# Patient Record
Sex: FEMALE | Race: WHITE | Employment: OTHER | URBAN - NONMETROPOLITAN AREA
[De-identification: names, ages, dates, MRNs, and addresses within clinical notes are randomized per-mention and may not be internally consistent; named-entity substitution may affect disease eponyms.]

---

## 2017-12-22 ENCOUNTER — HOSPITAL ENCOUNTER (OUTPATIENT)
Dept: MAMMOGRAPHY | Age: 67
Discharge: HOME | End: 2017-12-22
Payer: MEDICARE

## 2017-12-22 DIAGNOSIS — Z12.9 SCREENING FOR CANCER: ICD-10-CM

## 2017-12-22 PROCEDURE — G0202 SCR MAMMO BI INCL CAD: HCPCS

## 2018-12-21 ENCOUNTER — HOSPITAL ENCOUNTER (OUTPATIENT)
Dept: CT IMAGING | Age: 68
Discharge: HOME | End: 2018-12-21
Payer: MEDICARE

## 2018-12-21 DIAGNOSIS — Z13.6 ENCOUNTER FOR SCREENING FOR CARDIOVASCULAR DISORDERS: ICD-10-CM

## 2018-12-21 DIAGNOSIS — I10 ESSENTIAL HYPERTENSION: ICD-10-CM

## 2018-12-21 PROCEDURE — 75571 CT HRT W/O DYE W/CA TEST: CPT

## 2018-12-28 ENCOUNTER — HOSPITAL ENCOUNTER (OUTPATIENT)
Dept: MAMMOGRAPHY | Age: 68
Discharge: HOME | End: 2018-12-28
Payer: MEDICARE

## 2018-12-28 DIAGNOSIS — Z12.39 SCREENING BREAST EXAMINATION: ICD-10-CM

## 2018-12-28 PROCEDURE — 77067 SCR MAMMO BI INCL CAD: CPT

## 2020-01-03 ENCOUNTER — HOSPITAL ENCOUNTER (OUTPATIENT)
Dept: MAMMOGRAPHY | Age: 70
Discharge: HOME | End: 2020-01-03
Payer: MEDICARE

## 2020-01-03 PROCEDURE — 77063 BREAST TOMOSYNTHESIS BI: CPT

## 2020-08-20 ENCOUNTER — HOSPITAL ENCOUNTER (INPATIENT)
Age: 70
LOS: 4 days | Discharge: HOME | DRG: 854 | End: 2020-08-24
Attending: INTERNAL MEDICINE | Admitting: INTERNAL MEDICINE
Payer: MEDICARE

## 2020-08-20 PROBLEM — N17.9 ARF (ACUTE RENAL FAILURE) (HCC): Status: ACTIVE | Noted: 2020-08-20

## 2020-08-20 PROCEDURE — 1200000003 HC TELEMETRY R&B

## 2020-08-20 RX ORDER — SODIUM CHLORIDE 0.9 % (FLUSH) 0.9 %
10 SYRINGE (ML) INJECTION PRN
Status: DISCONTINUED | OUTPATIENT
Start: 2020-08-20 | End: 2020-08-24 | Stop reason: HOSPADM

## 2020-08-20 RX ORDER — CITALOPRAM 10 MG/1
10 TABLET ORAL NIGHTLY
COMMUNITY
End: 2021-10-28

## 2020-08-20 RX ORDER — LISINOPRIL 10 MG/1
10 TABLET ORAL DAILY
COMMUNITY
End: 2021-05-12

## 2020-08-20 RX ORDER — SODIUM CHLORIDE 9 MG/ML
INJECTION, SOLUTION INTRAVENOUS CONTINUOUS
Status: DISCONTINUED | OUTPATIENT
Start: 2020-08-21 | End: 2020-08-21

## 2020-08-20 RX ORDER — TAMSULOSIN HYDROCHLORIDE 0.4 MG/1
0.4 CAPSULE ORAL DAILY
Status: DISCONTINUED | OUTPATIENT
Start: 2020-08-21 | End: 2020-08-24 | Stop reason: HOSPADM

## 2020-08-20 RX ORDER — PROMETHAZINE HYDROCHLORIDE 25 MG/1
12.5 TABLET ORAL EVERY 6 HOURS PRN
Status: DISCONTINUED | OUTPATIENT
Start: 2020-08-20 | End: 2020-08-24 | Stop reason: HOSPADM

## 2020-08-20 RX ORDER — ACETAMINOPHEN 325 MG/1
650 TABLET ORAL EVERY 4 HOURS PRN
Status: DISCONTINUED | OUTPATIENT
Start: 2020-08-20 | End: 2020-08-24 | Stop reason: HOSPADM

## 2020-08-20 RX ORDER — SODIUM CHLORIDE 0.9 % (FLUSH) 0.9 %
10 SYRINGE (ML) INJECTION EVERY 12 HOURS SCHEDULED
Status: DISCONTINUED | OUTPATIENT
Start: 2020-08-21 | End: 2020-08-24 | Stop reason: HOSPADM

## 2020-08-20 RX ORDER — OXYCODONE HYDROCHLORIDE AND ACETAMINOPHEN 5; 325 MG/1; MG/1
1 TABLET ORAL EVERY 6 HOURS PRN
Status: DISCONTINUED | OUTPATIENT
Start: 2020-08-20 | End: 2020-08-24 | Stop reason: HOSPADM

## 2020-08-20 RX ORDER — POLYETHYLENE GLYCOL 3350 17 G/17G
17 POWDER, FOR SOLUTION ORAL DAILY PRN
Status: DISCONTINUED | OUTPATIENT
Start: 2020-08-20 | End: 2020-08-24 | Stop reason: HOSPADM

## 2020-08-20 RX ORDER — ONDANSETRON 2 MG/ML
4 INJECTION INTRAMUSCULAR; INTRAVENOUS EVERY 6 HOURS PRN
Status: DISCONTINUED | OUTPATIENT
Start: 2020-08-20 | End: 2020-08-24 | Stop reason: HOSPADM

## 2020-08-20 ASSESSMENT — PAIN - FUNCTIONAL ASSESSMENT: PAIN_FUNCTIONAL_ASSESSMENT: ACTIVITIES ARE NOT PREVENTED

## 2020-08-20 ASSESSMENT — PAIN DESCRIPTION - DESCRIPTORS: DESCRIPTORS: ACHING

## 2020-08-20 ASSESSMENT — PAIN DESCRIPTION - PAIN TYPE: TYPE: ACUTE PAIN

## 2020-08-20 ASSESSMENT — PAIN SCALES - GENERAL: PAINLEVEL_OUTOF10: 7

## 2020-08-20 ASSESSMENT — PAIN DESCRIPTION - LOCATION: LOCATION: FLANK

## 2020-08-20 ASSESSMENT — PAIN DESCRIPTION - FREQUENCY: FREQUENCY: CONTINUOUS

## 2020-08-20 ASSESSMENT — PAIN DESCRIPTION - ONSET: ONSET: ON-GOING

## 2020-08-20 ASSESSMENT — PAIN DESCRIPTION - ORIENTATION: ORIENTATION: RIGHT

## 2020-08-20 NOTE — PROGRESS NOTES
Transfer from [address] Marly Morgan renal failure kidney stone right flank pain Hx stones 1.2 cm ob stone CR 4.5  other labs fine urine small amt leukocytes 3=bacteria Rocephin 2 L fluids Hx /60 has been 150's 82 16 98% 96.7   IP tele  Dr Ldiia Monaco admitting

## 2020-08-21 ENCOUNTER — ANESTHESIA EVENT (OUTPATIENT)
Dept: OPERATING ROOM | Age: 70
DRG: 854 | End: 2020-08-21
Payer: MEDICARE

## 2020-08-21 ENCOUNTER — ANESTHESIA (OUTPATIENT)
Dept: OPERATING ROOM | Age: 70
DRG: 854 | End: 2020-08-21
Payer: MEDICARE

## 2020-08-21 VITALS
SYSTOLIC BLOOD PRESSURE: 115 MMHG | OXYGEN SATURATION: 100 % | RESPIRATION RATE: 13 BRPM | DIASTOLIC BLOOD PRESSURE: 76 MMHG

## 2020-08-21 LAB
ALBUMIN SERPL-MCNC: 2.1 G/DL (ref 3.5–5.1)
ALP BLD-CCNC: 98 U/L (ref 38–126)
ALT SERPL-CCNC: 60 U/L (ref 11–66)
ANION GAP SERPL CALCULATED.3IONS-SCNC: 12 MEQ/L (ref 8–16)
APTT: 22.8 SECONDS (ref 22–38)
AST SERPL-CCNC: 35 U/L (ref 5–40)
BACTERIA: ABNORMAL /HPF
BILIRUB SERPL-MCNC: 0.2 MG/DL (ref 0.3–1.2)
BILIRUBIN URINE: NEGATIVE
BLOOD, URINE: ABNORMAL
BUN BLDV-MCNC: 105 MG/DL (ref 7–22)
CALCIUM SERPL-MCNC: 9.3 MG/DL (ref 8.5–10.5)
CASTS 2: ABNORMAL /LPF
CASTS UA: ABNORMAL /LPF
CHARACTER, URINE: ABNORMAL
CHLORIDE BLD-SCNC: 104 MEQ/L (ref 98–111)
CO2: 17 MEQ/L (ref 23–33)
COLOR: YELLOW
CREAT SERPL-MCNC: 3.7 MG/DL (ref 0.4–1.2)
CRYSTALS, UA: ABNORMAL
EKG ATRIAL RATE: 96 BPM
EKG P AXIS: 49 DEGREES
EKG P-R INTERVAL: 158 MS
EKG Q-T INTERVAL: 394 MS
EKG QRS DURATION: 78 MS
EKG QTC CALCULATION (BAZETT): 497 MS
EKG R AXIS: 51 DEGREES
EKG T AXIS: 71 DEGREES
EKG VENTRICULAR RATE: 96 BPM
EPITHELIAL CELLS, UA: ABNORMAL /HPF
ERYTHROCYTE [DISTWIDTH] IN BLOOD BY AUTOMATED COUNT: 14.4 % (ref 11.5–14.5)
ERYTHROCYTE [DISTWIDTH] IN BLOOD BY AUTOMATED COUNT: 46.9 FL (ref 35–45)
GFR SERPL CREATININE-BSD FRML MDRD: 12 ML/MIN/1.73M2
GLUCOSE BLD-MCNC: 159 MG/DL (ref 70–108)
GLUCOSE URINE: NEGATIVE MG/DL
HCT VFR BLD CALC: 33.2 % (ref 37–47)
HEMOGLOBIN: 11 GM/DL (ref 12–16)
INR BLD: 1.1 (ref 0.85–1.13)
KETONES, URINE: NEGATIVE
LACTIC ACID: 0.8 MMOL/L (ref 0.5–2.2)
LEUKOCYTE ESTERASE, URINE: ABNORMAL
MCH RBC QN AUTO: 29.7 PG (ref 26–33)
MCHC RBC AUTO-ENTMCNC: 33.1 GM/DL (ref 32.2–35.5)
MCV RBC AUTO: 89.7 FL (ref 81–99)
MISCELLANEOUS 2: ABNORMAL
NITRITE, URINE: NEGATIVE
PH UA: 5 (ref 5–9)
PLATELET # BLD: 131 THOU/MM3 (ref 130–400)
PMV BLD AUTO: 11.1 FL (ref 9.4–12.4)
POTASSIUM SERPL-SCNC: 3.8 MEQ/L (ref 3.5–5.2)
PROCALCITONIN: 7.93 NG/ML (ref 0.01–0.09)
PROTEIN UA: 30
RBC # BLD: 3.7 MILL/MM3 (ref 4.2–5.4)
RBC URINE: ABNORMAL /HPF
RENAL EPITHELIAL, UA: ABNORMAL
SARS-COV-2, PCR: NOT DETECTED
SODIUM BLD-SCNC: 133 MEQ/L (ref 135–145)
SPECIFIC GRAVITY, URINE: 1.01 (ref 1–1.03)
TOTAL PROTEIN: 5.5 G/DL (ref 6.1–8)
UROBILINOGEN, URINE: 0.2 EU/DL (ref 0–1)
WBC # BLD: 11.7 THOU/MM3 (ref 4.8–10.8)
WBC UA: > 100 /HPF
YEAST: ABNORMAL

## 2020-08-21 PROCEDURE — 36415 COLL VENOUS BLD VENIPUNCTURE: CPT

## 2020-08-21 PROCEDURE — 2580000003 HC RX 258: Performed by: INTERNAL MEDICINE

## 2020-08-21 PROCEDURE — 6370000000 HC RX 637 (ALT 250 FOR IP): Performed by: STUDENT IN AN ORGANIZED HEALTH CARE EDUCATION/TRAINING PROGRAM

## 2020-08-21 PROCEDURE — 52332 CYSTOSCOPY AND TREATMENT: CPT | Performed by: UROLOGY

## 2020-08-21 PROCEDURE — 3600000003 HC SURGERY LEVEL 3 BASE: Performed by: UROLOGY

## 2020-08-21 PROCEDURE — 3700000000 HC ANESTHESIA ATTENDED CARE: Performed by: UROLOGY

## 2020-08-21 PROCEDURE — 7100000000 HC PACU RECOVERY - FIRST 15 MIN: Performed by: UROLOGY

## 2020-08-21 PROCEDURE — 93005 ELECTROCARDIOGRAM TRACING: CPT | Performed by: ANESTHESIOLOGY

## 2020-08-21 PROCEDURE — 7100000001 HC PACU RECOVERY - ADDTL 15 MIN: Performed by: UROLOGY

## 2020-08-21 PROCEDURE — 87077 CULTURE AEROBIC IDENTIFY: CPT

## 2020-08-21 PROCEDURE — 6360000002 HC RX W HCPCS: Performed by: STUDENT IN AN ORGANIZED HEALTH CARE EDUCATION/TRAINING PROGRAM

## 2020-08-21 PROCEDURE — 3700000001 HC ADD 15 MINUTES (ANESTHESIA): Performed by: UROLOGY

## 2020-08-21 PROCEDURE — 99221 1ST HOSP IP/OBS SF/LOW 40: CPT | Performed by: NURSE PRACTITIONER

## 2020-08-21 PROCEDURE — 6370000000 HC RX 637 (ALT 250 FOR IP): Performed by: NURSE PRACTITIONER

## 2020-08-21 PROCEDURE — 3600000013 HC SURGERY LEVEL 3 ADDTL 15MIN: Performed by: UROLOGY

## 2020-08-21 PROCEDURE — 2580000003 HC RX 258: Performed by: UROLOGY

## 2020-08-21 PROCEDURE — 6360000002 HC RX W HCPCS: Performed by: NURSE ANESTHETIST, CERTIFIED REGISTERED

## 2020-08-21 PROCEDURE — 87040 BLOOD CULTURE FOR BACTERIA: CPT

## 2020-08-21 PROCEDURE — 2580000003 HC RX 258: Performed by: ANESTHESIOLOGY

## 2020-08-21 PROCEDURE — 2709999900 HC NON-CHARGEABLE SUPPLY: Performed by: UROLOGY

## 2020-08-21 PROCEDURE — U0002 COVID-19 LAB TEST NON-CDC: HCPCS

## 2020-08-21 PROCEDURE — 0T768DZ DILATION OF RIGHT URETER WITH INTRALUMINAL DEVICE, VIA NATURAL OR ARTIFICIAL OPENING ENDOSCOPIC: ICD-10-PCS | Performed by: UROLOGY

## 2020-08-21 PROCEDURE — C1769 GUIDE WIRE: HCPCS | Performed by: UROLOGY

## 2020-08-21 PROCEDURE — 99223 1ST HOSP IP/OBS HIGH 75: CPT | Performed by: FAMILY MEDICINE

## 2020-08-21 PROCEDURE — 81001 URINALYSIS AUTO W/SCOPE: CPT

## 2020-08-21 PROCEDURE — 87186 SC STD MICRODIL/AGAR DIL: CPT

## 2020-08-21 PROCEDURE — 2580000003 HC RX 258: Performed by: NURSE PRACTITIONER

## 2020-08-21 PROCEDURE — 87086 URINE CULTURE/COLONY COUNT: CPT

## 2020-08-21 PROCEDURE — 85730 THROMBOPLASTIN TIME PARTIAL: CPT

## 2020-08-21 PROCEDURE — 85027 COMPLETE CBC AUTOMATED: CPT

## 2020-08-21 PROCEDURE — C2617 STENT, NON-COR, TEM W/O DEL: HCPCS | Performed by: UROLOGY

## 2020-08-21 PROCEDURE — 2140000000 HC CCU INTERMEDIATE R&B

## 2020-08-21 PROCEDURE — 2580000003 HC RX 258: Performed by: STUDENT IN AN ORGANIZED HEALTH CARE EDUCATION/TRAINING PROGRAM

## 2020-08-21 PROCEDURE — 6360000002 HC RX W HCPCS: Performed by: UROLOGY

## 2020-08-21 PROCEDURE — 80053 COMPREHEN METABOLIC PANEL: CPT

## 2020-08-21 PROCEDURE — 6370000000 HC RX 637 (ALT 250 FOR IP): Performed by: UROLOGY

## 2020-08-21 PROCEDURE — 83605 ASSAY OF LACTIC ACID: CPT

## 2020-08-21 PROCEDURE — 2500000003 HC RX 250 WO HCPCS: Performed by: NURSE ANESTHETIST, CERTIFIED REGISTERED

## 2020-08-21 PROCEDURE — 84145 PROCALCITONIN (PCT): CPT

## 2020-08-21 PROCEDURE — 85610 PROTHROMBIN TIME: CPT

## 2020-08-21 DEVICE — VARIABLE LENGTH URETERAL STENT
Type: IMPLANTABLE DEVICE | Site: URETER | Status: FUNCTIONAL
Brand: CONTOUR VL™

## 2020-08-21 RX ORDER — MIDAZOLAM HYDROCHLORIDE 1 MG/ML
INJECTION INTRAMUSCULAR; INTRAVENOUS PRN
Status: DISCONTINUED | OUTPATIENT
Start: 2020-08-21 | End: 2020-08-21 | Stop reason: SDUPTHER

## 2020-08-21 RX ORDER — FENTANYL CITRATE 50 UG/ML
50 INJECTION, SOLUTION INTRAMUSCULAR; INTRAVENOUS EVERY 5 MIN PRN
Status: DISCONTINUED | OUTPATIENT
Start: 2020-08-21 | End: 2020-08-21 | Stop reason: HOSPADM

## 2020-08-21 RX ORDER — M-VIT,TX,IRON,MINS/CALC/FOLIC 27MG-0.4MG
1 TABLET ORAL DAILY
Status: DISCONTINUED | OUTPATIENT
Start: 2020-08-21 | End: 2020-08-24 | Stop reason: HOSPADM

## 2020-08-21 RX ORDER — CITALOPRAM 20 MG/1
10 TABLET ORAL NIGHTLY
Status: DISCONTINUED | OUTPATIENT
Start: 2020-08-21 | End: 2020-08-24 | Stop reason: HOSPADM

## 2020-08-21 RX ORDER — SODIUM CHLORIDE, SODIUM LACTATE, POTASSIUM CHLORIDE, AND CALCIUM CHLORIDE .6; .31; .03; .02 G/100ML; G/100ML; G/100ML; G/100ML
1000 INJECTION, SOLUTION INTRAVENOUS ONCE
Status: COMPLETED | OUTPATIENT
Start: 2020-08-21 | End: 2020-08-21

## 2020-08-21 RX ORDER — LABETALOL 20 MG/4 ML (5 MG/ML) INTRAVENOUS SYRINGE
10 EVERY 10 MIN PRN
Status: DISCONTINUED | OUTPATIENT
Start: 2020-08-21 | End: 2020-08-21 | Stop reason: HOSPADM

## 2020-08-21 RX ORDER — CHROMIUM 200 MCG
1 TABLET ORAL DAILY
Status: DISCONTINUED | OUTPATIENT
Start: 2020-08-21 | End: 2020-08-21 | Stop reason: RX

## 2020-08-21 RX ORDER — 0.9 % SODIUM CHLORIDE 0.9 %
500 INTRAVENOUS SOLUTION INTRAVENOUS ONCE
Status: COMPLETED | OUTPATIENT
Start: 2020-08-21 | End: 2020-08-21

## 2020-08-21 RX ORDER — ONDANSETRON 2 MG/ML
INJECTION INTRAMUSCULAR; INTRAVENOUS PRN
Status: DISCONTINUED | OUTPATIENT
Start: 2020-08-21 | End: 2020-08-21 | Stop reason: SDUPTHER

## 2020-08-21 RX ORDER — LACTOBACILLUS RHAMNOSUS GG 10B CELL
1 CAPSULE ORAL DAILY
Status: DISCONTINUED | OUTPATIENT
Start: 2020-08-21 | End: 2020-08-24 | Stop reason: HOSPADM

## 2020-08-21 RX ORDER — EPHEDRINE SULFATE/0.9% NACL/PF 50 MG/5 ML
SYRINGE (ML) INTRAVENOUS PRN
Status: DISCONTINUED | OUTPATIENT
Start: 2020-08-21 | End: 2020-08-21 | Stop reason: SDUPTHER

## 2020-08-21 RX ORDER — DEXAMETHASONE SODIUM PHOSPHATE 4 MG/ML
INJECTION, SOLUTION INTRA-ARTICULAR; INTRALESIONAL; INTRAMUSCULAR; INTRAVENOUS; SOFT TISSUE PRN
Status: DISCONTINUED | OUTPATIENT
Start: 2020-08-21 | End: 2020-08-21 | Stop reason: SDUPTHER

## 2020-08-21 RX ORDER — DOCUSATE SODIUM 100 MG/1
100 CAPSULE, LIQUID FILLED ORAL DAILY PRN
Status: DISCONTINUED | OUTPATIENT
Start: 2020-08-21 | End: 2020-08-24 | Stop reason: HOSPADM

## 2020-08-21 RX ORDER — FENTANYL CITRATE 50 UG/ML
25 INJECTION, SOLUTION INTRAMUSCULAR; INTRAVENOUS EVERY 5 MIN PRN
Status: DISCONTINUED | OUTPATIENT
Start: 2020-08-21 | End: 2020-08-21 | Stop reason: HOSPADM

## 2020-08-21 RX ORDER — ACETAMINOPHEN 325 MG/1
325 TABLET ORAL ONCE
Status: COMPLETED | OUTPATIENT
Start: 2020-08-21 | End: 2020-08-21

## 2020-08-21 RX ORDER — PROMETHAZINE HYDROCHLORIDE 25 MG/ML
12.5 INJECTION, SOLUTION INTRAMUSCULAR; INTRAVENOUS
Status: DISCONTINUED | OUTPATIENT
Start: 2020-08-21 | End: 2020-08-21 | Stop reason: HOSPADM

## 2020-08-21 RX ORDER — SUCCINYLCHOLINE CHLORIDE 20 MG/ML
INJECTION INTRAMUSCULAR; INTRAVENOUS PRN
Status: DISCONTINUED | OUTPATIENT
Start: 2020-08-21 | End: 2020-08-21 | Stop reason: SDUPTHER

## 2020-08-21 RX ORDER — PROPOFOL 10 MG/ML
INJECTION, EMULSION INTRAVENOUS PRN
Status: DISCONTINUED | OUTPATIENT
Start: 2020-08-21 | End: 2020-08-21 | Stop reason: SDUPTHER

## 2020-08-21 RX ORDER — FENTANYL CITRATE 50 UG/ML
INJECTION, SOLUTION INTRAMUSCULAR; INTRAVENOUS PRN
Status: DISCONTINUED | OUTPATIENT
Start: 2020-08-21 | End: 2020-08-21 | Stop reason: SDUPTHER

## 2020-08-21 RX ORDER — LIDOCAINE HCL/PF 100 MG/5ML
SYRINGE (ML) INJECTION PRN
Status: DISCONTINUED | OUTPATIENT
Start: 2020-08-21 | End: 2020-08-21 | Stop reason: SDUPTHER

## 2020-08-21 RX ORDER — FENOFIBRATE 160 MG/1
160 TABLET ORAL DAILY
Status: DISCONTINUED | OUTPATIENT
Start: 2020-08-21 | End: 2020-08-24 | Stop reason: HOSPADM

## 2020-08-21 RX ORDER — SODIUM CHLORIDE 9 MG/ML
INJECTION, SOLUTION INTRAVENOUS CONTINUOUS
Status: DISCONTINUED | OUTPATIENT
Start: 2020-08-21 | End: 2020-08-24

## 2020-08-21 RX ORDER — LANOLIN ALCOHOL/MO/W.PET/CERES
4.5 CREAM (GRAM) TOPICAL NIGHTLY
Status: DISCONTINUED | OUTPATIENT
Start: 2020-08-21 | End: 2020-08-24 | Stop reason: HOSPADM

## 2020-08-21 RX ADMIN — Medication 4.5 MG: at 21:31

## 2020-08-21 RX ADMIN — ONDANSETRON 4 MG: 2 INJECTION INTRAMUSCULAR; INTRAVENOUS at 09:20

## 2020-08-21 RX ADMIN — Medication 10 MG: at 13:44

## 2020-08-21 RX ADMIN — SODIUM CHLORIDE 500 ML: 9 INJECTION, SOLUTION INTRAVENOUS at 15:00

## 2020-08-21 RX ADMIN — PIPERACILLIN AND TAZOBACTAM 3.38 G: 3; .375 INJECTION, POWDER, LYOPHILIZED, FOR SOLUTION INTRAVENOUS at 20:30

## 2020-08-21 RX ADMIN — PROPOFOL 100 MG: 10 INJECTION, EMULSION INTRAVENOUS at 13:32

## 2020-08-21 RX ADMIN — PHENYLEPHRINE HYDROCHLORIDE 100 MCG: 10 INJECTION INTRAVENOUS at 13:38

## 2020-08-21 RX ADMIN — SODIUM CHLORIDE 500 ML: 9 INJECTION, SOLUTION INTRAVENOUS at 09:51

## 2020-08-21 RX ADMIN — SODIUM CHLORIDE 500 ML: 9 INJECTION, SOLUTION INTRAVENOUS at 15:30

## 2020-08-21 RX ADMIN — ACETAMINOPHEN 650 MG: 325 TABLET ORAL at 00:30

## 2020-08-21 RX ADMIN — DEXAMETHASONE SODIUM PHOSPHATE 8 MG: 4 INJECTION, SOLUTION INTRAMUSCULAR; INTRAVENOUS at 13:40

## 2020-08-21 RX ADMIN — PHENYLEPHRINE HYDROCHLORIDE 200 MCG: 10 INJECTION INTRAVENOUS at 13:44

## 2020-08-21 RX ADMIN — Medication 100 MG: at 13:32

## 2020-08-21 RX ADMIN — SODIUM CHLORIDE: 9 INJECTION, SOLUTION INTRAVENOUS at 00:26

## 2020-08-21 RX ADMIN — Medication 10 ML: at 20:30

## 2020-08-21 RX ADMIN — FENTANYL CITRATE 50 MCG: 50 INJECTION, SOLUTION INTRAMUSCULAR; INTRAVENOUS at 13:26

## 2020-08-21 RX ADMIN — PHENYLEPHRINE HYDROCHLORIDE 200 MCG: 10 INJECTION INTRAVENOUS at 13:42

## 2020-08-21 RX ADMIN — PIPERACILLIN AND TAZOBACTAM 3.38 G: 3; .375 INJECTION, POWDER, LYOPHILIZED, FOR SOLUTION INTRAVENOUS at 09:50

## 2020-08-21 RX ADMIN — ACETAMINOPHEN 325 MG: 325 TABLET ORAL at 09:55

## 2020-08-21 RX ADMIN — ACETAMINOPHEN 650 MG: 325 TABLET ORAL at 07:58

## 2020-08-21 RX ADMIN — PHENYLEPHRINE HYDROCHLORIDE 100 MCG: 10 INJECTION INTRAVENOUS at 13:32

## 2020-08-21 RX ADMIN — CITALOPRAM 10 MG: 20 TABLET, FILM COATED ORAL at 21:31

## 2020-08-21 RX ADMIN — SODIUM CHLORIDE, POTASSIUM CHLORIDE, SODIUM LACTATE AND CALCIUM CHLORIDE 1000 ML: 600; 310; 30; 20 INJECTION, SOLUTION INTRAVENOUS at 16:00

## 2020-08-21 RX ADMIN — SUCCINYLCHOLINE CHLORIDE 100 MG: 20 INJECTION, SOLUTION INTRAMUSCULAR; INTRAVENOUS at 13:32

## 2020-08-21 RX ADMIN — ONDANSETRON HYDROCHLORIDE 4 MG: 4 INJECTION, SOLUTION INTRAMUSCULAR; INTRAVENOUS at 13:40

## 2020-08-21 RX ADMIN — MIDAZOLAM HYDROCHLORIDE 1 MG: 1 INJECTION, SOLUTION INTRAMUSCULAR; INTRAVENOUS at 13:24

## 2020-08-21 ASSESSMENT — PULMONARY FUNCTION TESTS
PIF_VALUE: 1
PIF_VALUE: 2
PIF_VALUE: 16
PIF_VALUE: 0
PIF_VALUE: 1
PIF_VALUE: 0
PIF_VALUE: 0
PIF_VALUE: 18
PIF_VALUE: 15
PIF_VALUE: 0
PIF_VALUE: 15
PIF_VALUE: 18
PIF_VALUE: 16
PIF_VALUE: 18
PIF_VALUE: 16
PIF_VALUE: 3
PIF_VALUE: 3
PIF_VALUE: 17
PIF_VALUE: 18
PIF_VALUE: 15
PIF_VALUE: 15
PIF_VALUE: 0
PIF_VALUE: 18
PIF_VALUE: 17
PIF_VALUE: 16
PIF_VALUE: 6

## 2020-08-21 ASSESSMENT — PAIN SCALES - GENERAL
PAINLEVEL_OUTOF10: 0
PAINLEVEL_OUTOF10: 0
PAINLEVEL_OUTOF10: 7
PAINLEVEL_OUTOF10: 0
PAINLEVEL_OUTOF10: 0
PAINLEVEL_OUTOF10: 4
PAINLEVEL_OUTOF10: 4
PAINLEVEL_OUTOF10: 0
PAINLEVEL_OUTOF10: 6
PAINLEVEL_OUTOF10: 4

## 2020-08-21 ASSESSMENT — PAIN DESCRIPTION - PAIN TYPE
TYPE: ACUTE PAIN
TYPE: ACUTE PAIN

## 2020-08-21 ASSESSMENT — PAIN DESCRIPTION - LOCATION
LOCATION: FLANK
LOCATION: FLANK

## 2020-08-21 ASSESSMENT — PAIN DESCRIPTION - ORIENTATION: ORIENTATION: RIGHT

## 2020-08-21 NOTE — ANESTHESIA PRE PROCEDURE
Department of Anesthesiology  Preprocedure Note       Name:  Alissa Weinberg   Age:  68 y.o.  :  1950                                          MRN:  [id number]         Date:  2020      Surgeon: Sameera Ku):  Lisa Cheng MD    Procedure: Procedure(s):  CYSTOSCOPY, RIGHT  URETERAL STENT INSERTION    Medications prior to admission:   Prior to Admission medications    Medication Sig Start Date End Date Taking? Authorizing Provider   citalopram (CELEXA) 10 MG tablet Take 10 mg by mouth nightly   Yes Historical Provider, MD   lisinopril (PRINIVIL;ZESTRIL) 10 MG tablet Take 10 mg by mouth daily   Yes Historical Provider, MD   melatonin 5 MG TABS tablet Take 5 mg by mouth nightly   Yes Historical Provider, MD   docusate sodium (COLACE) 100 MG capsule Take 1 capsule by mouth daily as needed for Constipation 16   Reinaldo Peralta MD   Phosphatidylserine 100 MG CAPS Take 1.5 capsules by mouth daily    Historical Provider, MD   Chromium 200 MCG TABS Take 1 tablet by mouth daily    Historical Provider, MD   UNABLE TO FIND Take 2 tablets by mouth nightly L-Tryptophan    Historical Provider, MD   UNABLE TO FIND daily ligaplex    Historical Provider, MD   UNABLE TO FIND 1 tablet 2 times daily Alpha lippin    Historical Provider, MD   UNABLE TO FIND 1 tablet daily paratid    Historical Provider, MD   Nutritional Supplements (BOOST PO) Take by mouth 2 times daily    Historical ProviderMD [facility] [address] daily Amino sculpt    Historical Provider, MD   UNABLE TO FIND daily cholacol  II    Historical Provider, MD   UNABLE TO FIND 2 tablets daily ANTRONEX    Historical Provider, MD   UNABLE TO FIND 1 tablet daily [address]    Historical Provider, MD   Alosetron HCl (LOTRONEX PO) Take by mouth as needed    Historical Provider, MD   fenofibrate (TRICOR) 145 MG tablet Take 145 mg by mouth daily. Historical Provider, MD   Potassium 99 MG TABS Take  by mouth daily.     Historical Provider, MD   Probiotic Product injection 40 mg  40 mg Subcutaneous Daily Chastity Guerrero DO        oxyCODONE-acetaminophen (PERCOCET) 5-325 MG per tablet 1 tablet  1 tablet Oral Q6H PRN Chastity Guerrero DO        tamsulosin (FLOMAX) capsule 0.4 mg  0.4 mg Oral Daily Chastity Guerrero DO           Allergies: Allergies   Allergen Reactions    Asa [Aspirin] Other (See Comments)     shaky       Problem List:    Patient Active Problem List   Diagnosis Code    Kidney stone N20.0    ARF (acute renal failure) ([facility] N17.9       Past Medical History:        Diagnosis Date    Anxiety     Breast cancer ([facility]     Fibromyalgia     Hyperlipidemia     Hypertension     IBS (irritable bowel syndrome)     Kidney stones     Nausea & vomiting     Prolonged emergence from general anesthesia        Past Surgical History:        Procedure Laterality Date    [facility]  2007    CYSTOSCOPY  16    Right Ureteroscopy Laser Lithotripsy Basket Retrieval of Stone Fragments Right Ureteral Stent Placement, Left Ureteroscopy Basket Retrieval of Stone Left Ureteral Stent Placement - Dr. Parra [facility] CYSTOURETHROSCOPY Left 2013    HOLMIUM LASER LITHOTRIPSY, BASKET EXTRACTION  STENT INSERTION     DILATION AND CURETTAGE OF UTERUS      x3    HYSTERECTOMY      LITHOTRIPSY      OTHER SURGICAL HISTORY      samuel?  URETEROSCOPY         Social History:    Social History     Tobacco Use    Smoking status: Former Smoker     Packs/day: 0.50     Years: 6.00     Pack years: 3.00     Last attempt to quit: 1973     Years since quittin.1    Smokeless tobacco: Never Used   Substance Use Topics    Alcohol use: Yes     Comment: OCC.                                 Counseling given: Not Answered      Vital Signs (Current):   Vitals:    20 0310 20 0810 20 0913 20 1126   BP: (!) 145/70 (!) 189/83 (!) 151/63 (!) 97/57   Pulse: 67 64 121 106   Resp: 17 16 22 15 Temp: 97.9 °F (36.6 °C) 99.3 °F (37.4 °C) 102.1 °F (38.9 °C) 99.8 °F (37.7 °C)   TempSrc: Oral Oral Oral Oral   SpO2: 96% 96% 96% 93%   Weight:       Height:                                                  BP Readings from Last 3 Encounters:   08/21/20 (!) 97/57   04/27/16 160/70   04/07/15 110/68       NPO Status:                                                                                 BMI:   Wt Readings from Last 3 Encounters:   08/20/20 125 lb 6.4 oz (56.9 kg)   04/27/16 123 lb 7.3 oz (56 kg)   04/21/16 121 lb (54.9 kg)     Body mass index is 22.94 kg/m². CBC:   Lab Results   Component Value Date    WBC 11.7 08/21/2020    RBC 3.70 08/21/2020    RBC 3.90 04/15/2016    HGB 11.0 08/21/2020    HCT 33.2 08/21/2020    MCV 89.7 08/21/2020    RDW 13.9 04/16/2016     08/21/2020       CMP:   Lab Results   Component Value Date     08/21/2020    K 3.8 08/21/2020     08/21/2020    CO2 17 08/21/2020     08/21/2020    CREATININE 3.7 08/21/2020    CREATININE 1.0 11/20/2013    LABGLOM 12 08/21/2020    GLUCOSE 159 08/21/2020    GLUCOSE 89 04/15/2016    PROT 5.5 08/21/2020    CALCIUM 9.3 08/21/2020    BILITOT 0.2 08/21/2020    ALKPHOS 98 08/21/2020    AST 35 08/21/2020    ALT 60 08/21/2020       POC Tests: No results for input(s): POCGLU, POCNA, POCK, POCCL, POCBUN, POCHEMO, POCHCT in the last 72 hours.     Coags:   Lab Results   Component Value Date    INR 1.10 08/21/2020    APTT 22.8 08/21/2020       HCG (If Applicable): No results found for: PREGTESTUR, PREGSERUM, HCG, HCGQUANT     ABGs: No results found for: PHART, PO2ART, ZKH6WID, BQZ6GAU, BEART, N1KPAZVH     Type & Screen (If Applicable):  No results found for: LABABO, LABRH    Drug/Infectious Status (If Applicable):  No results found for: HIV, HEPCAB    COVID-19 Screening (If Applicable):   Lab Results   Component Value Date    COVID19 NOT DETECTED 08/21/2020         Anesthesia Evaluation  Patient summary reviewed  Airway: Mallampati: II  TM distance: >3 FB   Neck ROM: full  Mouth opening: > = 3 FB Dental:          Pulmonary:                              Cardiovascular:    (+) hypertension:,       ECG reviewed                        Neuro/Psych:   (+) neuromuscular disease:,             GI/Hepatic/Renal:             Endo/Other:                     Abdominal:           Vascular:                                        Anesthesia Plan      general     ASA 2       Induction: intravenous and rapid sequence. MIPS: Postoperative opioids intended and Prophylactic antiemetics administered. Anesthetic plan and risks discussed with patient and spouse. Plan discussed with CRNA. Lauryn Liz.  [address] [place] [address]   8/21/2020

## 2020-08-21 NOTE — PROGRESS NOTES
Patient has temp, heart rate up to 130's. Patient shivering. Agnieszka notified.  Fluid bolus given and 1  Tylenol given in addition to the Tylenol given this am.

## 2020-08-21 NOTE — BRIEF OP NOTE
Brief Postoperative Note      Patient: Swapnil Peters  YOB: 1950  MRN: [id number]    Date of Procedure: 8/21/2020    Pre-Op Diagnosis: RIGHT URETERAL CALCULUS    Post-Op Diagnosis: Same; pyohydronephrosis       Procedure(s):  CYSTOSCOPY, RIGHT  URETERAL STENT INSERTION    Surgeon(s):  Dianne Torrez MD    Assistant:  * No surgical staff found *    Anesthesia: General    Estimated Blood Loss (mL): Minimal    Complications: None    Specimens:   * No specimens in log *    Implants:  Implant Name Type Inv.  Item Serial No.  Lot No. LRB No. Used Action   STENT URET DBL PIGTL MULTI 6FR [id number] Stent:Urological Laveta Beat PIGTL MULTI 6FR [id number]  [employer]: [address] [place] [address] [id number] Right 1 Implanted         Drains: * No LDAs found *    Findings: See op report    Electronically signed by Cathy Orozco MD on 8/21/2020 at 1:53 PM

## 2020-08-21 NOTE — PROGRESS NOTES
Pharmacy Renal Adjustment    Jina Day is a 68 y.o. female. Pharmacy renally adjust the following medications per P&T approved policy: Zosyn    Recent Labs     08/21/20  0045   *       Recent Labs     08/21/20  0045   CREATININE 3.7*       Estimated Creatinine Clearance: 11 mL/min (A) (based on SCr of 3.7 mg/dL [facility])).   Calculated CrCl: 11 ml/min    Height:   Ht Readings from Last 1 Encounters:   08/20/20 5' 2\" (1.575 m)     Weight:  Wt Readings from Last 1 Encounters:   08/20/20 125 lb 6.4 oz (56.9 kg)         Plan: Adjustments based on renal function:           Change Zosyn 3.375 g q8h  to q12h

## 2020-08-21 NOTE — PROGRESS NOTES
Pt admitted to  6K12 from ED. Complaints: abdominal pain. IV normal saline infusing into the antecubital right, condition patent and no redness at a rate of 50 mls/ hour with about 200 mls in the bag still. IV site free of s/s of infection or infiltration. Vital signs obtained. Assessment and data collection initiated. Two nurse skin assessment performed by [facility] RN and Amina Hernandez. Oriented to room. Policies and procedures for 6K explained. Danika KIMBLE discussed hourly rounding with patient addressing 5 P's. Fall prevention and safety brochure discussed with patient. Bed alarm on. Call light in reach. The best day to schedule a follow up Dr appointment is:  Tuesday a.m. Explained patients right to have family, representative or physician notified of their admission. Patient has Declined for physician to be notified. Patient has Declined for family/representative to be notified. All questions answered with no further questions at this time.

## 2020-08-21 NOTE — PROGRESS NOTES
49 Josselyn Toro care of pt , denies any pain or nausea , feels the need to void pt placed on bedpan BP in the 36'K systolic and HR tachy , IV fluidscontinue wide open for a 500 ml fluid challenge  1430 able to void  1440 500 fluid challenge complete   5116 BP 80 systolic , Dr Ruben Page updated orders given pt remains pain free  1500 500 ml fluid challenge # 2 started  1505 update sent to family By Floor nurse Jordon Paeg updated orders given , 500 ml fluid challenge # 3 started , pt remains pain free  1545 Dr Nikia Beasley hospitalist  Called , given update on pt , orders given   1600 0.9 500 ml fluid challenge complete, 1000 ml LR fluid challenge started, update given to [facility] 6K floor nurse to give to pt  , pt resting resp easy   1630 BP coming up slightly  1640 pt c/o dull ache pain under Lt breast Dr Lisa aware orders given   [id number] EKG completed , viewed by Dr Lisa  1700 1000 ml LR fluid challenge complete ,IV back to floor rate [facility] NP given update on pt    1710 report called to 3b , update and room number sent to family via Ata Ganxstranatana[address] 197 floor nurse  [phone] resting on and off denies any needs  1735 pt denies any pain , able to rest on and off resp easy   1740 meets criteria for discharge , transported to  ,  to meet pt in room

## 2020-08-21 NOTE — PROGRESS NOTES
Arrived to pacu at 1355. 3L oxygen applied. Follows simple commands and denies any pain.     1407- no complaints of pain  1414- no complaints of pain, resting comfortably, HR coming down slighty

## 2020-08-21 NOTE — CARE COORDINATION
8/21/20, 7:45 AM EDT  DISCHARGE PLANNING EVALUATION:    Moshe Gonzalez       Admitted from: transfer from [facility] 8/20/2020/ 2143 Hospital day: 1   Location: [facility] Reason for admit: ARF (acute renal failure) ([facility] [address] [N17.9] Status: IP   Admit order signed?: no  PMH:  has a past medical history of Anxiety, [facility] Fibromyalgia, Hyperlipidemia, Hypertension, IBS (irritable bowel syndrome), Kidney stones, Nausea & vomiting, and Prolonged emergence from general anesthesia. Medications:  Scheduled Meds:   citalopram  10 mg Oral Nightly    fenofibrate  160 mg Oral Daily    melatonin  4.5 mg Oral Nightly    therapeutic multivitamin-minerals  1 tablet Oral Daily    lactobacillus  1 capsule Oral Daily    piperacillin-tazobactam  3.375 g Intravenous Q12H    sodium chloride flush  10 mL Intravenous 2 times per day    [Held by provider] enoxaparin  40 mg Subcutaneous Daily    tamsulosin  0.4 mg Oral Daily     Continuous Infusions:   sodium chloride 100 mL/hr at 08/21/20 0026      Pertinent Info/Orders/Treatment Plan:  CO2 17, creat 3.7, PCT 7.93, UA abn. IVF, IV zosyn, pain control. Urology consult for report of right ureteral stone, planning OR later today for stent placement if Covid neg. Diet: Diet NPO, After Midnight   Smoking status:  reports that she quit smoking about 47 years ago. She has a 3.00 pack-year smoking history.  She has never used smokeless tobacco.   PCP: Qamar Mcmahon MD.  Readmission 30 days or less: no  Readmission Risk Score: 11%    Discharge Planning Evaluation  Current Residence:  Private Residence  Living Arrangements:  Spouse/Significant Other   Support Systems:  Spouse/Significant Other  Current Services PTA:     Potential Assistance Needed:  N/A  Potential Assistance Purchasing Medications:  No  Does patient want to participate in local refill/ meds to beds program?  No  Type of Home Care Services:  None  Patient expects to be discharged to:  Home with   Expected Discharge date:  08/25/20  Follow Up Appointment: Best Day/ Time: Monday PM    Patient Goals/Plan/Treatment Preferences: Spoke with Brit, she is from home with her . She has been using a walker to ambulate. At this time she does not anticipate a need for [facility] services or additional DME. Transportation/Food Security/Housekeeping Addressed:  No issues identified.     Evaluation: no

## 2020-08-21 NOTE — PROGRESS NOTES
Pt admitted to  3B27 via cart/stretcher. Complaints: None. IV normal saline infusing into the forearm right, condition patent and no redness at a rate of 100 mls/ hour with about 400 mls in the bag still. IV site free of s/s of infection or infiltration. Vital signs obtained. Assessment and data collection initiated. Two nurse skin assessment performed by [facility] and Jose Carlos Hodges RN. Oriented to room. Policies and procedures for 3B explained. Homer Marmolejo RN discussed hourly rounding with patient addressing 5 P's. Fall prevention and safety brochure discussed with patient. Bed alarm on. Call light in reach. The best day to schedule a follow up Dr appointment is:  Monday p.m. Explained patients right to have family, representative or physician notified of their admission. Patient has Declined for physician to be notified. Patient has Declined for family/representative to be notified. All questions answered with no further questions at this time.

## 2020-08-21 NOTE — H&P
History & Physical        Patient:  Nancy Barone  YOB: 1950    MRN: [id number]     Acct: [de-identified]    PCP: No primary care provider on file. Date of Admission: 8/20/2020    Date of Service: Pt seen/examined on 08/21/20  and Admitted to Inpatient with expected LOS greater than two midnights due to medical therapy. Chief Complaint:  Abdominal and Flank Pain    Assessment and Plan:    1.) Right Sided Obstructive Ureteral Stone, 1.2cm:  History of multiple obstructive stones in the past requiring Lithotripsy. Was seeing Dr. Nicolas Rondon at one point, however he moved to [facility]. Symptoms of flank pain traveling to abdominal pain with nausea and vomiting. 2 non-obstructing stones seen in Left kidney. - Consult Urology   - CT Abdomen/Pelvis ordered   - Keep Patient NPO   - Zosyn ordered ordered for cystoscopy prophylaxis and UTI/Sepsis   - Zofran PRN for nausea   - Tylenol PRN for pain/fever    - If needed, Oxycodone ordered for severe pain   - Flomax 0.4mg added   - IVF hydration   - Stone Collection and Analysis ordered    2.) RAGINI, multifactorial causes:  Cr. 4.5, , GFR 10, BUN:Cr >20, suggesting prerenal causes. Patient has been vomiting and having diarrhea which could contribute to dehydration. There may also be added injury from the obstructive stone and the UTI. Given 2L of water in [place] and started on Rocephin.   - Continue IVF as above   - Continue Rocephin as above   - Check BMP daily for resolution   - Hold Lisinopril   - Hold Nephrotoxic medications    3.) Sepsis Secondary to UTI:  UA showed Bacteria and WBC in the urine, however no nitrites are seen. Patient denied dysuria or blood in her urine. She does state dizziness, poor balance, and feeling mentally foggy, which she has been attributing to the UTI. ProCal 7.93, WBC 11.7.   Receive 2L of fluid at [facility] Zosyn   - Check Lactic Acid   - UA and culture sent   - Blood Cultures draw    4.) Dizziness upon Standing: Karyna mentioned that this seemed like symptoms of vertigo. However patient was not having symptoms until the Kidney stone and UTI started. May be Orthostatic in nature considering the dehydration, or may be due to the UTI itself. - Monitor for resolution of symptoms with treatment   - Check Orthostatics   - If dizziness persists, may continue work-up for vertigo. 4.) HTN:  Has been mildly Hypertensive since admission. May be due to pain for stress from the UTI. Continue home medications. 5.) History of Breast Cancer:  Right Breast localized, in remission, treated with chemo. Requests blood draw off of right side only. State left sided veins are small from the chemo infusion. History Of Present Illness:      Mrs. Henry Meade is a 68year old female with a past medical history of recurrent Kidney stones requiring Lithotripsy to treat, Right Breast Cancer in remission and treated with chemo, HTN, FM, and Anxiety who presented to [facility] ER for Abdominal and Flank Pain lasting 2 days. She states she originally saw her chiropractor about a week ago who tested her urine and saw it had bacteria in it. The patient denied symptoms at the time. She also stated he palpated where her gallbladder was, and was concerned that she wasn't having pain and recommended she go to the ER. She started having flank pain on Saturday and Sunday, and it slowly descended to abdominal pain as the week progressed. She stated the pain is mostly on the right side, but states some mild pain on the left as well. At its worst, she states it is a 10/10. But after being given Tylenol, she states its a 6/10. She states nausea and vomiting present with the pain, but denies fever, chills, or chest pain. She states she has a mild headache, and has been dizzy when standing for the past few days as well. She mentioned diarrhea as a symptom at [facility], but does not admit to it here.   A CT scan was done of her abdomen, and they found an obstructive 1.2cm right ureteral stone. They also noticed 2 non-obstructing stones in the left kidney and a 2cm gallstone in the gallbladder. A UA showed bacteria and WBC in the urine, and a BMP showed a Cr of 4.5 and a BUN of 115. They transferred her to [facility] to be seen by Urology. Here, she states that she feels mentally foggy and believes its due to the UTI. She wonders if the UTI can also cause her recent dizziness, too. She does recall that she was having fevers earlier in the week, but stated they were controlled with Tylenol. She states many stones in the past, but can't remember what they were made out of. She states to only draw blood out of the right arm due to the left arm being used for chemo treatments in the past.    Past Medical History:          Diagnosis Date    Anxiety     Breast cancer ([facility] [address]     Fibromyalgia     Hyperlipidemia     Hypertension     IBS (irritable bowel syndrome)     Kidney stones     Nausea & vomiting     Prolonged emergence from general anesthesia        Past Surgical History:          Procedure Laterality Date    [facility]  2007    CYSTOSCOPY  4/27/16    Right Ureteroscopy Laser Lithotripsy Basket Retrieval of Stone Fragments Right Ureteral Stent Placement, Left Ureteroscopy Basket Retrieval of Stone Left Ureteral Stent Placement - Dr. Madie Luis CYSTOURETHROSCOPY Left 12/02/2013    HOLMIUM LASER LITHOTRIPSY, BASKET EXTRACTION  STENT INSERTION     DILATION AND CURETTAGE OF UTERUS      x3    HYSTERECTOMY  1997    LITHOTRIPSY      OTHER SURGICAL HISTORY      samuel?  URETEROSCOPY         Medications Prior to Admission:      Prior to Admission medications    Medication Sig Start Date End Date Taking?  Authorizing Provider   citalopram (CELEXA) 10 MG tablet Take 10 mg by mouth nightly   Yes Historical Provider, MD   lisinopril (PRINIVIL;ZESTRIL) 10 MG tablet Take 10 mg by mouth daily   Yes Historical Provider, MD   melatonin 5 MG TABS tablet Take 5 mg by mouth nightly   Yes Historical Provider, MD   docusate sodium (COLACE) 100 MG capsule Take 1 capsule by mouth daily as needed for Constipation 4/27/16   Aneudy Kay MD   Phosphatidylserine 100 MG CAPS Take 1.5 capsules by mouth daily    Historical Provider, MD   Chromium 200 MCG TABS Take 1 tablet by mouth daily    Historical Provider, MD   UNABLE TO FIND Take 2 tablets by mouth nightly L-Tryptophan    Historical Provider, MD   UNABLE TO FIND daily ligaplex    Historical Provider, MD   UNABLE TO FIND 1 tablet 2 times daily Alpha lippin    Historical Provider, MD   UNABLE TO FIND 1 tablet daily paratid    Historical Provider, MD   Nutritional Supplements (BOOST PO) Take by mouth 2 times daily    Historical Provider, MD [address] daily Amino sculpt    Historical Provider, MD   UNABLE TO FIND daily cholacol  II    Historical Provider, MD   UNABLE TO FIND 2 tablets daily ANTRONEX    Historical Provider, MD   UNABLE TO FIND 1 tablet daily [address]    Historical Provider, MD   Alosetron HCl (LOTRONEX PO) Take by mouth as needed    Historical Provider, MD   fenofibrate (TRICOR) 145 MG tablet Take 145 mg by mouth daily. Historical Provider, MD   Potassium 99 MG TABS Take  by mouth daily. Historical Provider, MD   Probiotic Product (PROBIOTIC DAILY PO) Take by mouth daily PROBIO 5    Historical Provider, MD   Multiple Vitamins-Minerals (THERAPEUTIC MULTIVITAMIN-MINERALS) tablet Take 1 tablet by mouth daily. Historical Provider, MD   UNABLE TO FIND Take 4 tablets by mouth daily. mannotech ambrotose  For fibromyalgia    Historical Provider, MD   UNABLE TO FIND Take 1 tablet by mouth 2 times daily. lumin b6   [place] 3    Historical Provider, MD       Allergies:  Asa [aspirin]    Social History:      The patient currently lives at home. TOBACCO:   reports that she quit smoking about 47 years ago. She has a 3.00 pack-year smoking history. She has never used smokeless tobacco.  ETOH:   reports current alcohol use. Family History:      Reviewed in detail and negative for DM, CAD, Cancer, CVA. Positive as follows:        Problem Relation Age of Onset    Arthritis Mother     Cancer Mother     Stroke Mother     Cancer Maternal Aunt     Stroke Maternal Grandmother     Cancer Maternal Grandfather     Heart Disease Paternal Grandmother     Heart Disease Father        Diet:  Diet NPO, After Midnight    REVIEW OF SYSTEMS:   Pertinent positives as noted in the HPI. All other systems reviewed and negative. PHYSICAL EXAM:    BP (!) 157/75   Pulse 81   Temp 98.5 °F (36.9 °C) (Oral)   Resp 18   Ht 5' 2\" (1.575 m)   Wt 125 lb 6.4 oz (56.9 kg)   SpO2 98%   BMI 22.94 kg/m²     General appearance:  No apparent distress, appears stated age and cooperative. HEENT:  Normal cephalic, atraumatic without obvious deformity. Pupils equal, round, and reactive to light. Extra ocular muscles intact. Conjunctivae/corneas clear. Neck: Supple, with full range of motion. No jugular venous distention. Trachea midline. Respiratory:  Normal respiratory effort. Clear to auscultation, bilaterally without Rales/Wheezes/Rhonchi. Cardiovascular:  Regular rate and rhythm with normal S1/S2 without murmurs, rubs or gallops. Abdomen: Soft, mild tenderness to palpation over RUQ and right flank, mild CVA tenderness on right, non-distended with normal bowel sounds. Musculoskeletal:  No clubbing, cyanosis or edema bilaterally. Full range of motion without deformity. Skin: Skin color, texture, turgor normal.  No rashes or lesions. Neurologic:  Neurovascularly intact without any focal sensory/motor deficits.  Cranial nerves: II-XII intact, grossly non-focal.  Psychiatric:  Alert and oriented, thought content appropriate, normal insight  Capillary Refill: Brisk,< 3 seconds   Peripheral Pulses: +2 palpable, equal bilaterally Labs:     No results for input(s): WBC, HGB, HCT, PLT in the last 72 hours. No results for input(s): NA, K, CL, CO2, BUN, CREATININE, CALCIUM, PHOS in the last 72 hours. Invalid input(s): MAGNES  No results for input(s): AST, ALT, BILIDIR, BILITOT, ALKPHOS in the last 72 hours. No results for input(s): INR in the last 72 hours. No results for input(s): Nickie Spice in the last 72 hours. Urinalysis:      Lab Results   Component Value Date    BLOODU trace 05/26/2016       Intake & Output:  No intake/output data recorded. No intake/output data recorded. Radiology:     No Radiology to Review. CT print out from [facility] state obstructive 1.2cm stone in right proximal ureter. CT ABDOMEN PELVIS WO CONTRAST Additional Contrast? None (Patient in RAGINI)    (Results Pending)        DVT prophylaxis: Lovenox    Code Status: Full Code    Active Hospital Problems    Diagnosis Date Noted    ARF (acute renal failure) ([facility] [address] [N17.9] 08/20/2020     Thank you No primary care provider on file. for the opportunity to be involved in this patient's care.     Electronically signed by Radha Cordova DO on 8/21/2020 at 12:02 AM

## 2020-08-21 NOTE — CONSULTS
Urology Consult Note      Reason for Consult:  \"obstructing stone, 1.2 cm, seen in [facility]\"  Requesting Physician:  Dr. Ashia Leung    History Obtained From:  patient, electronic medical record    Chief Complaint: R flank pain, n/v/d    HISTORY OF PRESENT ILLNESS:                The patient is a 68 y.o. female with significant past medical history of kidney stones requiring ureteroscopic treatment who presented to [facility] secondary to flank pain and n/v/d. Pt's creatinine was noted to be elevated to 4.5 and CT imaging revealed a 1.2 cm proximal right ureteral stone with hydronephrosis. Pt has been transferred to [facility] and admitted to the Hospitalist service with consultation to urology for further management. Pt reports she had a sudden onset of right flank pain 8/15/2020 that has continued intermittently since that time.  + diarrhea and increased stooling since that time and nausea with at least 2 episodes of emesis. She has been able to keep down cranberry juice and water but admits to decreased oral intake since 8/15. Reports intermittent fever/chills at home for which she has been using ibuprofen. Reports temp high at home 100.5. Pt denies gross hematuria, significant dysuria. Currently with pain 8/10 in right flank. T max since admission 99.3.   WBC 11.7 and creatinine 3.7 this am.      Past Medical History:        Diagnosis Date    Anxiety     Breast cancer ([facility] [address]     Fibromyalgia     Hyperlipidemia     Hypertension     IBS (irritable bowel syndrome)     Kidney stones     Nausea & vomiting     Prolonged emergence from general anesthesia      Past Surgical History:        Procedure Laterality Date    [place] COLONOSCOPY  2007    CYSTOSCOPY  4/27/16    Right Ureteroscopy Laser Lithotripsy Basket Retrieval of Stone Fragments Right Ureteral Stent Placement, Left Ureteroscopy Basket Retrieval of Stone Left Ureteral Stent Placement - Dr. Jose Barajas CYSTOURETHROSCOPY Left 2013    HOLMIUM LASER LITHOTRIPSY, BASKET EXTRACTION  STENT INSERTION     DILATION AND CURETTAGE OF UTERUS      x3    HYSTERECTOMY      LITHOTRIPSY      OTHER SURGICAL HISTORY      samuel?  URETEROSCOPY         Social History     Socioeconomic History    Marital status:      Spouse name: Not on file    Number of children: Not on file    Years of education: Not on file    Highest education level: Not on file   Occupational History    Not on file   Social Needs    Financial resource strain: Not on file    Food insecurity     Worry: Not on file     Inability: Not on file    Transportation needs     Medical: Not on file     Non-medical: Not on file   Tobacco Use    Smoking status: Former Smoker     Packs/day: 0.50     Years: 6.00     Pack years: 3.00     Last attempt to quit: 1973     Years since quittin.1    Smokeless tobacco: Never Used   Substance and Sexual Activity    Alcohol use: Yes     Comment: OCC.     Drug use: No    Sexual activity: Not on file   Lifestyle    Physical activity     Days per week: Not on file     Minutes per session: Not on file    Stress: Not on file   Relationships    Social connections     Talks on phone: Not on file     Gets together: Not on file     Attends Confucianist service: Not on file     Active member of club or organization: Not on file     Attends meetings of clubs or organizations: Not on file     Relationship status: Not on file    Intimate partner violence     Fear of current or ex partner: Not on file     Emotionally abused: Not on file     Physically abused: Not on file     Forced sexual activity: Not on file   Other Topics Concern    Not on file   Social History Narrative    Not on file       AL  Family History   Problem Relation Age of Onset    Arthritis Mother     Cancer Mother     Stroke Mother     Cancer Maternal Aunt     Stroke Maternal Grandmother     Cancer Maternal Grandfather     Heart Disease Paternal Grandmother     Heart Disease Father        Allergies: Allergies   Allergen Reactions    Asa [Aspirin] Other (See Comments)     shaky       ROS:  Constitutional: Negative for chills, fatigue, fever, or weight loss. Eyes: Denies reported visual changes. ENT: Denies headache, difficulty swallowing, nose bleeds, ringing in ears, or earaches. Cardiovascular: Negative for chest pain, palpitations, tachycardia or edema. Respiratory: Denies cough or SOB. GI:  See HPI  : See HPI  Musculoskeletal: Patient denies low back pain or painful or reduced ROM of the back or extremities. Neurological: The patient denies any symptoms of neurological impairment or               TIA's; no history of stroke. Lymphatic: Denies swollen glands in neck, axillary or inguinal areas. Psychiatric: Denies anxiety or depression. Skin: Denies rash or lesions. The remainder of the complete ROS is negative    PHYSICAL EXAM:  VITALS:  BP (!) 189/83   Pulse 64   Temp 99.3 °F (37.4 °C) (Oral)   Resp 16   Ht 5' 2\" (1.575 m)   Wt 125 lb 6.4 oz (56.9 kg)   SpO2 96%   BMI 22.94 kg/m² . Nursing note and vitals reviewed. Constitutional:    Alert and oriented times 3, no acute distress and cooperative to examination with appropriate mood and affect. HEENT:   Head:         Normocephalic and atraumatic. Mouth/Throat:          Mucous membranes are normal.   Eyes:         EOM are normal. No scleral icterus. Nose:    The external appearance of the nose is normal  Ears: The ears appear normal to external inspection   Neck:         Supple, symmetrical, trachea midline, no adenopathy, thyroid symmetric, not enlarged and no tenderness. Cardiovascular:        Normal rate, regular rhythm, S1 S2 heart sounds. Pulmonary/Chest:       Chest symmetric with normal A/P diameter, no wheezes, rales, or rhonchi noted. Normal respiratory rate and rhthym. No use of accessory muscles. Abdominal:          Soft. + tenderness R UQ and R CVA tenderness. Active bowel sounds. Musculoskeletal:    Normal range of motion. She exhibits no edema or tenderness of lower extremities. Extremities:    No cyanosis, clubbing, or edema present. Neurological:    Alert and oriented. No cranial nerve deficit. There are no focalizing motor or sensory deficits.     DATA:  CBC:   Lab Results   Component Value Date    WBC 11.7 08/21/2020    RBC 3.70 08/21/2020    RBC 3.90 04/15/2016    HGB 11.0 08/21/2020    HCT 33.2 08/21/2020    MCV 89.7 08/21/2020    MCH 29.7 08/21/2020    MCHC 33.1 08/21/2020    RDW 13.9 04/16/2016     08/21/2020    MPV 11.1 08/21/2020     BMP:    Lab Results   Component Value Date     08/21/2020    K 3.8 08/21/2020     08/21/2020    CO2 17 08/21/2020     08/21/2020    CREATININE 3.7 08/21/2020    CREATININE 1.0 11/20/2013    CALCIUM 9.3 08/21/2020    LABGLOM 12 08/21/2020    GLUCOSE 159 08/21/2020    GLUCOSE 89 04/15/2016     BUN/Creatinine:    Lab Results   Component Value Date     08/21/2020    CREATININE 3.7 08/21/2020    CREATININE 1.0 11/20/2013     Magnesium:  No results found for: MG  Phosphorus:  No results found for: PHOS  PT/INR:    Lab Results   Component Value Date    INR 1.10 08/21/2020     U/A:    Lab Results   Component Value Date    NITRITE neg 05/26/2016    COLORU YELLOW 08/21/2020    PHUR 5.0 08/21/2020    WBCUA > 100 08/21/2020    RBCUA 0-2 08/21/2020    YEAST NONE SEEN 08/21/2020    BACTERIA NONE SEEN 08/21/2020    LEUKOCYTESUR LARGE 08/21/2020    UROBILINOGEN 0.2 08/21/2020    BILIRUBINUR NEGATIVE 08/21/2020    BLOODU TRACE 08/21/2020    GLUCOSEU NEGATIVE 08/21/2020     Labs from 8/20/2020 at [facility]:    WBC-9  hgb-11.6  hct-33.8  plt-142  Na-130  K-3.9  Cl-96  CO2-21  Glu-99  BUN-115  Creatinine-4.5  UA-0-2 RBC, 20-30 WBC, 3+bacteria, small LE, 3-5 epithelial cells--culture performed      Last creatinine at [facility] 11/11/19--0.4    Imaging: The patient has had a CT abd/pelvis without contrast at [facility]. Images not available at this time to review. Report notes \"moderate right hydronephrosis and mild perinephric fat stranding. A 1.2 cm stone is present within the proximal ureter. No additional stone within the right kidney. No left hydronephrosis. 2 nonobstructing stones within the lower pole measuring 7 and 4 mm in size. IMPRESSION:   R flank pain  R 1.2 cm proximal ureteral calculus with hydronephrosis  RAGINI  N/v/d  L nonobstructive nephrolithiasis    Plan:      Pt with reportedly large proximal ureteral calculus with acute elevation in creatinine. Was 4.5 at outside facility and slightly improved to 3.7 currently. Obtain PACs images from [facility], [facility]. Keep NPO. Noted temps prior to admission. We will plan for right ureteral stent today with Dr. Sondra Koo with plans for definitive stone therapy in follow up. I described the procedure in detail and also described the associated risks and benefits at length. We discussed possible alternative therapies. We discussed the risks and benefits of not undergoing therapy. Patient understands these risks and benefits and desires to proceed. Keep pt NPO. Obtain Consent. Pt will be scheduled for cystoscopy with right ureteral stent placement today with Dr. Sondra Koo. Continue fluids, antiemetics, antibiotics, analgesia. ? If NSAID use and diarrhea contributed to acute elevation in creatinine. Hospitalist managing ragini and diarrhea.       Thank you for including us in the care of Gerri Mcneil 15, APRN-CNP  08/21/20 8:45 AM  Urology

## 2020-08-22 LAB
ANION GAP SERPL CALCULATED.3IONS-SCNC: 11 MEQ/L (ref 8–16)
ANISOCYTOSIS: PRESENT
BASOPHILS # BLD: 0.3 %
BASOPHILS ABSOLUTE: 0.1 THOU/MM3 (ref 0–0.1)
BUN BLDV-MCNC: 79 MG/DL (ref 7–22)
CALCIUM SERPL-MCNC: 8.6 MG/DL (ref 8.5–10.5)
CHLORIDE BLD-SCNC: 114 MEQ/L (ref 98–111)
CO2: 15 MEQ/L (ref 23–33)
CREAT SERPL-MCNC: 2.2 MG/DL (ref 0.4–1.2)
CRENATED RBC'S: ABNORMAL
DIFFERENTIAL TYPE: ABNORMAL
DOHLE BODIES: PRESENT
EOSINOPHIL # BLD: 0 %
EOSINOPHILS ABSOLUTE: 0 THOU/MM3 (ref 0–0.4)
ERYTHROCYTE [DISTWIDTH] IN BLOOD BY AUTOMATED COUNT: 15.4 % (ref 11.5–14.5)
ERYTHROCYTE [DISTWIDTH] IN BLOOD BY AUTOMATED COUNT: 51.9 FL (ref 35–45)
GFR SERPL CREATININE-BSD FRML MDRD: 22 ML/MIN/1.73M2
GLUCOSE BLD-MCNC: 209 MG/DL (ref 70–108)
HCT VFR BLD CALC: 28.2 % (ref 37–47)
HEMOGLOBIN: 9.2 GM/DL (ref 12–16)
IMMATURE GRANS (ABS): 0.35 THOU/MM3 (ref 0–0.07)
IMMATURE GRANULOCYTES: 1.9 %
LYMPHOCYTES # BLD: 4.7 %
LYMPHOCYTES ABSOLUTE: 0.9 THOU/MM3 (ref 1–4.8)
MCH RBC QN AUTO: 30.2 PG (ref 26–33)
MCHC RBC AUTO-ENTMCNC: 32.6 GM/DL (ref 32.2–35.5)
MCV RBC AUTO: 92.5 FL (ref 81–99)
MONOCYTES # BLD: 2.6 %
MONOCYTES ABSOLUTE: 0.5 THOU/MM3 (ref 0.4–1.3)
NUCLEATED RED BLOOD CELLS: 0 /100 WBC
PATHOLOGIST REVIEW: ABNORMAL
PLATELET # BLD: 97 THOU/MM3 (ref 130–400)
PLATELET ESTIMATE: ABNORMAL
PMV BLD AUTO: 11.1 FL (ref 9.4–12.4)
POIKILOCYTES: ABNORMAL
POTASSIUM SERPL-SCNC: 3.9 MEQ/L (ref 3.5–5.2)
RBC # BLD: 3.05 MILL/MM3 (ref 4.2–5.4)
SCAN OF BLOOD SMEAR: NORMAL
SEG NEUTROPHILS: 90.5 %
SEGMENTED NEUTROPHILS ABSOLUTE COUNT: 16.5 THOU/MM3 (ref 1.8–7.7)
SODIUM BLD-SCNC: 140 MEQ/L (ref 135–145)
TOXIC GRANULATION: PRESENT
WBC # BLD: 18.2 THOU/MM3 (ref 4.8–10.8)

## 2020-08-22 PROCEDURE — 6370000000 HC RX 637 (ALT 250 FOR IP): Performed by: UROLOGY

## 2020-08-22 PROCEDURE — 87077 CULTURE AEROBIC IDENTIFY: CPT

## 2020-08-22 PROCEDURE — 6360000002 HC RX W HCPCS: Performed by: INTERNAL MEDICINE

## 2020-08-22 PROCEDURE — 2580000003 HC RX 258: Performed by: INTERNAL MEDICINE

## 2020-08-22 PROCEDURE — 87186 SC STD MICRODIL/AGAR DIL: CPT

## 2020-08-22 PROCEDURE — 85025 COMPLETE CBC W/AUTO DIFF WBC: CPT

## 2020-08-22 PROCEDURE — 99232 SBSQ HOSP IP/OBS MODERATE 35: CPT | Performed by: NURSE PRACTITIONER

## 2020-08-22 PROCEDURE — 87086 URINE CULTURE/COLONY COUNT: CPT

## 2020-08-22 PROCEDURE — 99232 SBSQ HOSP IP/OBS MODERATE 35: CPT | Performed by: INTERNAL MEDICINE

## 2020-08-22 PROCEDURE — 36415 COLL VENOUS BLD VENIPUNCTURE: CPT

## 2020-08-22 PROCEDURE — 80048 BASIC METABOLIC PNL TOTAL CA: CPT

## 2020-08-22 PROCEDURE — 2140000000 HC CCU INTERMEDIATE R&B

## 2020-08-22 RX ADMIN — CITALOPRAM 10 MG: 20 TABLET, FILM COATED ORAL at 20:57

## 2020-08-22 RX ADMIN — PIPERACILLIN AND TAZOBACTAM 3.38 G: 3; .375 INJECTION, POWDER, LYOPHILIZED, FOR SOLUTION INTRAVENOUS at 13:00

## 2020-08-22 RX ADMIN — TAMSULOSIN HYDROCHLORIDE 0.4 MG: 0.4 CAPSULE ORAL at 10:26

## 2020-08-22 RX ADMIN — ACETAMINOPHEN 650 MG: 325 TABLET ORAL at 01:19

## 2020-08-22 RX ADMIN — PIPERACILLIN AND TAZOBACTAM 3.38 G: 3; .375 INJECTION, POWDER, LYOPHILIZED, FOR SOLUTION INTRAVENOUS at 23:39

## 2020-08-22 RX ADMIN — Medication 1 CAPSULE: at 10:26

## 2020-08-22 RX ADMIN — ACETAMINOPHEN 650 MG: 325 TABLET ORAL at 19:57

## 2020-08-22 RX ADMIN — Medication 4.5 MG: at 20:57

## 2020-08-22 RX ADMIN — FENOFIBRATE 160 MG: 160 TABLET ORAL at 10:26

## 2020-08-22 RX ADMIN — MULTIPLE VITAMINS W/ MINERALS TAB 1 TABLET: TAB at 10:26

## 2020-08-22 ASSESSMENT — PAIN DESCRIPTION - PROGRESSION: CLINICAL_PROGRESSION: NOT CHANGED

## 2020-08-22 ASSESSMENT — PAIN SCALES - GENERAL
PAINLEVEL_OUTOF10: 0
PAINLEVEL_OUTOF10: 3
PAINLEVEL_OUTOF10: 0
PAINLEVEL_OUTOF10: 4

## 2020-08-22 ASSESSMENT — PAIN - FUNCTIONAL ASSESSMENT: PAIN_FUNCTIONAL_ASSESSMENT: PREVENTS OR INTERFERES SOME ACTIVE ACTIVITIES AND ADLS

## 2020-08-22 ASSESSMENT — PAIN DESCRIPTION - ONSET: ONSET: GRADUAL

## 2020-08-22 ASSESSMENT — PAIN DESCRIPTION - LOCATION: LOCATION: HAND

## 2020-08-22 ASSESSMENT — PAIN DESCRIPTION - ORIENTATION: ORIENTATION: MID;POSTERIOR

## 2020-08-22 ASSESSMENT — PAIN DESCRIPTION - PAIN TYPE: TYPE: CHRONIC PAIN

## 2020-08-22 ASSESSMENT — PAIN DESCRIPTION - FREQUENCY: FREQUENCY: INTERMITTENT

## 2020-08-22 ASSESSMENT — PAIN DESCRIPTION - DESCRIPTORS: DESCRIPTORS: ACHING

## 2020-08-22 NOTE — PROGRESS NOTES
Hospitalist Progress Note    Patient:  Roxie Rivera  YOB: 1950  MRN: [id number]   PCP: Abbey Burgess MD         Acct: [de-identified]  Unit/Bed: [address] [place] [address]    Date of Admission: 8/20/2020      ASSESSMENT/ PLAN     1. Sepsis Acute Pyohydronephrosis/ acute right pyelonephritis secondary to obstructing right ureteral stone complicated by obstructive uropathy s/p ureteral stent on 8/21: CT scan of the abdomen/pelvis showed moderate right hydronephrosis with an obstructing 1.2 cm stone in the right proximal ureter. On Zosyn. Urine culture showing gram negative bacilli. Blood cultures negative to date. Sepsis based on leukocytosis and tachycardia  2. Hypotension/ Dehydration: IV fluids  3. Acute kidney injury/ obstructive uropathy:  Cr up to 3.7. Secondary to pre-renal azotemia in setting of dehydration and obstructive uropathy. Continue to hydrate. Likely to continue to improve following stent placement. 4. History of breast cancer in remission s/p chemotherapy  5. 3.7 cm right adnexal cyst: check CA-125. Will need follow up with Ob/GYN as an outpatient  6. HTN  7. Fibromyalgia  8. Chronic anxiety    Anticipated Discharge in : 1-2 days    Code Status: Full Code    Electronically signed by Kely Huff MD on 8/22/2020 at 1:48 PM      Chief Complaint     Back pain, abdominal pain, nausea    SUBJECTIVE     The patient is a 68 y.o. female w/ PMH of HTN, breast cancer s/p chemotherapy and nephrolithiasis requiring lithotripsy who presented to the hospital with approximately a 1 week history of lower right sided back/ flank pain and lower abdominal pain associated with nausea and altered mental status. She had also seen her chiropractor about 1 week ago and a urinalysis showed bacteria, but she did not have symptoms at that time.  Her symptoms progressed and she was eventually evaluated at  at [facility] where CT scan of abdomen/pelvis showed a right sided 1.2 cm obstructing right proximal ureteral stone with moderate right sided hydronephrosis. Her WBC was not elevated at that time but by the time she arrived to [facility], her WBC was as high as 18,200. Her creatinine was also elevated to 4.5 and her sodium was 130. She was seen in consultation by Urology and a right ureteral stent was placed on 8/21/2020. Of note, pus was noted to be draining from the right ureteral orifice consistent with pyohydronephrosis. The patient was since started on Zosyn. 8/22/2020: Flank and abdominal pain improved. No fevers. Creatinine still elevated at 2.2    OBJECTIVE     Medications:  Reviewed    Infusion Medications    sodium chloride 100 mL/hr at 08/22/20 1024     Scheduled Medications    piperacillin-tazobactam  3.375 g Intravenous Q12H    citalopram  10 mg Oral Nightly    fenofibrate  160 mg Oral Daily    melatonin  4.5 mg Oral Nightly    therapeutic multivitamin-minerals  1 tablet Oral Daily    lactobacillus  1 capsule Oral Daily    sodium chloride flush  10 mL Intravenous 2 times per day    [Held by provider] enoxaparin  40 mg Subcutaneous Daily    tamsulosin  0.4 mg Oral Daily     PRN Meds: docusate sodium, sodium chloride flush, acetaminophen, polyethylene glycol, promethazine **OR** ondansetron, oxyCODONE-acetaminophen    Ins and outs:      Intake/Output Summary (Last 24 hours) at 8/22/2020 1348  Last data filed at 8/22/2020 1335  Gross per 24 hour   Intake 9711.66 ml   Output 1150 ml   Net 8561.66 ml       Physical Examination     /64   Pulse 51   Temp 98.1 °F (36.7 °C) (Oral)   Resp 20   Ht 5' 2\" (1.575 m)   Wt 124 lb 9.6 oz (56.5 kg)   SpO2 97%   BMI 22.79 kg/m²     General appearance: No apparent distress. Thin appearing  HEENT: Extraocular motion intact. Trachea midline. Neck: Supple. Respiratory:  CTA bilaterally without rales/wheezes/rhonchi. Cardiovascular: RRR with normal S1/S2 without murmurs, rubs or gallops.   Abdomen: Soft, mild tenderness to palpation at lower abdomen, non-distended with normal bowel sounds. No bilateral CVA tenderness  Musculoskeletal: Patient is moving extremities x 4 spontaneously  Neurologic: Grossly non focal. CN: II-XII intact  Psychiatric: Alert and oriented  Vascular: Dorsalis pedis palpable bilaterally. Radial pulses palpable bilaterally. Skin:  No visible rashes or lesions. Labs     Recent Labs     08/21/20 0045 08/22/20 0416   WBC 11.7* 18.2*   HGB 11.0* 9.2*   HCT 33.2* 28.2*    97*     Recent Labs     08/21/20 0045 08/22/20 0416   * 140   K 3.8 3.9    114*   CO2 17* 15*   * 79*   CREATININE 3.7* 2.2*   CALCIUM 9.3 8.6     Recent Labs     08/21/20 0045   AST 35   ALT 60   BILITOT 0.2*   ALKPHOS 98     Recent Labs     08/21/20 0045   INR 1.10     No results for input(s): Camilo Hernandez in the last 72 hours. Urinalysis:      Lab Results   Component Value Date    NITRU NEGATIVE 08/21/2020    WBCUA > 100 08/21/2020    BACTERIA NONE SEEN 08/21/2020    RBCUA 0-2 08/21/2020    BLOODU TRACE 08/21/2020    GLUCOSEU NEGATIVE 08/21/2020       Diagnostic imaging/procedures       CT COMPARISON OF OUTSIDE FILMS   Final Result          Ct Comparison Of Outside Films    Result Date: 8/21/2020  Radiology exam is complete. No Radiologist dictation. Please follow up with ordering provider.        DVT prophylaxis: [x] Lovenox to be restarted tomorrow                                 [x] SCDs                                 [] SQ Heparin                                 [] Encourage ambulation           [] Already on Anticoagulation     Disposition:    [x] Home       [] TCU       [] Rehab       [] Psych       [] SNF       [] [facility]       [] Other-

## 2020-08-22 NOTE — OP NOTE
[address] [place] [address]                                OPERATIVE REPORT    PATIENT NAME: Nuha Fernandez                     :        1950  MED REC NO:   [id number]                           ROOM:       9221  ACCOUNT NO:   [de-identified]                           ADMIT DATE: 2020  PROVIDER:     DAVID Sexton OF PROCEDURE:  2020    PREOPERATIVE DIAGNOSIS:  Right proximal ureteral calculus. POSTOPERATIVE DIAGNOSES:  1. Right proximal ureteral calculus. 2.  Pyohydronephrosis. PROCEDURES PERFORMED:  1. Cystoscopy. 2.  Insertion of 6-Citizen of [place] multi-length double-J stent. ANESTHESIA:  General.    SURGEON:  Kathryn Sen MD    INDICATIONS:  This is a 75-year-old white female with a history of  recurrent nephrolithiasis. She came into the hospital early this  morning, complaining of right flank pain and abdominal pain. This has  been going on for approximately two days. As part of her evaluation, a  stone protocol CT scan was obtained, which I reviewed. She has two  nonobstructing stones in her left kidney. She has a 1.2 cm stone in the  proximal right ureter with hydronephrosis/obstruction proximal to this. She has a large incidental gallstone seen on CT scan as well. She  continues to have pain, nausea, vomiting, and dysuria. She has been  stabilized, but is acutely ill. As such I recommended cystoscopy with  stent insertion, allow her acute illness to subside/resolve, and then at  the appropriate time, come back for definitive treatment of the stone. She understands the procedures I recommended, common side effects,  success rate, and potential complications. OPERATIVE NOTE:  After informed consent was signed and the patient  properly identified, the patient was taken to the operating room and  placed on the operating room table in supine position. She was given  general anesthesia. Endotracheal tube was used for an airway. She was  placed in the lithotomy position. Genitalia were prepped and draped in  the usual sterile manner. A 22-[place] Islander cystoscope sheath and 30-degree  lens were used to inspect the urethra, which appeared normal.  The  bladder was entered and drained. There was sightly cloudy urine noted  within the bladder. The bladder mucosa appeared normal.  There was no  tumor, stone, or foreign body seen. The trigone was normal.  Ureteral  orifices appeared normal.  A spot film on fluoroscopy shows an oval  calcification where the proximal ureteral stone would be located. Next,  a 0.035-inch diameter Dual-Flex wire was passed through the cystoscopic  sheath, cannulated the right ureteral orifice and under fluoroscopic  guidance, was manipulated around the stone into the right renal pelvis. Over the wire a 6-[place] Islander multi-length double-J stent was passed  proximally into the renal pelvis and distally into the bladder. Once  wire was removed, the position in the stent was excellent with curling  in the renal pelvis and the bladder. It should be noted a large amount  of purulent fluid was expelled from the right ureteral orifice,  consistent with pyohydronephrosis. The patient tolerated the procedure  well with no apparent complications. Minimal blood loss. The patient  returned to PACU.         Herma Fleischer, M.D.    D: 08/21/2020 14:01:15       T: 08/21/2020 19:12:45     [email or web address]  Job#: [id number]     Doc#: [id number]    CC:

## 2020-08-22 NOTE — PROGRESS NOTES
Pharmacy Renal Adjustment    Carlin Ray is a 68 y.o. female. Pharmacy renally adjust the following medications per P&T approved policy: piperacillin-tazobactam    Recent Labs     08/21/20  0045 08/22/20  0416   * 79*       Recent Labs     08/21/20  0045 08/22/20  0416   CREATININE 3.7* 2.2*       Estimated Creatinine Clearance: 19 mL/min (A) (based on SCr of 2.2 mg/dL (H)).     Height:   Ht Readings from Last 1 Encounters:   08/20/20 5' 2\" (1.575 m)     Weight:  Wt Readings from Last 1 Encounters:   08/22/20 124 lb 9.6 oz (56.5 kg)       Plan: Adjustments based on renal function:          Decrease piperacillin-tazobactam to 3.375 grams IV every 12 hours instead of every 8 hours    Gerardo Soto, PharmD, BCPS  8/22/2020  10:00 AM

## 2020-08-22 NOTE — PROGRESS NOTES
08/22/2020    LABGLOM 22 08/22/2020       Impression:  R proximal ureteral calculus 1.2 cm in size  Pyohydronephrosis  Acute pyelonephritis with sepsis  RAGINI, improving  N/V resolved  Diarrhea  Anemia  L nonobstructive nephrolithiasis      Plan:    POD #1 cystoscopy and insertion of 6-[place] multi-length Double J stent by Dr. Tor Mejia 8/21/2020. Findings noted \"a large amount of purulent fluid was expelled from the right ureteral orifice, consistent with pyohydronephrosis. Pt will need definitive stone treatment outpatient in 2-3 weeks after resolution of infection. Discussed with pt and  and they would like to see Dr. Tor Mejia in the office in follow up prior to scheduling surgery. Schedule office visit in 2 weeks to check UA and discuss definitive stone treatment. Pt denies pain. Creatinine improving. WBC up however pt is clinically improved. Noted anemia--monitor for hematuria but at this time pt denies significant hematuria.      MANUEL Silverman-CNP  08/22/20 8:34 AM  Urology

## 2020-08-22 NOTE — PLAN OF CARE
Problem: Pain:  Description: Pain management should include both nonpharmacologic and pharmacologic interventions. Goal: Pain level will decrease  Outcome: Ongoing  Note: The patient has had no c/o pain this shift. Goal: Control of acute pain  Outcome: Ongoing  Goal: Control of chronic pain  Outcome: Ongoing     Problem: Falls - Risk of:  Goal: Will remain free from falls  Outcome: Ongoing  Note: The patient has been free of falls this shift. Bed is in low position, 2/4 rails are up, and alarm is active. Call light is in reach, 2/4 rails are up, and hourly rounding performed. Goal: Absence of physical injury  Outcome: Ongoing     Problem: Discharge Planning:  Goal: Participates in care planning  Outcome: Ongoing  Note: Discharge is pending at this time. Goal: Discharged to appropriate level of care  Outcome: Ongoing     Problem: Fluid Volume - Deficit:  Goal: Absence of fluid volume deficit signs and symptoms  Outcome: Ongoing  Note: The patient has NS running at 75ml/hr. Goal: Electrolytes within specified parameters  Outcome: Ongoing     Problem: Skin Integrity - Impaired:  Goal: Will show no infection signs and symptoms  Outcome: Ongoing  Note: No new skin breakdown this shift. Goal: Absence of new skin breakdown  Outcome: Ongoing     Problem: Urinary Elimination:  Goal: Ability to achieve a balanced intake and output will improve  Outcome: Ongoing  Note: The patient has been having adequate amounts of urinary output. It is slightly bloody and cloudy d/t to the infection in her kidney and procedure that was completed on 8/21. Goal: Ability to recognize the need to void and respond appropriately will improve  Outcome: Ongoing  Goal: Will remain free from infection  Outcome: Ongoing   Care plan reviewed with patient. The patient verbalizes understanding and contributed to plan of care.

## 2020-08-23 ENCOUNTER — APPOINTMENT (OUTPATIENT)
Dept: GENERAL RADIOLOGY | Age: 70
DRG: 854 | End: 2020-08-23
Attending: INTERNAL MEDICINE
Payer: MEDICARE

## 2020-08-23 LAB
ANION GAP SERPL CALCULATED.3IONS-SCNC: 10 MEQ/L (ref 8–16)
ANION GAP SERPL CALCULATED.3IONS-SCNC: 11 MEQ/L (ref 8–16)
BUN BLDV-MCNC: 76 MG/DL (ref 7–22)
BUN BLDV-MCNC: 89 MG/DL (ref 7–22)
CA 125: 13 U/ML
CALCIUM SERPL-MCNC: 8.9 MG/DL (ref 8.5–10.5)
CALCIUM SERPL-MCNC: 9 MG/DL (ref 8.5–10.5)
CHLORIDE BLD-SCNC: 113 MEQ/L (ref 98–111)
CHLORIDE BLD-SCNC: 115 MEQ/L (ref 98–111)
CO2: 14 MEQ/L (ref 23–33)
CO2: 15 MEQ/L (ref 23–33)
CREAT SERPL-MCNC: 1.6 MG/DL (ref 0.4–1.2)
CREAT SERPL-MCNC: 2 MG/DL (ref 0.4–1.2)
ERYTHROCYTE [DISTWIDTH] IN BLOOD BY AUTOMATED COUNT: 15.9 % (ref 11.5–14.5)
ERYTHROCYTE [DISTWIDTH] IN BLOOD BY AUTOMATED COUNT: 54.9 FL (ref 35–45)
GFR SERPL CREATININE-BSD FRML MDRD: 25 ML/MIN/1.73M2
GFR SERPL CREATININE-BSD FRML MDRD: 32 ML/MIN/1.73M2
GLUCOSE BLD-MCNC: 145 MG/DL (ref 70–108)
GLUCOSE BLD-MCNC: 156 MG/DL (ref 70–108)
HCT VFR BLD CALC: 30.2 % (ref 37–47)
HEMOGLOBIN: 9.7 GM/DL (ref 12–16)
MAGNESIUM: 2.1 MG/DL (ref 1.6–2.4)
MCH RBC QN AUTO: 30.2 PG (ref 26–33)
MCHC RBC AUTO-ENTMCNC: 32.1 GM/DL (ref 32.2–35.5)
MCV RBC AUTO: 94.1 FL (ref 81–99)
ORGANISM: ABNORMAL
PLATELET # BLD: 103 THOU/MM3 (ref 130–400)
PMV BLD AUTO: 11.8 FL (ref 9.4–12.4)
POTASSIUM SERPL-SCNC: 3.8 MEQ/L (ref 3.5–5.2)
POTASSIUM SERPL-SCNC: 4 MEQ/L (ref 3.5–5.2)
RBC # BLD: 3.21 MILL/MM3 (ref 4.2–5.4)
SODIUM BLD-SCNC: 138 MEQ/L (ref 135–145)
SODIUM BLD-SCNC: 140 MEQ/L (ref 135–145)
URINE CULTURE REFLEX: ABNORMAL
WBC # BLD: 19.7 THOU/MM3 (ref 4.8–10.8)

## 2020-08-23 PROCEDURE — 6370000000 HC RX 637 (ALT 250 FOR IP): Performed by: UROLOGY

## 2020-08-23 PROCEDURE — 85027 COMPLETE CBC AUTOMATED: CPT

## 2020-08-23 PROCEDURE — 2580000003 HC RX 258: Performed by: INTERNAL MEDICINE

## 2020-08-23 PROCEDURE — 83735 ASSAY OF MAGNESIUM: CPT

## 2020-08-23 PROCEDURE — 51798 US URINE CAPACITY MEASURE: CPT

## 2020-08-23 PROCEDURE — 80048 BASIC METABOLIC PNL TOTAL CA: CPT

## 2020-08-23 PROCEDURE — 36415 COLL VENOUS BLD VENIPUNCTURE: CPT

## 2020-08-23 PROCEDURE — 86304 IMMUNOASSAY TUMOR CA 125: CPT

## 2020-08-23 PROCEDURE — 6360000002 HC RX W HCPCS: Performed by: INTERNAL MEDICINE

## 2020-08-23 PROCEDURE — 2140000000 HC CCU INTERMEDIATE R&B

## 2020-08-23 PROCEDURE — 94760 N-INVAS EAR/PLS OXIMETRY 1: CPT

## 2020-08-23 PROCEDURE — 99232 SBSQ HOSP IP/OBS MODERATE 35: CPT | Performed by: NURSE PRACTITIONER

## 2020-08-23 PROCEDURE — 99232 SBSQ HOSP IP/OBS MODERATE 35: CPT | Performed by: INTERNAL MEDICINE

## 2020-08-23 PROCEDURE — 74018 RADEX ABDOMEN 1 VIEW: CPT

## 2020-08-23 RX ADMIN — CITALOPRAM 10 MG: 20 TABLET, FILM COATED ORAL at 21:05

## 2020-08-23 RX ADMIN — FENOFIBRATE 160 MG: 160 TABLET ORAL at 09:58

## 2020-08-23 RX ADMIN — MULTIPLE VITAMINS W/ MINERALS TAB 1 TABLET: TAB at 09:58

## 2020-08-23 RX ADMIN — SODIUM CHLORIDE: 9 INJECTION, SOLUTION INTRAVENOUS at 13:48

## 2020-08-23 RX ADMIN — SODIUM CHLORIDE: 9 INJECTION, SOLUTION INTRAVENOUS at 00:49

## 2020-08-23 RX ADMIN — CEFTRIAXONE SODIUM 2 G: 2 INJECTION, POWDER, FOR SOLUTION INTRAMUSCULAR; INTRAVENOUS at 12:30

## 2020-08-23 RX ADMIN — SODIUM CHLORIDE: 9 INJECTION, SOLUTION INTRAVENOUS at 23:52

## 2020-08-23 RX ADMIN — TAMSULOSIN HYDROCHLORIDE 0.4 MG: 0.4 CAPSULE ORAL at 09:58

## 2020-08-23 RX ADMIN — Medication 1 CAPSULE: at 08:03

## 2020-08-23 RX ADMIN — Medication 4.5 MG: at 21:05

## 2020-08-23 ASSESSMENT — PAIN SCALES - GENERAL
PAINLEVEL_OUTOF10: 0

## 2020-08-23 NOTE — PROCEDURES
A Bladder scan was performed at 1115 . The patient's last void was at 1045 . The residual amount was measured to be 0 ML. Report of results was given to [facility].

## 2020-08-23 NOTE — PLAN OF CARE
Problem: Pain:  Goal: Pain level will decrease  Description: Pain level will decrease  Note: Ongoing assessment & interventions provided throughout shift. Reminded patient to report any pain, pressure, or shortness of breath to the nurse. Pain medications provided per physician's orders. Problem: Falls - Risk of:  Goal: Will remain free from falls  Description: Will remain free from falls  Note: Assessment & interventions provided throughout shift. Bed locked & in low position, call light in reach, side-rails up x2, bed/chair alarm utilized, non-slip socks on when ambulating, reminded patient to use call light to call for assistance. Problem: Discharge Planning:  Goal: Participates in care planning  Description: Participates in care planning  Note: Ongoing assessment for discharge needs     Problem: Fluid Volume - Deficit:  Goal: Absence of fluid volume deficit signs and symptoms  Description: Absence of fluid volume deficit signs and symptoms  Note: IV fluids infusing. Monitoring daily labs       Problem: Skin Integrity - Impaired:  Goal: Will show no infection signs and symptoms  Description: Will show no infection signs and symptoms  Note: Ongoing assessment & interventions provided throughout shift. Skin assessments provided. Encouraging/assisting patient to turn as needed. Care plan reviewed with patient. Patient verbalizes understanding of the care plan and contributed to goal setting.

## 2020-08-23 NOTE — PROGRESS NOTES
Relationship status: Not on file    Intimate partner violence     Fear of current or ex partner: Not on file     Emotionally abused: Not on file     Physically abused: Not on file     Forced sexual activity: Not on file   Other Topics Concern    Not on file   Social History Narrative    Not on file     Family History   Problem Relation Age of Onset    Arthritis Mother     Cancer Mother     Stroke Mother     Cancer Maternal Aunt     Stroke Maternal Grandmother     Cancer Maternal Grandfather     Heart Disease Paternal Grandmother     Heart Disease Father      Allergies   Allergen Reactions    Asa [Aspirin] Other (See Comments)     shaky         Constitutional: Alert and oriented times x3, no acute distress, and cooperative to examination with appropriate mood and affect. HEENT:   Head:         Normocephalic and atraumatic. Mucous membranes are normal.   Eyes:         EOM are normal. No scleral icterus. Nose:    The external appearance of the nose is normal  Ears: The ears appear normal to external inspection. Cardiovascular:       Normal rate, regular rhythm. Pulmonary/Chest:  Normal respiratory rate and rhthym. No use of accessory muscles. Lungs clear bilaterally. Abdominal:          Soft. No tenderness. Active bowel sounds  Musculoskeletal:    Normal range of motion. He exhibits no edema or tenderness of lower extremities. Extremities:    No cyanosis, clubbing, or edema present. Neurological:    Alert and oriented.      Labs:  WBC:    Lab Results   Component Value Date    WBC 19.7 08/23/2020     Hemoglobin/Hematocrit:    Lab Results   Component Value Date    HGB 9.7 08/23/2020    HCT 30.2 08/23/2020     BMP:    Lab Results   Component Value Date     08/23/2020    K 4.0 08/23/2020     08/23/2020    CO2 14 08/23/2020    BUN 89 08/23/2020    LABALBU 2.1 08/21/2020    CREATININE 2.0 08/23/2020    CREATININE 1.0 11/20/2013    CALCIUM 9.0 08/23/2020    LABGLOM 25 08/23/2020 Impression:  R proximal ureteral calculus 1.2 cm in size  Pyohydronephrosis  Acute pyelonephritis with sepsis  RAGINI, improving  N/V resolved  Diarrhea  Anemia  L nonobstructive nephrolithiasis      Plan:    POD #2 cystoscopy and insertion of 6-[place] multi-length Double J stent by Dr. Jesusita Saez 8/21/2020. Findings noted \"a large amount of purulent fluid was expelled from the right ureteral orifice, consistent with pyohydronephrosis. Pt will need definitive stone treatment outpatient in 2-3 weeks after resolution of infection. Recommend 2 weeks of antibiotic therapy at [place]. Discussed with pt and  and they would like to see Dr. Jesusita Saez in the office in follow up prior to scheduling surgery. Schedule office visit in 2 weeks to check UA and discuss definitive stone treatment. Pt denies pain. Creatinine improving. WBC up however pt is clinically improved and now afebrile. Urine culture with E coli sensitive to Zosyn.  Antibiotics per Hospitalist.      Continues with loose stools--per hospitalist    MANUEL Hammer-CNP  08/23/20 8:29 AM  Urology

## 2020-08-23 NOTE — PROGRESS NOTES
female w/ PMH of HTN, breast cancer s/p chemotherapy and nephrolithiasis requiring lithotripsy who presented to the hospital with approximately a 1 week history of lower right sided back/ flank pain and lower abdominal pain associated with nausea and altered mental status. She had also seen her chiropractor about 1 week ago and a urinalysis showed bacteria, but she did not have symptoms at that time. Her symptoms progressed and she was eventually evaluated at  at [facility] where CT scan of abdomen/pelvis showed a right sided 1.2 cm obstructing right proximal ureteral stone with moderate right sided hydronephrosis. Her WBC was not elevated at that time but by the time she arrived to [facility], her WBC was as high as 18,200. Her creatinine was also elevated to 4.5 and her sodium was 130. She was seen in consultation by Urology and a right ureteral stent was placed on 8/21/2020. Of note, pus was noted to be draining from the right ureteral orifice consistent with pyohydronephrosis. The patient was since started on Zosyn. 8/22/2020: Flank and abdominal pain improved. No fevers. Creatinine still elevated at 2.2    8/23/2020: Loose stools. Abdominal fullness. Trend upwards in WBC to 19,700. Weight reported as increased from 125 to 134 lbs, however patient denies lightheadedness or shortness of breath. Urine culture shows E. Coli sensitive to Ceftriaxone.      OBJECTIVE     Medications:  Reviewed    Infusion Medications    sodium chloride 100 mL/hr at 08/23/20 0049     Scheduled Medications    cefTRIAXone (ROCEPHIN) IV  2 g Intravenous Q24H    citalopram  10 mg Oral Nightly    fenofibrate  160 mg Oral Daily    melatonin  4.5 mg Oral Nightly    therapeutic multivitamin-minerals  1 tablet Oral Daily    lactobacillus  1 capsule Oral Daily    sodium chloride flush  10 mL Intravenous 2 times per day    [Held by provider] enoxaparin  40 mg Subcutaneous Daily    tamsulosin  0.4 mg Oral Daily     PRN Meds: docusate sodium, sodium chloride flush, acetaminophen, polyethylene glycol, promethazine **OR** ondansetron, oxyCODONE-acetaminophen    Ins and outs:      Intake/Output Summary (Last 24 hours) at 8/23/2020 1223  Last data filed at 8/23/2020 1148  Gross per 24 hour   Intake 2851.02 ml   Output 1200 ml   Net 1651.02 ml       Physical Examination     /74   Pulse (!) 48   Temp 98.1 °F (36.7 °C) (Oral)   Resp 18   Ht 5' 2\" (1.575 m)   Wt 134 lb 1.6 oz (60.8 kg)   SpO2 96%   BMI 24.53 kg/m²     General appearance: No apparent distress. Thin appearing  HEENT: Extraocular motion intact. Trachea midline. Neck: Supple. Respiratory:  CTA bilaterally without rales/wheezes/rhonchi. Cardiovascular: RRR with normal S1/S2 without murmurs, rubs or gallops. Abdomen: Soft, mild tenderness to palpation at lower abdomen, non-distended with normal bowel sounds. No bilateral CVA tenderness  Musculoskeletal: Patient is moving extremities x 4 spontaneously  Neurologic: Grossly non focal. CN: II-XII intact  Psychiatric: Alert and oriented  Vascular: Dorsalis pedis palpable bilaterally. Radial pulses palpable bilaterally. No peripheral edema  Skin:  No visible rashes or lesions. Labs     Recent Labs     08/21/20 0045 08/22/20 0416 08/23/20 0416   WBC 11.7* 18.2* 19.7*   HGB 11.0* 9.2* 9.7*   HCT 33.2* 28.2* 30.2*    97* 103*     Recent Labs     08/21/20 0045 08/22/20 0416 08/23/20 0416   * 140 140   K 3.8 3.9 4.0    114* 115*   CO2 17* 15* 14*   * 79* 89*   CREATININE 3.7* 2.2* 2.0*   CALCIUM 9.3 8.6 9.0     Recent Labs     08/21/20 0045   AST 35   ALT 60   BILITOT 0.2*   ALKPHOS 98     Recent Labs     08/21/20 0045   INR 1.10     No results for input(s): CKTOTAL, TROPONINI in the last 72 hours.     Urinalysis:      Lab Results   Component Value Date    NITRU NEGATIVE 08/21/2020    WBCUA > 100 08/21/2020    BACTERIA NONE SEEN 08/21/2020    RBCUA 0-2 08/21/2020    BLOODU TRACE 08/21/2020 GLUCOSEU NEGATIVE 08/21/2020       Diagnostic imaging/procedures       CT COMPARISON OF OUTSIDE FILMS   Final Result      XR ABDOMEN (KUB) (SINGLE AP VIEW)    (Results Pending)       Ct Comparison Of Outside Films    Result Date: 8/21/2020  Radiology exam is complete. No Radiologist dictation. Please follow up with ordering provider.        DVT prophylaxis: [x] Lovenox to be restarted tomorrow                                 [x] SCDs                                 [] SQ Heparin                                 [] Encourage ambulation           [] Already on Anticoagulation     Disposition:    [x] Home       [] TCU       [] Rehab       [] Psych       [] SNF       [] [facility]       [] Other-

## 2020-08-24 VITALS
HEART RATE: 67 BPM | DIASTOLIC BLOOD PRESSURE: 80 MMHG | BODY MASS INDEX: 25.23 KG/M2 | TEMPERATURE: 97.9 F | HEIGHT: 62 IN | OXYGEN SATURATION: 97 % | RESPIRATION RATE: 20 BRPM | WEIGHT: 137.1 LBS | SYSTOLIC BLOOD PRESSURE: 120 MMHG

## 2020-08-24 LAB
ANION GAP SERPL CALCULATED.3IONS-SCNC: 5 MEQ/L (ref 8–16)
BUN BLDV-MCNC: 64 MG/DL (ref 7–22)
CALCIUM SERPL-MCNC: 8.4 MG/DL (ref 8.5–10.5)
CHLORIDE BLD-SCNC: 117 MEQ/L (ref 98–111)
CO2: 17 MEQ/L (ref 23–33)
CREAT SERPL-MCNC: 1.2 MG/DL (ref 0.4–1.2)
ERYTHROCYTE [DISTWIDTH] IN BLOOD BY AUTOMATED COUNT: 15.9 % (ref 11.5–14.5)
ERYTHROCYTE [DISTWIDTH] IN BLOOD BY AUTOMATED COUNT: 55 FL (ref 35–45)
GFR SERPL CREATININE-BSD FRML MDRD: 44 ML/MIN/1.73M2
GLUCOSE BLD-MCNC: 118 MG/DL (ref 70–108)
HCT VFR BLD CALC: 32.1 % (ref 37–47)
HEMOGLOBIN: 10.2 GM/DL (ref 12–16)
MCH RBC QN AUTO: 29.8 PG (ref 26–33)
MCHC RBC AUTO-ENTMCNC: 31.8 GM/DL (ref 32.2–35.5)
MCV RBC AUTO: 93.9 FL (ref 81–99)
PLATELET # BLD: 119 THOU/MM3 (ref 130–400)
PMV BLD AUTO: 11.5 FL (ref 9.4–12.4)
POTASSIUM SERPL-SCNC: 3.7 MEQ/L (ref 3.5–5.2)
RBC # BLD: 3.42 MILL/MM3 (ref 4.2–5.4)
REASON FOR REJECTION: NORMAL
REJECTED TEST: NORMAL
SODIUM BLD-SCNC: 139 MEQ/L (ref 135–145)
WBC # BLD: 22.3 THOU/MM3 (ref 4.8–10.8)

## 2020-08-24 PROCEDURE — 2580000003 HC RX 258: Performed by: INTERNAL MEDICINE

## 2020-08-24 PROCEDURE — 6360000002 HC RX W HCPCS: Performed by: INTERNAL MEDICINE

## 2020-08-24 PROCEDURE — 99239 HOSP IP/OBS DSCHRG MGMT >30: CPT | Performed by: INTERNAL MEDICINE

## 2020-08-24 PROCEDURE — 6370000000 HC RX 637 (ALT 250 FOR IP): Performed by: UROLOGY

## 2020-08-24 PROCEDURE — 99232 SBSQ HOSP IP/OBS MODERATE 35: CPT | Performed by: NURSE PRACTITIONER

## 2020-08-24 PROCEDURE — 80048 BASIC METABOLIC PNL TOTAL CA: CPT

## 2020-08-24 PROCEDURE — 85027 COMPLETE CBC AUTOMATED: CPT

## 2020-08-24 PROCEDURE — 36415 COLL VENOUS BLD VENIPUNCTURE: CPT

## 2020-08-24 RX ORDER — AMOXICILLIN AND CLAVULANATE POTASSIUM 875; 125 MG/1; MG/1
1 TABLET, FILM COATED ORAL 2 TIMES DAILY
Qty: 24 TABLET | Refills: 0 | Status: SHIPPED | OUTPATIENT
Start: 2020-08-25 | End: 2020-08-24 | Stop reason: SDUPTHER

## 2020-08-24 RX ORDER — AMOXICILLIN AND CLAVULANATE POTASSIUM 875; 125 MG/1; MG/1
1 TABLET, FILM COATED ORAL 2 TIMES DAILY
Qty: 24 TABLET | Refills: 0 | Status: ON HOLD | OUTPATIENT
Start: 2020-08-25 | End: 2020-09-01 | Stop reason: HOSPADM

## 2020-08-24 RX ORDER — POLYETHYLENE GLYCOL 3350 17 G/17G
17 POWDER, FOR SOLUTION ORAL DAILY PRN
Qty: 527 G | Refills: 1 | Status: SHIPPED | OUTPATIENT
Start: 2020-08-24 | End: 2020-09-23

## 2020-08-24 RX ORDER — METRONIDAZOLE 500 MG/1
500 TABLET ORAL 3 TIMES DAILY
Qty: 30 TABLET | Refills: 0 | Status: SHIPPED
Start: 2020-08-24 | End: 2020-08-24 | Stop reason: HOSPADM

## 2020-08-24 RX ORDER — TAMSULOSIN HYDROCHLORIDE 0.4 MG/1
0.4 CAPSULE ORAL DAILY
Qty: 30 CAPSULE | Refills: 3 | Status: SHIPPED | OUTPATIENT
Start: 2020-08-25 | End: 2020-08-24

## 2020-08-24 RX ORDER — TAMSULOSIN HYDROCHLORIDE 0.4 MG/1
0.4 CAPSULE ORAL DAILY
Qty: 30 CAPSULE | Refills: 3 | Status: SHIPPED | OUTPATIENT
Start: 2020-08-25 | End: 2020-11-09

## 2020-08-24 RX ORDER — POLYETHYLENE GLYCOL 3350 17 G/17G
17 POWDER, FOR SOLUTION ORAL DAILY PRN
Qty: 527 G | Refills: 1 | Status: SHIPPED | OUTPATIENT
Start: 2020-08-24 | End: 2020-08-24

## 2020-08-24 RX ADMIN — TAMSULOSIN HYDROCHLORIDE 0.4 MG: 0.4 CAPSULE ORAL at 09:05

## 2020-08-24 RX ADMIN — Medication 1 CAPSULE: at 09:05

## 2020-08-24 RX ADMIN — MULTIPLE VITAMINS W/ MINERALS TAB 1 TABLET: TAB at 09:05

## 2020-08-24 RX ADMIN — CEFTRIAXONE SODIUM 2 G: 2 INJECTION, POWDER, FOR SOLUTION INTRAMUSCULAR; INTRAVENOUS at 10:35

## 2020-08-24 ASSESSMENT — PAIN SCALES - WONG BAKER
WONGBAKER_NUMERICALRESPONSE: 0

## 2020-08-24 ASSESSMENT — PAIN SCALES - GENERAL
PAINLEVEL_OUTOF10: 0

## 2020-08-24 NOTE — PROGRESS NOTES
CLINICAL PHARMACY: [address] [facility] Select Patient?: No  Total # of Interventions Recommended: 0   -   Total # Interventions Accepted: 0  Intervention Severity:   - Level 1 Intervention Present?: No   - Level 2 #: 0   - Level 3 #: 0   Time Spent (min): 30    Additional Documentation:     Amelia Aleman, PharmD   8/24/2020, 3:34 PM

## 2020-08-24 NOTE — DISCHARGE INSTR - COC
Continuity of Care Form    Patient Name: Naldo Petersen   :  1950  MRN:  [id number]    Admit date:  2020  Discharge date:  ***    Code Status Order: Full Code   Advance Directives:   Advance Care Flowsheet Documentation     Date/Time Healthcare Directive Type of Healthcare Directive Copy in [facility] [address] Agent's Name Healthcare Agent's Phone Number    20  No, patient does not have an advance directive for healthcare treatment -- -- -- -- --          Admitting Physician:  Enriqueta Souza MD  PCP: Anupam Thompson MD    Discharging Nurse: [facility]/Room#: 3B-27/027-A  Discharging Unit Phone Number: ***    Emergency Contact:   Extended Emergency Contact Information  Primary Emergency Contact: [address] [place] [address] Phone: [phone]  Relation: Child  Secondary Emergency Contact: Angelo Ledezma  Address: [facility]  [id number]           /Velma [address] Phone: [phone]  Relation: Child    Past Surgical History:  Past Surgical History:   Procedure Laterality Date    APPENDECTOMY      BREAST LUMPECTOMY  [address]      CYSTOSCOPY  16    Right Ureteroscopy Laser Lithotripsy Basket Retrieval of Stone Fragments Right Ureteral Stent Placement, Left Ureteroscopy Basket Retrieval of Stone Left Ureteral Stent Placement - Dr. Silvestre Sotelo CYSTOURETHROSCOPY Left 2013    HOLMIUM LASER LITHOTRIPSY, BASKET EXTRACTION  STENT INSERTION     DILATION AND CURETTAGE OF UTERUS      x3    HYSTERECTOMY  1997    LITHOTRIPSY      OTHER SURGICAL HISTORY      samuel?     URETEROSCOPY         Immunization History:   Immunization History   Administered Date(s) Administered    Influenza Virus Vaccine 2013       Active Problems:  Patient Active Problem List   Diagnosis Code    Kidney stone N20.0    ARF (acute renal failure) ([facility] N17.9    Sepsis ([facility] A41.9    Pyohydronephrosis N13.6    Pyelonephritis N12    Hypotension I95.9    Dehydration E86.0    Acute kidney injury ([address] N17.9    Obstructive uropathy N13.9    History of breast cancer Z85.3    Simple adnexal cyst greater than 1 cm in diameter in postmenopausal patient N94.89, Z78.0    Essential hypertension I10    Fibromyalgia M79.7    Chronic anxiety F41.9    Loose stools R19.5    Examination for normal comparison for clinical research Z00.6       Isolation/Infection:   Isolation          No Isolation        Patient Infection Status     Infection Onset Added Last Indicated Last Indicated By Review Planned Expiration Resolved Resolved By    C-diff Rule Out 08/24/20 08/24/20 08/24/20 GI Bacterial Pathogens By PCR (Ordered)        Resolved    COVID-19 Rule Out 08/21/20 08/21/20 08/21/20 COVID-19 (Ordered)   08/21/20 Rule-Out Test Resulted          Nurse Assessment:  Last Vital Signs: /80   Pulse 67   Temp 97.9 °F (36.6 °C) (Oral)   Resp 20   Ht 5' 2\" (1.575 m)   Wt 137 lb 1.6 oz (62.2 kg)   SpO2 97%   BMI 25.08 kg/m²     Last documented pain score (0-10 scale): Pain Level: 0  Last Weight:   Wt Readings from Last 1 Encounters:   08/24/20 137 lb 1.6 oz (62.2 kg)     Mental Status:  {IP PT MENTAL STATUS:01438}    IV Access:  { VITALIY IV ACCESS:280109852}    Nursing Mobility/ADLs:  Walking   {[facility] DME WDJR:368257882}  Transfer  {[facility] DME EJLW:497905739}  Bathing  {[facility] DME GYXR:576976621}  Dressing  {[facility] DME ULNL:622900364}  Toileting  {[facility] DME MIYZ:965152631}  Feeding  {[facility] DME ETKT:199938555}  Med Admin  {[facility] DME NWIN:560816699}  Med Delivery   { VITALIY MED Delivery:804773875}    Wound Care Documentation and Therapy:  Incision 07/05/13 Back Left (Active)   Number of days: 2606        Elimination:  Continence:   · Bowel: {YES / TV:07736}  · Bladder: {YES / AC:18353}  Urinary Catheter: {Urinary Catheter:480892025}   Colostomy/Ileostomy/Ileal Conduit: {YES / LN:63400}       Date of Last BM: Treatments After Discharge: ***    Physician Certification: I certify the above information and transfer of Swapnil Peters  is necessary for the continuing treatment of the diagnosis listed and that she requires {Admit to Appropriate Level of Care:31718} for {GREATER/LESS:215785887} 30 days.      Update Admission H&P: {CHP DME Changes in OBTRT:091213792}    PHYSICIAN SIGNATURE:  {Esignature:704423588}

## 2020-08-24 NOTE — FLOWSHEET NOTE
[place] [address] PROGRESS NOTE      Patient: Andrew Chavarria  Room #: 0K-66/116-V            YOB: 1950  Age: 68 y.o. Gender: female            Admit Date & Time: 8/20/2020  9:43 PM    Assessment:  Pt was laying in bed, with her  at her bedside. Pt's  did most of the speaking. Pt was weak and tired, but  encouraged her to participate in the conversation. Pt has 3 children and several grandchildren who live on the [facility] [place]. Pt and her  are members of the [address] [place] [address]. Concern was expressed that the Gnosticism may not make it through the Covid epidemic, because it was in a period of transition at the beginning of the lockdown with no  in place. Pt's  shared that their children and grandchildren do not attend a [employer], which is sad to him and the pt. Interventions:   provided a listening presence, and attempted to draw pt into the conversation.  prayed with the pt and her . Outcomes:  Pt and her  expressed gratitude for the prayer and visit. Plan:  1. Provide spiritual care and support. 2.  Return when pt's  is not present to seek to draw pt out more. Electronically signed by Sung Segura on 8/24/2020 at 12:32 PM.  [facility]  [phone]       08/24/20 1015   Encounter Summary   Services provided to: Patient and family together   Referral/Consult From: [address]; Orthodox/magen community; Children;Family members   Place of Christian   ([facility])   Contact Orthodox Completed   Continue Visiting Yes  (8/24)   Complexity of Encounter Moderate   Length of Encounter 30 minutes   Spiritual Assessment Completed Yes   Spiritual/Lutheran   Type Spiritual support   Assessment Approachable;Coping   Intervention Active listening;Explored

## 2020-08-24 NOTE — DISCHARGE SUMMARY
Hospital Medicine Discharge Summary      Patient:  Phil Villagran  YOB: 1950  MRN: [id number]   PCP: Maya Kasper MD        Acct: [de-identified]     [facility] Date: 8/20/2020     Discharge Date:  8/24/2020        Admitting Physician: Laly Costa MD  Discharge Physician: Laly Costa MD     Discharge Diagnoses     DISCHARGE DIAGNOSES:  1. Sepsis (POA) E. Coli acute Pyohydronephrosis/ acute right pyelonephritis secondary to obstructing right ureteral stone complicated by obstructive uropathy s/p ureteral stent on 8/21, with plans for further lithotripsy as an outpatient  2. Hypotension/ Dehydration:, resolved  3. Acute kidney injury/ obstructive uropathy, resolved  4. Loose stools  5. History of breast cancer in remission s/p chemotherapy  6. 3.7 cm right adnexal cyst: check CA-125. Will need follow up with Ob/GYN as an outpatient  7. HTN  8. Fibromyalgia  9. Chronic anxiety    Disposition:    [x] Home           Condition at Discharge: Stable  and improved    The patient was seen and examined on day of discharge and this discharge summary is in conjunction with any daily progress note from day of discharge. Hospital course     Phil Villagran is a 68 y.o. female w/ PMH of HTN, breast cancer s/p chemotherapy and nephrolithiasis requiring lithotripsy who presented to the hospital with approximately a 1 week history of lower right sided back/ flank pain and lower abdominal pain associated with nausea and altered mental status. She had also seen her chiropractor about 1 week prior to evaluation and a urinalysis showed bacteria, but she did not have symptoms at that time. Her symptoms progressed and she was eventually evaluated at [facility] where CT scan of abdomen/pelvis showed a right sided 1.2 cm obstructing right proximal ureteral stone with moderate right sided hydronephrosis. Her WBC was not elevated at that time but by the time she arrived to [facility], her WBC was as high as 18,200. Her creatinine was also elevated to 4.5 and her sodium was 130. She was seen in consultation by Urology and a right ureteral stent was placed on 8/21/2020. Of note, pus was noted to be draining from the right ureteral orifice consistent with pyohydronephrosis. The patient was since started on Zosyn.         1. Sepsis E. Coli acute Pyohydronephrosis/ acute right pyelonephritis secondary to obstructing right ureteral stone complicated by obstructive uropathy s/p ureteral stent on 8/21: CT scan of the abdomen/pelvis showed moderate right hydronephrosis with an obstructing 1.2 cm stone in the right proximal ureter. Transitioned from Zosyn to Ceftriaxone on 8/23/2020. Urine culture showing E. Coli. Blood cultures negative to date. Sepsis based on leukocytosis and tachycardia. Continue to monitor leukocytosis. Slight trend upwards noted today. Bladder scan obtained secondary to reports of abdominal fullness and unremarkable. 2. Hypotension/ Dehydration: IV fluids. Continue. Consider giving dose of Lasix  3. Acute kidney injury/ obstructive uropathy:  Cr up to 3.7. Secondary to pre-renal azotemia in setting of dehydration and obstructive uropathy. Improved to 1.2 at the time of discharge. There was no evidence for urinary retention on bladder scan. 4. Loose stools: Patient reported loose stools and abdominal distention today. Zosyn discontinued and replaced with Ceftriaxone. Add lactobacillus. KUB did not show any acute findings. Patient does have a history of IBS  5. Unspecified leukocytosis: WBC up to 22.3 with loose stools, possibly the beginnings of C diff? Patient advised to continue to monitor. CBC recommended in 1 week. OK with discharge as patient is afebrile and this may be from the procedure itself  6. History of breast cancer in remission s/p chemotherapy  7. 3.7 cm right adnexal cyst: check CA-125. Will need follow up with Ob/GYN as an outpatient  8. HTN  9. Fibromyalgia  10.  Chronic anxiety      Discharge Medications      Sundar Tami [address] Medication Instructions ADB:[id number]    Printed on:08/24/20 1225   Medication Information                      Alosetron HCl (LOTRONEX PO)  Take by mouth as needed             amoxicillin-clavulanate (AUGMENTIN) 875-125 MG per tablet  Take 1 tablet by mouth 2 times daily for 12 days Take at 9 am and 9 pm daily take with a meal             Chromium 200 MCG TABS  Take 1 tablet by mouth daily             citalopram (CELEXA) 10 MG tablet  Take 10 mg by mouth nightly             docusate sodium (COLACE) 100 MG capsule  Take 1 capsule by mouth daily as needed for Constipation             fenofibrate (TRICOR) 145 MG tablet  Take 145 mg by mouth daily. lisinopril (PRINIVIL;ZESTRIL) 10 MG tablet  Take 10 mg by mouth daily             melatonin 5 MG TABS tablet  Take 5 mg by mouth nightly             Multiple Vitamins-Minerals (THERAPEUTIC MULTIVITAMIN-MINERALS) tablet  Take 1 tablet by mouth daily. Nutritional Supplements (BOOST PO)  Take by mouth 2 times daily             Phosphatidylserine 100 MG CAPS  Take 1.5 capsules by mouth daily             polyethylene glycol (GLYCOLAX) 17 g packet  Take 17 g by mouth daily as needed for Constipation             Potassium 99 MG TABS  Take  by mouth daily. Probiotic Product (PROBIOTIC DAILY PO)  Take by mouth daily PROBIO 5             tamsulosin (FLOMAX) 0.4 MG capsule  Take 1 capsule by mouth daily             UNABLE TO FIND  Take 4 tablets by mouth daily.  mannotech ambrotose  For fibromyalgia                     Physical Examination     Vitals:  Vitals:    08/23/20 2100 08/24/20 0345 08/24/20 0900 08/24/20 1145   BP: 131/73 139/70 (!) 154/84 (!) 147/72   Pulse: 70 65 70 66   Resp: 18 16 16 16   Temp: 98.1 °F (36.7 °C) 97.8 °F (36.6 °C) 97.5 °F (36.4 °C) 97.6 °F (36.4 °C)   TempSrc: Oral Oral Oral Oral   SpO2: 97% 95% 99% 98%   Weight:  137 lb 1.6 oz (62.2 kg)     Height: 08/24/2020    K 3.7 08/24/2020     08/24/2020    CO2 17 08/24/2020    BUN 64 08/24/2020    CREATININE 1.2 08/24/2020    CREATININE 1.0 11/20/2013    CALCIUM 8.4 08/24/2020           Radiology      Xr Abdomen (kub) (single Ap View)    Result Date: 8/23/2020  PROCEDURE: XR ABDOMEN (KUB) (SINGLE AP VIEW) CLINICAL INFORMATION: abdominal fullness COMPARISON: 4/5/2016 TECHNIQUE: A single supine image of the abdomen was obtained. FINDINGS: No abnormally dilated bowel loops are seen. Appear to be a few renal calculi left kidney and a questionable small calculi right kidney. A double J stent is present on the right side in good position. There is a 2.3 cm calculus right upper quadrant, most  suggestive of gallstone. There are a few phleboliths in the pelvis. A few vascular clips are present in the pelvis from prior surgery. Renal calculi. Double-J stent right side. Apparent gallstone. No acute findings. **This report has been created using voice recognition software. It may contain minor errors which are inherent in voice recognition technology. ** Final report electronically signed by Dr. Lomax Leader on 8/23/2020 12:50 PM    Ct Comparison Of Outside Films    Result Date: 8/21/2020  Radiology exam is complete. No Radiologist dictation. Please follow up with ordering provider.          Consults     IP CONSULT TO UROLOGY          Discharge Instructions     Patient Instructions:    Discharge lab work: CBC, BMP in 1 week  Activity: As tolerated  Diet: DIET RENAL;      Follow-up visits     Biju Ortiz MD  [address] [place] [address]  Marc Russ MD  [address]  [phone]          Fred Garcia, [address] [place] [address] 46      F/u with Dr. Valentine Reich in the office in 2 weeks for UA and discuss scheduling definitive stone treatment    Dee Dee Herman MD  [address] [facility] [address]  [phone]    In 1 week  post-hospitalization  ureteral stent placement with persistent leukocytosis          Code status at time of discharge     Full Code       Time Spent on discharge is more than 48 minutes in the examination, evaluation, counseling and review of medications and discharge plan. Signed: Thank you Marisabel Gustafson MD for the opportunity to be involved in this patient's care.     Electronically signed by Donaldo Gaviria MD on 8/24/2020 at 12:25 PM

## 2020-08-24 NOTE — PROGRESS NOTES
Urology Progress Note    Chief Complaint: r flank pain  Reason for consultation:  Obstructing R proximal ureteral calculus and sepsis with RAGINI    Subjective: \"meagan never had this much fluid on me. \"    Pt resting in bed. Reports pain controlled. Voiding without difficulty. Bladder scan yesterday was 0. KUB showed stent in good position yesterday. Tolerating diet without n/v, decreased appetite. No chest pain or leg swelling. Pt notes some shortness of breath. She ambulated in halls yesterday. Reports diarrhea. Afebrile over night. Vitals:  /70   Pulse 65   Temp 97.8 °F (36.6 °C) (Oral)   Resp 16   Ht 5' 2\" (1.575 m)   Wt 137 lb 1.6 oz (62.2 kg)   SpO2 95%   BMI 25.08 kg/m²   Temp  Av.1 °F (36.7 °C)  Min: 97.8 °F (36.6 °C)  Max: 98.2 °F (36.8 °C)    Intake/Output Summary (Last 24 hours) at 2020 0845  Last data filed at 2020 6144  Gross per 24 hour   Intake 2552.63 ml   Output 1650 ml   Net 902.63 ml       Social History     Socioeconomic History    Marital status:      Spouse name: Not on file    Number of children: Not on file    Years of education: Not on file    Highest education level: Not on file   Occupational History    Not on file   Social Needs    Financial resource strain: Not on file    Food insecurity     Worry: Not on file     Inability: Not on file   Germin8 needs     Medical: Not on file     Non-medical: Not on file   Tobacco Use    Smoking status: Former Smoker     Packs/day: 0.50     Years: 6.00     Pack years: 3.00     Last attempt to quit: 1973     Years since quittin.1    Smokeless tobacco: Never Used   Substance and Sexual Activity    Alcohol use: Yes     Comment: OCC.     Drug use: No    Sexual activity: Not on file   Lifestyle    Physical activity     Days per week: Not on file     Minutes per session: Not on file    Stress: Not on file   Relationships    Social connections     Talks on phone: Not on file     Gets 08/24/2020    CO2 17 08/24/2020    BUN 64 08/24/2020    LABALBU 2.1 08/21/2020    CREATININE 1.2 08/24/2020    CREATININE 1.0 11/20/2013    CALCIUM 8.4 08/24/2020    LABGLOM 44 08/24/2020       Impression:  R proximal ureteral calculus 1.2 cm in size  Pyohydronephrosis  Acute pyelonephritis with sepsis  RAGINI, resolved  N/V resolved  Diarrhea  Anemia  Leukocytosis  L nonobstructive nephrolithiasis      Plan:    POD #3 cystoscopy and insertion of 6-Armenian multi-length Double J stent by Dr. Jerry Brenner 8/21/2020. Findings noted \"a large amount of purulent fluid was expelled from the right ureteral orifice, consistent with pyohydronephrosis. Pt will need definitive stone treatment outpatient in 2-3 weeks after resolution of infection. Recommend 2 weeks of antibiotic therapy at [place]. Schedule office visit in 2 weeks to check UA and discuss definitive stone treatment. Pt denies pain. Creatinine improving, 1.2 today. WBC up however pt is clinically improved and now afebrile. Urine culture with E coli sensitive to Rocephin, she is now currently on. Ambulate pt. And encouraged incentive spirometer. Antibiotics per Hospitalist.  KUB shows stent in good position. Discussed with Dr. Jerry Brenner.             RICHARD Wray  08/24/20 8:45 AM  Urology

## 2020-08-24 NOTE — PLAN OF CARE
Problem: Pain:  Goal: Pain level will decrease  Description: Pain level will decrease  8/24/2020 0055 by Lesli Li RN  Outcome: Met This Shift  Note: Ongoing assessment & interventions provided throughout shift. Reminded patient to report any pain to the nurse. Pain medications provided per physician's orders. Patient denies feelings of pain through the duration of the shift. Goal: Control of acute pain  Description: Control of acute pain  Outcome: Met This Shift  Goal: Control of chronic pain  Description: Control of chronic pain  Outcome: Met This Shift     Problem: Falls - Risk of:  Goal: Will remain free from falls  Description: Will remain free from falls  8/24/2020 0055 by Lesli Li RN  Outcome: Met This Shift  Note: Assessment & interventions provided throughout shift. Bed locked & in low position, call light in reach, side-rails up x2, bed/chair alarm utilized, non-slip socks on when ambulating, reminded patient to use call light to call for assistance. Continued hourly rounding. Goal: Absence of physical injury  Description: Absence of physical injury  Outcome: Met This Shift     Problem: Discharge Planning:  Goal: Participates in care planning  Description: Participates in care planning  8/24/2020 0055 by Lesli Li RN  Outcome: Met This Shift  Note: Plan of care explained to patient during bedside shift report. Patient verbalized understanding. Pt. Plans on being discharge 8/25 to home with . Goal: Discharged to appropriate level of care  Description: Discharged to appropriate level of care  Outcome: Met This Shift  Note: Patient is to be discharged home with  8/25. Patient comfortable with this plan.      Problem: Fluid Volume - Deficit:  Goal: Absence of fluid volume deficit signs and symptoms  Description: Absence of fluid volume deficit signs and symptoms  8/24/2020 0055 by Lesli Li RN  Outcome: Met This Shift  Note: Patients vitals and physical assessment suggest adequate fluid status. IV fluids running. Will continue to monitor. Problem: Skin Integrity - Impaired:  Goal: Will show no infection signs and symptoms  Description: Will show no infection signs and symptoms  8/24/2020 0055 by Sobeida Vaughn RN  Outcome: Met This Shift  Note: Ongoing assessment & interventions provided throughout shift. Skin assessments provided. Encouraging/assisting patient to turn as needed. Goal: Absence of new skin breakdown  Description: Absence of new skin breakdown  Outcome: Met This Shift  Note: Ongoing assessment & interventions provided throughout shift. Skin assessments provided with no evidence of new breakdown or redness. Encouraging/assisting patient to turn as needed. Will continue to monitor. Problem: Urinary Elimination:  Goal: Ability to recognize the need to void and respond appropriately will improve  Description: Ability to recognize the need to void and respond appropriately will improve  Outcome: Met This Shift  Note: No urinary incontinence this shift. Will continue to monitor. Problem: Fluid Volume - Deficit:  Goal: Electrolytes within specified parameters  Description: Electrolytes within specified parameters  Outcome: Ongoing  Note: Chloride mildly elevated. Remaining electrolytes WNL. Will continue to monitor. Problem: Urinary Elimination:  Goal: Ability to achieve a balanced intake and output will improve  Description: Ability to achieve a balanced intake and output will improve  Outcome: Ongoing  Note: Patient voiding regularly with adequate amounts each void. Will continue to monitor intake and output to assess for balance. Goal: Will remain free from infection  Description: Will remain free from infection  Outcome: Ongoing  Note: Patient positive for E. Coli in the urine. Will continue antibiotic regimen to treat infection.

## 2020-08-25 LAB
ORGANISM: ABNORMAL
URINE CULTURE, ROUTINE: ABNORMAL

## 2020-08-26 LAB — BLOOD CULTURE, ROUTINE: NORMAL

## 2020-08-28 ENCOUNTER — HOSPITAL ENCOUNTER (INPATIENT)
Age: 70
LOS: 3 days | Discharge: HOME | DRG: 698 | End: 2020-09-01
Attending: EMERGENCY MEDICINE | Admitting: INTERNAL MEDICINE
Payer: MEDICARE

## 2020-08-28 LAB
EKG ATRIAL RATE: 82 BPM
EKG P AXIS: 60 DEGREES
EKG P-R INTERVAL: 142 MS
EKG Q-T INTERVAL: 420 MS
EKG QRS DURATION: 78 MS
EKG QTC CALCULATION (BAZETT): 490 MS
EKG R AXIS: 44 DEGREES
EKG T AXIS: 96 DEGREES
EKG VENTRICULAR RATE: 82 BPM

## 2020-08-28 PROCEDURE — 93005 ELECTROCARDIOGRAM TRACING: CPT | Performed by: EMERGENCY MEDICINE

## 2020-08-28 PROCEDURE — 84145 PROCALCITONIN (PCT): CPT

## 2020-08-28 PROCEDURE — 99285 EMERGENCY DEPT VISIT HI MDM: CPT

## 2020-08-28 PROCEDURE — 80053 COMPREHEN METABOLIC PANEL: CPT

## 2020-08-28 PROCEDURE — 87040 BLOOD CULTURE FOR BACTERIA: CPT

## 2020-08-28 PROCEDURE — 96374 THER/PROPH/DIAG INJ IV PUSH: CPT

## 2020-08-28 PROCEDURE — 96376 TX/PRO/DX INJ SAME DRUG ADON: CPT

## 2020-08-28 PROCEDURE — 96375 TX/PRO/DX INJ NEW DRUG ADDON: CPT

## 2020-08-28 PROCEDURE — 83735 ASSAY OF MAGNESIUM: CPT

## 2020-08-28 PROCEDURE — 83690 ASSAY OF LIPASE: CPT

## 2020-08-28 PROCEDURE — 85025 COMPLETE CBC W/AUTO DIFF WBC: CPT

## 2020-08-28 PROCEDURE — 84484 ASSAY OF TROPONIN QUANT: CPT

## 2020-08-28 PROCEDURE — 82248 BILIRUBIN DIRECT: CPT

## 2020-08-28 PROCEDURE — 83605 ASSAY OF LACTIC ACID: CPT

## 2020-08-28 PROCEDURE — 36415 COLL VENOUS BLD VENIPUNCTURE: CPT

## 2020-08-28 ASSESSMENT — PAIN DESCRIPTION - DIRECTION: RADIATING_TOWARDS: BACK

## 2020-08-28 ASSESSMENT — PAIN DESCRIPTION - DESCRIPTORS: DESCRIPTORS: BURNING

## 2020-08-28 ASSESSMENT — PAIN DESCRIPTION - LOCATION: LOCATION: ABDOMEN

## 2020-08-28 ASSESSMENT — PAIN DESCRIPTION - ORIENTATION: ORIENTATION: RIGHT

## 2020-08-28 ASSESSMENT — PAIN SCALES - GENERAL: PAINLEVEL_OUTOF10: 6

## 2020-08-29 ENCOUNTER — APPOINTMENT (OUTPATIENT)
Dept: CT IMAGING | Age: 70
DRG: 698 | End: 2020-08-29
Payer: MEDICARE

## 2020-08-29 PROBLEM — N15.1 RENAL ABSCESS, RIGHT: Status: ACTIVE | Noted: 2020-08-29

## 2020-08-29 LAB
ABO: NORMAL
ALBUMIN SERPL-MCNC: 2.2 G/DL (ref 3.5–5.1)
ALBUMIN SERPL-MCNC: 2.7 G/DL (ref 3.5–5.1)
ALP BLD-CCNC: 55 U/L (ref 38–126)
ALP BLD-CCNC: 69 U/L (ref 38–126)
ALT SERPL-CCNC: 13 U/L (ref 11–66)
ALT SERPL-CCNC: 17 U/L (ref 11–66)
ANION GAP SERPL CALCULATED.3IONS-SCNC: 11 MEQ/L (ref 8–16)
ANION GAP SERPL CALCULATED.3IONS-SCNC: 7 MEQ/L (ref 8–16)
ANTIBODY SCREEN: NORMAL
AST SERPL-CCNC: 13 U/L (ref 5–40)
AST SERPL-CCNC: 16 U/L (ref 5–40)
BACTERIA: ABNORMAL /HPF
BASOPHILS # BLD: 0.3 %
BASOPHILS ABSOLUTE: 0 THOU/MM3 (ref 0–0.1)
BILIRUB SERPL-MCNC: 0.3 MG/DL (ref 0.3–1.2)
BILIRUB SERPL-MCNC: 0.3 MG/DL (ref 0.3–1.2)
BILIRUBIN DIRECT: < 0.2 MG/DL (ref 0–0.3)
BILIRUBIN URINE: NEGATIVE
BLOOD, URINE: ABNORMAL
BUN BLDV-MCNC: 11 MG/DL (ref 7–22)
BUN BLDV-MCNC: 13 MG/DL (ref 7–22)
CALCIUM IONIZED: 1.17 MMOL/L (ref 1.12–1.32)
CALCIUM SERPL-MCNC: 7.7 MG/DL (ref 8.5–10.5)
CALCIUM SERPL-MCNC: 8.3 MG/DL (ref 8.5–10.5)
CASTS 2: ABNORMAL /LPF
CASTS UA: ABNORMAL /LPF
CHARACTER, URINE: ABNORMAL
CHLORIDE BLD-SCNC: 107 MEQ/L (ref 98–111)
CHLORIDE BLD-SCNC: 111 MEQ/L (ref 98–111)
CO2: 20 MEQ/L (ref 23–33)
CO2: 21 MEQ/L (ref 23–33)
COLOR: YELLOW
CREAT SERPL-MCNC: 0.5 MG/DL (ref 0.4–1.2)
CREAT SERPL-MCNC: 0.6 MG/DL (ref 0.4–1.2)
CRYSTALS, UA: ABNORMAL
EOSINOPHIL # BLD: 0.8 %
EOSINOPHILS ABSOLUTE: 0.1 THOU/MM3 (ref 0–0.4)
EPITHELIAL CELLS, UA: ABNORMAL /HPF
ERYTHROCYTE [DISTWIDTH] IN BLOOD BY AUTOMATED COUNT: 15.1 % (ref 11.5–14.5)
ERYTHROCYTE [DISTWIDTH] IN BLOOD BY AUTOMATED COUNT: 15.1 % (ref 11.5–14.5)
ERYTHROCYTE [DISTWIDTH] IN BLOOD BY AUTOMATED COUNT: 50.4 FL (ref 35–45)
ERYTHROCYTE [DISTWIDTH] IN BLOOD BY AUTOMATED COUNT: 51.4 FL (ref 35–45)
FERRITIN: 399 NG/ML (ref 10–291)
GFR SERPL CREATININE-BSD FRML MDRD: > 90 ML/MIN/1.73M2
GFR SERPL CREATININE-BSD FRML MDRD: > 90 ML/MIN/1.73M2
GLUCOSE BLD-MCNC: 101 MG/DL (ref 70–108)
GLUCOSE BLD-MCNC: 125 MG/DL (ref 70–108)
GLUCOSE URINE: NEGATIVE MG/DL
HCT VFR BLD CALC: 25.8 % (ref 37–47)
HCT VFR BLD CALC: 27.5 % (ref 37–47)
HCT VFR BLD CALC: 32.5 % (ref 37–47)
HEMOGLOBIN: 10.4 GM/DL (ref 12–16)
HEMOGLOBIN: 8.2 GM/DL (ref 12–16)
HEMOGLOBIN: 9 GM/DL (ref 12–16)
IMMATURE GRANS (ABS): 0.13 THOU/MM3 (ref 0–0.07)
IMMATURE GRANULOCYTES: 0.9 %
IRON SATURATION: 8 % (ref 20–50)
IRON: 13 UG/DL (ref 50–170)
KETONES, URINE: 15
LACTIC ACID: 0.7 MMOL/L (ref 0.5–2.2)
LACTIC ACID: 0.7 MMOL/L (ref 0.5–2.2)
LEUKOCYTE ESTERASE, URINE: ABNORMAL
LIPASE: 27.6 U/L (ref 5.6–51.3)
LYMPHOCYTES # BLD: 9 %
LYMPHOCYTES ABSOLUTE: 1.3 THOU/MM3 (ref 1–4.8)
MAGNESIUM: 1.6 MG/DL (ref 1.6–2.4)
MAGNESIUM: 1.6 MG/DL (ref 1.6–2.4)
MCH RBC QN AUTO: 29.7 PG (ref 26–33)
MCH RBC QN AUTO: 30.3 PG (ref 26–33)
MCHC RBC AUTO-ENTMCNC: 31.8 GM/DL (ref 32.2–35.5)
MCHC RBC AUTO-ENTMCNC: 32.7 GM/DL (ref 32.2–35.5)
MCV RBC AUTO: 92.6 FL (ref 81–99)
MCV RBC AUTO: 93.5 FL (ref 81–99)
MISCELLANEOUS 2: ABNORMAL
MONOCYTES # BLD: 4.8 %
MONOCYTES ABSOLUTE: 0.7 THOU/MM3 (ref 0.4–1.3)
NITRITE, URINE: NEGATIVE
NUCLEATED RED BLOOD CELLS: 0 /100 WBC
OSMOLALITY CALCULATION: 275.9 MOSMOL/KG (ref 275–300)
PH UA: 5 (ref 5–9)
PHOSPHORUS: 2.1 MG/DL (ref 2.4–4.7)
PLATELET # BLD: 257 THOU/MM3 (ref 130–400)
PLATELET # BLD: 279 THOU/MM3 (ref 130–400)
PMV BLD AUTO: 11.6 FL (ref 9.4–12.4)
PMV BLD AUTO: 12.1 FL (ref 9.4–12.4)
POTASSIUM REFLEX MAGNESIUM: 3.7 MEQ/L (ref 3.5–5.2)
POTASSIUM SERPL-SCNC: 3.2 MEQ/L (ref 3.5–5.2)
PROCALCITONIN: 0.4 NG/ML (ref 0.01–0.09)
PROTEIN UA: ABNORMAL
RBC # BLD: 2.76 MILL/MM3 (ref 4.2–5.4)
RBC # BLD: 2.97 MILL/MM3 (ref 4.2–5.4)
RBC URINE: ABNORMAL /HPF
RENAL EPITHELIAL, UA: ABNORMAL
RH FACTOR: NORMAL
SARS-COV-2, NAAT: NOT DETECTED
SEG NEUTROPHILS: 84.2 %
SEGMENTED NEUTROPHILS ABSOLUTE COUNT: 12 THOU/MM3 (ref 1.8–7.7)
SODIUM BLD-SCNC: 138 MEQ/L (ref 135–145)
SODIUM BLD-SCNC: 139 MEQ/L (ref 135–145)
SPECIFIC GRAVITY, URINE: 1.01 (ref 1–1.03)
TOTAL IRON BINDING CAPACITY: 163 UG/DL (ref 171–450)
TOTAL PROTEIN: 4.6 G/DL (ref 6.1–8)
TOTAL PROTEIN: 5.5 G/DL (ref 6.1–8)
TROPONIN T: 0.02 NG/ML
TROPONIN T: 0.02 NG/ML
UROBILINOGEN, URINE: 0.2 EU/DL (ref 0–1)
WBC # BLD: 12.6 THOU/MM3 (ref 4.8–10.8)
WBC # BLD: 14.3 THOU/MM3 (ref 4.8–10.8)
WBC UA: ABNORMAL /HPF
YEAST: ABNORMAL

## 2020-08-29 PROCEDURE — 6360000002 HC RX W HCPCS: Performed by: EMERGENCY MEDICINE

## 2020-08-29 PROCEDURE — 6360000004 HC RX CONTRAST MEDICATION: Performed by: EMERGENCY MEDICINE

## 2020-08-29 PROCEDURE — 83550 IRON BINDING TEST: CPT

## 2020-08-29 PROCEDURE — 7100000000 HC PACU RECOVERY - FIRST 15 MIN: Performed by: INTERNAL MEDICINE

## 2020-08-29 PROCEDURE — 6370000000 HC RX 637 (ALT 250 FOR IP): Performed by: EMERGENCY MEDICINE

## 2020-08-29 PROCEDURE — 36430 TRANSFUSION BLD/BLD COMPNT: CPT

## 2020-08-29 PROCEDURE — P9016 RBC LEUKOCYTES REDUCED: HCPCS

## 2020-08-29 PROCEDURE — U0002 COVID-19 LAB TEST NON-CDC: HCPCS

## 2020-08-29 PROCEDURE — 83605 ASSAY OF LACTIC ACID: CPT

## 2020-08-29 PROCEDURE — 82728 ASSAY OF FERRITIN: CPT

## 2020-08-29 PROCEDURE — 85014 HEMATOCRIT: CPT

## 2020-08-29 PROCEDURE — 6360000002 HC RX W HCPCS: Performed by: STUDENT IN AN ORGANIZED HEALTH CARE EDUCATION/TRAINING PROGRAM

## 2020-08-29 PROCEDURE — 87086 URINE CULTURE/COLONY COUNT: CPT

## 2020-08-29 PROCEDURE — 83540 ASSAY OF IRON: CPT

## 2020-08-29 PROCEDURE — 82330 ASSAY OF CALCIUM: CPT

## 2020-08-29 PROCEDURE — 81001 URINALYSIS AUTO W/SCOPE: CPT

## 2020-08-29 PROCEDURE — 99221 1ST HOSP IP/OBS SF/LOW 40: CPT | Performed by: UROLOGY

## 2020-08-29 PROCEDURE — 7100000001 HC PACU RECOVERY - ADDTL 15 MIN: Performed by: INTERNAL MEDICINE

## 2020-08-29 PROCEDURE — 2709999900 HC NON-CHARGEABLE SUPPLY: Performed by: INTERNAL MEDICINE

## 2020-08-29 PROCEDURE — 84100 ASSAY OF PHOSPHORUS: CPT

## 2020-08-29 PROCEDURE — 86901 BLOOD TYPING SEROLOGIC RH(D): CPT

## 2020-08-29 PROCEDURE — 2580000003 HC RX 258: Performed by: EMERGENCY MEDICINE

## 2020-08-29 PROCEDURE — 36415 COLL VENOUS BLD VENIPUNCTURE: CPT

## 2020-08-29 PROCEDURE — 84484 ASSAY OF TROPONIN QUANT: CPT

## 2020-08-29 PROCEDURE — 6370000000 HC RX 637 (ALT 250 FOR IP): Performed by: STUDENT IN AN ORGANIZED HEALTH CARE EDUCATION/TRAINING PROGRAM

## 2020-08-29 PROCEDURE — 88305 TISSUE EXAM BY PATHOLOGIST: CPT

## 2020-08-29 PROCEDURE — 86923 COMPATIBILITY TEST ELECTRIC: CPT

## 2020-08-29 PROCEDURE — 99223 1ST HOSP IP/OBS HIGH 75: CPT | Performed by: INTERNAL MEDICINE

## 2020-08-29 PROCEDURE — 1200000003 HC TELEMETRY R&B

## 2020-08-29 PROCEDURE — 85018 HEMOGLOBIN: CPT

## 2020-08-29 PROCEDURE — 86900 BLOOD TYPING SEROLOGIC ABO: CPT

## 2020-08-29 PROCEDURE — 2580000003 HC RX 258: Performed by: STUDENT IN AN ORGANIZED HEALTH CARE EDUCATION/TRAINING PROGRAM

## 2020-08-29 PROCEDURE — 80053 COMPREHEN METABOLIC PANEL: CPT

## 2020-08-29 PROCEDURE — C9113 INJ PANTOPRAZOLE SODIUM, VIA: HCPCS | Performed by: STUDENT IN AN ORGANIZED HEALTH CARE EDUCATION/TRAINING PROGRAM

## 2020-08-29 PROCEDURE — 83735 ASSAY OF MAGNESIUM: CPT

## 2020-08-29 PROCEDURE — 3609012400 HC EGD TRANSORAL BIOPSY SINGLE/MULTIPLE: Performed by: INTERNAL MEDICINE

## 2020-08-29 PROCEDURE — 85027 COMPLETE CBC AUTOMATED: CPT

## 2020-08-29 PROCEDURE — 6360000002 HC RX W HCPCS: Performed by: INTERNAL MEDICINE

## 2020-08-29 PROCEDURE — 86850 RBC ANTIBODY SCREEN: CPT

## 2020-08-29 PROCEDURE — 74177 CT ABD & PELVIS W/CONTRAST: CPT

## 2020-08-29 PROCEDURE — P9040 RBC LEUKOREDUCED IRRADIATED: HCPCS

## 2020-08-29 PROCEDURE — 0DB68ZX EXCISION OF STOMACH, VIA NATURAL OR ARTIFICIAL OPENING ENDOSCOPIC, DIAGNOSTIC: ICD-10-PCS | Performed by: INTERNAL MEDICINE

## 2020-08-29 RX ORDER — CHROMIUM 200 MCG
1 TABLET ORAL DAILY
Status: DISCONTINUED | OUTPATIENT
Start: 2020-08-29 | End: 2020-08-29 | Stop reason: RX

## 2020-08-29 RX ORDER — FENTANYL CITRATE 50 UG/ML
INJECTION, SOLUTION INTRAMUSCULAR; INTRAVENOUS
Status: DISPENSED
Start: 2020-08-29 | End: 2020-08-30

## 2020-08-29 RX ORDER — PANTOPRAZOLE SODIUM 40 MG/10ML
40 INJECTION, POWDER, LYOPHILIZED, FOR SOLUTION INTRAVENOUS DAILY
Status: DISCONTINUED | OUTPATIENT
Start: 2020-08-29 | End: 2020-08-30

## 2020-08-29 RX ORDER — SODIUM CHLORIDE 0.9 % (FLUSH) 0.9 %
10 SYRINGE (ML) INJECTION EVERY 12 HOURS SCHEDULED
Status: DISCONTINUED | OUTPATIENT
Start: 2020-08-29 | End: 2020-09-01 | Stop reason: HOSPADM

## 2020-08-29 RX ORDER — MAGNESIUM SULFATE IN WATER 40 MG/ML
2 INJECTION, SOLUTION INTRAVENOUS ONCE
Status: COMPLETED | OUTPATIENT
Start: 2020-08-29 | End: 2020-08-29

## 2020-08-29 RX ORDER — LACTOBACILLUS RHAMNOSUS GG 10B CELL
1 CAPSULE ORAL DAILY
Status: DISCONTINUED | OUTPATIENT
Start: 2020-08-29 | End: 2020-09-01 | Stop reason: HOSPADM

## 2020-08-29 RX ORDER — POTASSIUM CHLORIDE 750 MG/1
40 TABLET, FILM COATED, EXTENDED RELEASE ORAL ONCE
Status: COMPLETED | OUTPATIENT
Start: 2020-08-29 | End: 2020-08-29

## 2020-08-29 RX ORDER — MORPHINE SULFATE 2 MG/ML
2 INJECTION, SOLUTION INTRAMUSCULAR; INTRAVENOUS ONCE
Status: COMPLETED | OUTPATIENT
Start: 2020-08-29 | End: 2020-08-29

## 2020-08-29 RX ORDER — POLYETHYLENE GLYCOL 3350 17 G/17G
17 POWDER, FOR SOLUTION ORAL DAILY PRN
Status: DISCONTINUED | OUTPATIENT
Start: 2020-08-29 | End: 2020-09-01 | Stop reason: HOSPADM

## 2020-08-29 RX ORDER — ACETAMINOPHEN 650 MG/1
650 SUPPOSITORY RECTAL EVERY 6 HOURS PRN
Status: DISCONTINUED | OUTPATIENT
Start: 2020-08-29 | End: 2020-09-01 | Stop reason: HOSPADM

## 2020-08-29 RX ORDER — TAMSULOSIN HYDROCHLORIDE 0.4 MG/1
0.4 CAPSULE ORAL DAILY
Status: DISCONTINUED | OUTPATIENT
Start: 2020-08-29 | End: 2020-09-01 | Stop reason: HOSPADM

## 2020-08-29 RX ORDER — CITALOPRAM 20 MG/1
10 TABLET ORAL NIGHTLY
Status: DISCONTINUED | OUTPATIENT
Start: 2020-08-29 | End: 2020-09-01 | Stop reason: HOSPADM

## 2020-08-29 RX ORDER — FENOFIBRATE 160 MG/1
160 TABLET ORAL DAILY
Status: DISCONTINUED | OUTPATIENT
Start: 2020-08-29 | End: 2020-08-29

## 2020-08-29 RX ORDER — M-VIT,TX,IRON,MINS/CALC/FOLIC 27MG-0.4MG
1 TABLET ORAL DAILY
Status: DISCONTINUED | OUTPATIENT
Start: 2020-08-29 | End: 2020-09-01 | Stop reason: HOSPADM

## 2020-08-29 RX ORDER — FERROUS SULFATE 325(65) MG
325 TABLET ORAL 2 TIMES DAILY WITH MEALS
Status: DISCONTINUED | OUTPATIENT
Start: 2020-08-29 | End: 2020-09-01 | Stop reason: HOSPADM

## 2020-08-29 RX ORDER — LANOLIN ALCOHOL/MO/W.PET/CERES
5 CREAM (GRAM) TOPICAL NIGHTLY
Status: DISCONTINUED | OUTPATIENT
Start: 2020-08-29 | End: 2020-09-01 | Stop reason: HOSPADM

## 2020-08-29 RX ORDER — MIDAZOLAM HYDROCHLORIDE 1 MG/ML
INJECTION INTRAMUSCULAR; INTRAVENOUS
Status: DISPENSED
Start: 2020-08-29 | End: 2020-08-30

## 2020-08-29 RX ORDER — MIDAZOLAM HYDROCHLORIDE 1 MG/ML
INJECTION INTRAMUSCULAR; INTRAVENOUS PRN
Status: DISCONTINUED | OUTPATIENT
Start: 2020-08-29 | End: 2020-08-29 | Stop reason: ALTCHOICE

## 2020-08-29 RX ORDER — HYDROCODONE BITARTRATE AND ACETAMINOPHEN 5; 325 MG/1; MG/1
1 TABLET ORAL EVERY 6 HOURS PRN
Status: DISCONTINUED | OUTPATIENT
Start: 2020-08-29 | End: 2020-09-01 | Stop reason: HOSPADM

## 2020-08-29 RX ORDER — SODIUM CHLORIDE 9 MG/ML
INJECTION, SOLUTION INTRAVENOUS CONTINUOUS
Status: DISCONTINUED | OUTPATIENT
Start: 2020-08-29 | End: 2020-08-30

## 2020-08-29 RX ORDER — ONDANSETRON 2 MG/ML
4 INJECTION INTRAMUSCULAR; INTRAVENOUS ONCE
Status: COMPLETED | OUTPATIENT
Start: 2020-08-29 | End: 2020-08-29

## 2020-08-29 RX ORDER — 0.9 % SODIUM CHLORIDE 0.9 %
20 INTRAVENOUS SOLUTION INTRAVENOUS ONCE
Status: COMPLETED | OUTPATIENT
Start: 2020-08-29 | End: 2020-08-29

## 2020-08-29 RX ORDER — ACETAMINOPHEN 325 MG/1
650 TABLET ORAL EVERY 6 HOURS PRN
Status: DISCONTINUED | OUTPATIENT
Start: 2020-08-29 | End: 2020-09-01 | Stop reason: HOSPADM

## 2020-08-29 RX ORDER — MORPHINE SULFATE 2 MG/ML
2 INJECTION, SOLUTION INTRAMUSCULAR; INTRAVENOUS EVERY 4 HOURS PRN
Status: DISCONTINUED | OUTPATIENT
Start: 2020-08-29 | End: 2020-09-01 | Stop reason: HOSPADM

## 2020-08-29 RX ORDER — FENTANYL CITRATE 50 UG/ML
INJECTION, SOLUTION INTRAMUSCULAR; INTRAVENOUS PRN
Status: DISCONTINUED | OUTPATIENT
Start: 2020-08-29 | End: 2020-08-29 | Stop reason: ALTCHOICE

## 2020-08-29 RX ORDER — LISINOPRIL 10 MG/1
10 TABLET ORAL DAILY
Status: DISCONTINUED | OUTPATIENT
Start: 2020-08-29 | End: 2020-09-01 | Stop reason: HOSPADM

## 2020-08-29 RX ORDER — SODIUM CHLORIDE 0.9 % (FLUSH) 0.9 %
10 SYRINGE (ML) INJECTION PRN
Status: DISCONTINUED | OUTPATIENT
Start: 2020-08-29 | End: 2020-09-01 | Stop reason: HOSPADM

## 2020-08-29 RX ORDER — DOCUSATE SODIUM 100 MG/1
100 CAPSULE, LIQUID FILLED ORAL DAILY PRN
Status: DISCONTINUED | OUTPATIENT
Start: 2020-08-29 | End: 2020-09-01 | Stop reason: HOSPADM

## 2020-08-29 RX ORDER — 0.9 % SODIUM CHLORIDE 0.9 %
1000 INTRAVENOUS SOLUTION INTRAVENOUS ONCE
Status: COMPLETED | OUTPATIENT
Start: 2020-08-29 | End: 2020-08-29

## 2020-08-29 RX ADMIN — CITALOPRAM 10 MG: 20 TABLET, FILM COATED ORAL at 21:32

## 2020-08-29 RX ADMIN — MORPHINE SULFATE 2 MG: 2 INJECTION, SOLUTION INTRAMUSCULAR; INTRAVENOUS at 02:26

## 2020-08-29 RX ADMIN — VANCOMYCIN HYDROCHLORIDE 1000 MG: 1 INJECTION, POWDER, LYOPHILIZED, FOR SOLUTION INTRAVENOUS at 04:27

## 2020-08-29 RX ADMIN — FERROUS SULFATE TAB 325 MG (65 MG ELEMENTAL FE) 325 MG: 325 (65 FE) TAB at 13:23

## 2020-08-29 RX ADMIN — IOPAMIDOL 80 ML: 755 INJECTION, SOLUTION INTRAVENOUS at 02:10

## 2020-08-29 RX ADMIN — Medication 4.5 MG: at 19:58

## 2020-08-29 RX ADMIN — ACETAMINOPHEN 650 MG: 325 TABLET ORAL at 04:53

## 2020-08-29 RX ADMIN — SODIUM CHLORIDE: 9 INJECTION, SOLUTION INTRAVENOUS at 04:20

## 2020-08-29 RX ADMIN — PANTOPRAZOLE SODIUM 40 MG: 40 INJECTION, POWDER, FOR SOLUTION INTRAVENOUS at 13:23

## 2020-08-29 RX ADMIN — SODIUM CHLORIDE 20 ML: 9 INJECTION, SOLUTION INTRAVENOUS at 14:50

## 2020-08-29 RX ADMIN — ACETAMINOPHEN 650 MG: 325 TABLET ORAL at 15:03

## 2020-08-29 RX ADMIN — CEFTRIAXONE SODIUM 1 G: 1 INJECTION, POWDER, FOR SOLUTION INTRAMUSCULAR; INTRAVENOUS at 09:24

## 2020-08-29 RX ADMIN — FERROUS SULFATE TAB 325 MG (65 MG ELEMENTAL FE) 325 MG: 325 (65 FE) TAB at 17:23

## 2020-08-29 RX ADMIN — MAGNESIUM SULFATE HEPTAHYDRATE 2 G: 40 INJECTION, SOLUTION INTRAVENOUS at 05:47

## 2020-08-29 RX ADMIN — ONDANSETRON 4 MG: 2 INJECTION INTRAMUSCULAR; INTRAVENOUS at 00:26

## 2020-08-29 RX ADMIN — PIPERACILLIN AND TAZOBACTAM 3.38 G: 3; .375 INJECTION, POWDER, LYOPHILIZED, FOR SOLUTION INTRAVENOUS at 03:19

## 2020-08-29 RX ADMIN — MORPHINE SULFATE 2 MG: 2 INJECTION, SOLUTION INTRAMUSCULAR; INTRAVENOUS at 00:26

## 2020-08-29 RX ADMIN — SODIUM CHLORIDE: 9 INJECTION, SOLUTION INTRAVENOUS at 20:01

## 2020-08-29 RX ADMIN — MULTIPLE VITAMINS W/ MINERALS TAB 1 TABLET: TAB at 09:24

## 2020-08-29 RX ADMIN — POTASSIUM CHLORIDE 40 MEQ: 750 TABLET, FILM COATED, EXTENDED RELEASE ORAL at 00:43

## 2020-08-29 RX ADMIN — SODIUM CHLORIDE 1000 ML: 9 INJECTION, SOLUTION INTRAVENOUS at 00:26

## 2020-08-29 RX ADMIN — TAMSULOSIN HYDROCHLORIDE 0.4 MG: 0.4 CAPSULE ORAL at 09:24

## 2020-08-29 RX ADMIN — LISINOPRIL 10 MG: 10 TABLET ORAL at 18:17

## 2020-08-29 RX ADMIN — Medication 1 CAPSULE: at 13:23

## 2020-08-29 ASSESSMENT — PAIN DESCRIPTION - DESCRIPTORS
DESCRIPTORS: BURNING

## 2020-08-29 ASSESSMENT — PAIN SCALES - WONG BAKER: WONGBAKER_NUMERICALRESPONSE: 0

## 2020-08-29 ASSESSMENT — PAIN SCALES - GENERAL
PAINLEVEL_OUTOF10: 8
PAINLEVEL_OUTOF10: 0
PAINLEVEL_OUTOF10: 3
PAINLEVEL_OUTOF10: 8
PAINLEVEL_OUTOF10: 3
PAINLEVEL_OUTOF10: 2
PAINLEVEL_OUTOF10: 8
PAINLEVEL_OUTOF10: 3

## 2020-08-29 ASSESSMENT — ENCOUNTER SYMPTOMS
DIARRHEA: 0
ABDOMINAL DISTENTION: 0
ABDOMINAL PAIN: 0
COUGH: 0
PHOTOPHOBIA: 0
VOMITING: 0
EYE ITCHING: 0
NAUSEA: 0
RHINORRHEA: 0
CHEST TIGHTNESS: 0
EYE REDNESS: 0
STRIDOR: 0
EYE PAIN: 0
CONSTIPATION: 0
SORE THROAT: 0
EYE DISCHARGE: 0
WHEEZING: 0
BACK PAIN: 0
SHORTNESS OF BREATH: 0

## 2020-08-29 ASSESSMENT — PAIN DESCRIPTION - FREQUENCY
FREQUENCY: CONTINUOUS

## 2020-08-29 ASSESSMENT — PAIN DESCRIPTION - LOCATION
LOCATION: ABDOMEN;BACK
LOCATION: ABDOMEN;CHEST
LOCATION: ABDOMEN;BACK

## 2020-08-29 ASSESSMENT — PAIN DESCRIPTION - PROGRESSION
CLINICAL_PROGRESSION: GRADUALLY WORSENING
CLINICAL_PROGRESSION: NOT CHANGED
CLINICAL_PROGRESSION: NOT CHANGED

## 2020-08-29 ASSESSMENT — PAIN DESCRIPTION - ORIENTATION
ORIENTATION: RIGHT
ORIENTATION: RIGHT

## 2020-08-29 ASSESSMENT — PAIN DESCRIPTION - PAIN TYPE
TYPE: ACUTE PAIN

## 2020-08-29 ASSESSMENT — PAIN DESCRIPTION - ONSET
ONSET: ON-GOING

## 2020-08-29 NOTE — H&P
[address] [address]  Sedation/Analgesia History & Physical    Patient: Ludmila Chauhan: 1950  Med Rec#: [id number] Acc#: [id number]   Provider Performing Procedure: Kaye Guzman MD  Primary Care Physician: Governor Karl MD    PRE-PROCEDURE   Brief History/Pre-Procedure Diagnosis:The patient is a 68 y.o.,  female with significant past medical history of melena. MEDICAL HISTORY  []CAD/Valve  []Liver Disease  []Lung Disease []Diabetes  []Hypertension []Renal Disease  [x]Additional information:       has a past medical history of Anxiety, Breast cancer ([facility] [address] Fibromyalgia, Hyperlipidemia, Hypertension, IBS (irritable bowel syndrome), Kidney stones, Nausea & vomiting, and Prolonged emergence from general anesthesia. SURGICAL HISTORY   has a past surgical history that includes  section (); other surgical history; Dilation and curettage of uterus; Breast lumpectomy (); Appendectomy (); Hysterectomy (); Colonoscopy (); Ureteroscopy; Lithotripsy; cystourethroscopy (Left, 2013); Cystoscopy (16); and Cystoscopy (Right, 2020).   Additional information:       ALLERGIES   Allergies as of 2020 - Review Complete 2020   Allergen Reaction Noted    Asa [aspirin] Other (See Comments) 2013     Additional information:       MEDICATIONS       Current Facility-Administered Medications:     citalopram (CELEXA) tablet 10 mg, 10 mg, Oral, Nightly, Chastity Guerrero DO    docusate sodium (COLACE) capsule 100 mg, 100 mg, Oral, Daily PRN, Chastity Guerrero DO    lisinopril (PRINIVIL;ZESTRIL) tablet 10 mg, 10 mg, Oral, Daily, Chastity Guerrero DO    melatonin tablet 4.5 mg, 4.5 mg, Oral, Nightly, Chastity Guerrero DO    therapeutic multivitamin-minerals 1 tablet, 1 tablet, Oral, Daily, Chastity Guerrero DO, 1 tablet at 20 0924    polyethylene glycol (GLYCOLAX) packet 17 g, 17 g, Oral, Daily PRN, Chastity Guerrero DO    lactobacillus (CULTURELLE) capsule 1 capsule, 1 capsule, Oral, Daily, Chastity Guerrero DO, 1 capsule at 08/29/20 1323    sodium chloride flush 0.9 % injection 10 mL, 10 mL, Intravenous, 2 times per day, Chastity Guerrero DO    sodium chloride flush 0.9 % injection 10 mL, 10 mL, Intravenous, PRN, Chastity Guerrero DO    acetaminophen (TYLENOL) tablet 650 mg, 650 mg, Oral, Q6H PRN, 650 mg at 08/29/20 1503 **OR** acetaminophen (TYLENOL) suppository 650 mg, 650 mg, Rectal, Q6H PRN, Chastity Guerrero DO    [Held by provider] enoxaparin (LOVENOX) injection 40 mg, 40 mg, Subcutaneous, Daily, Chastity Guerrero DO    0.9 % sodium chloride infusion, , Intravenous, Continuous, Chastity Guerrero DO, Last Rate: 125 mL/hr at 08/29/20 0420    tamsulosin (FLOMAX) capsule 0.4 mg, 0.4 mg, Oral, Daily, Chastity Guerrero DO, 0.4 mg at 08/29/20 0924    morphine (PF) injection 2 mg, 2 mg, Intravenous, Q4H PRN, Chastity Guerrero DO    cefTRIAXone (ROCEPHIN) 1 g IVPB in 50 mL D5W minibag, 1 g, Intravenous, Q24H, Chastity Guerrero DO, Stopped at 08/29/20 0954    potassium replacement protocol, , Other, RX Placeholder, Chastity Guerrero DO    HYDROcodone-acetaminophen (NORCO) 5-325 MG per tablet 1 tablet, 1 tablet, Oral, Q6H PRN, Chastity Guerrero DO    0.9 % sodium chloride bolus, 20 mL, Intravenous, Once, Sree Carroll MD, Last Rate: 10 mL/hr at 08/29/20 1450, 20 mL at 08/29/20 1450    pantoprazole (PROTONIX) injection 40 mg, 40 mg, Intravenous, Daily, Sree Carroll MD, 40 mg at 08/29/20 1323    ferrous sulfate (IRON 325) tablet 325 mg, 325 mg, Oral, BID WC, Sree Carroll MD, 325 mg at 08/29/20 1323    fentaNYL (SUBLIMAZE) 100 MCG/2ML injection, , , ,     midazolam (VERSED) 5 MG/5ML injection, , , ,   Prior to Admission medications    Medication Sig Start Date End Date Taking?  Authorizing Provider   tamsulosin (FLOMAX) 0.4 MG capsule Take 1 capsule by mouth daily 8/25/20  Yes Lynn Pham MD   amoxicillin-clavulanate (AUGMENTIN) 875-125 MG per tablet Take 1 tablet by mouth 2 times daily for 12 days Take at 9 am and 9 pm daily take with a meal 8/25/20 9/6/20 Yes Brandee Treviño MD   citalopram (CELEXA) 10 MG tablet Take 10 mg by mouth nightly   Yes Historical Provider, MD   lisinopril (PRINIVIL;ZESTRIL) 10 MG tablet Take 10 mg by mouth daily   Yes Historical Provider, MD   melatonin 5 MG TABS tablet Take 5 mg by mouth nightly   Yes Historical Provider, MD   Probiotic Product (PROBIOTIC DAILY PO) Take by mouth daily PROBIO 5   Yes Historical Provider, MD   Multiple Vitamins-Minerals (THERAPEUTIC MULTIVITAMIN-MINERALS) tablet Take 1 tablet by mouth daily. Yes Historical Provider, MD   polyethylene glycol (GLYCOLAX) 17 g packet Take 17 g by mouth daily as needed for Constipation 8/24/20 9/23/20  Brandee rTeviño MD   docusate sodium (COLACE) 100 MG capsule Take 1 capsule by mouth daily as needed for Constipation 4/27/16   Mynor Lares MD   UNABLE TO FIND Take 4 tablets by mouth daily. mannotech ambrotose  For fibromyalgia    Historical Provider, MD     Additional information:       PHYSICAL:    weight is 136 lb 14.4 oz (62.1 kg). Her oral temperature is 99.2 °F (37.3 °C). Her blood pressure is 153/72 (abnormal) and her pulse is 80. Her respiration is 16 and oxygen saturation is 98%.    Heart:  [x]Regular rate and rhythm  []Other:    Lungs:  [x]Clear    []Other:    Abdomen: [x]Soft    []Other:    Mental Status: [x]Alert & Oriented  []Other:      VITAL SIGNS   Patient Vitals for the past 24 hrs:   BP Temp Temp src Pulse Resp SpO2 Weight   08/29/20 1544 (!) 153/72 -- -- 80 16 98 % --   08/29/20 1500 137/64 99.2 °F (37.3 °C) Oral 82 16 98 % --   08/29/20 1440 136/83 98.8 °F (37.1 °C) -- 82 16 -- --   08/29/20 1054 118/61 98.6 °F (37 °C) Oral 74 18 96 % --   08/29/20 0913 115/64 98.6 °F (37 °C) Oral 80 16 98 % --   08/29/20 0400 136/84 98.2 °F (36.8 °C) Oral 70 17 100 % 136 lb 14.4 oz (62.1 kg)   08/29/20 0230 137/79 -- -- 88 18 99 % --   08/29/20 0120 (!) 140/79 -- -- 94 16 99 % --   08/29/20 0018 (!) 146/80 -- -- 85 17 98 % -- 08/28/20 2324 (!) 140/74 98.7 °F (37.1 °C) Oral 78 18 98 % 134 lb (60.8 kg)       PLANNED PROCEDURE   [x]EGD  []Colonoscopy []Flex Sigmoid  []ERCP []EUS   []Cystoscopy  [] CATH [] BRONCH   Consent: I have discussed with the patient and/or the patient representative the indication, alternatives, and the possible risks and/or complications of the planned procedure and the anesthesia methods. The patient and/or patient representative appear to understand and agree to proceed. SEDATION (SEE ANESTHESIA NOTE FOR DETAILS)    ASA Classification: Class 2 - A normal healthy patient with mild systemic disease    Airway Assessment: normal    Monitoring and Safety: The patient will be placed on a cardiac monitor and vital signs, pulse oximetry and level of consciousness will be continuously evaluated throughout the procedure. The patient will be closely monitored until recovery from the medications is complete and the patient has returned to baseline status. Respiratory therapy will be on standby during the procedure. [x]Pre-procedure diagnostic studies complete and results available. Comment:    [x]Previous sedation/anesthesia experiences assessed. Comment:    [x]The patient is an appropriate candidate to undergo the planned procedure sedation and anesthesia. (Refer to nursing sedation/analgesia documentation record)  [x]Formulation and discussion of sedation/procedure plan, risks, and expectations with patient and/or responsible adult completed. [x]Patient examined immediately prior to the procedure.  (Refer to nursing sedation/analgesia documentation record)    Candy Jacobo MD   Electronically signed 8/29/2020 at 4:04 PM

## 2020-08-29 NOTE — CONSULTS
[address] [facility]LE[facility] [address]  [address] [facility] [address]  Dept: [phone]  Loc: [phone]  Visit Date: 8/28/2020    Urology Consult Note    Reason for Consult: Renal abscess  Requesting Physician: Inpatient medicine service  ______________________________________________    Urologic Impression: Renal abscesses following placement of a stent for obstructing stone. Several small foci with no dominant foci minimal to percutaneous drainage. Patient not showing signs of sepsis. Urologic Plans / Recommendations:  Conservative therapy with antibiotics recommended.    ______________________________________________    History Obtained From:  patient    Chief Complaint: Abdominal pain and low-grade fever    HISTORY OF PRESENT ILLNESS:                The patient is a 68 y.o. female admitted for fever abdominal pain status post right stent placement and has the following urologic problem: Abscess seen on CT scan. Patient had stent placed last week for obstructing ureteral calculus. Plan was for future ESWL. Patient developed fever and abdominal pain and this presented to the emergency room. CT scan was ordered and showed several foci of abscesses. Patient has been afebrile and hemodynamically stable since admission. Currently on. Rocephin.     Past Medical History:        Diagnosis Date    Anxiety     Breast cancer ([address]     Fibromyalgia     Hyperlipidemia     Hypertension     IBS (irritable bowel syndrome)     Kidney stones     Nausea & vomiting     Prolonged emergence from general anesthesia      Past Surgical History:        Procedure Laterality Date    APPENDECTOMY  1958    BREAST [facility]    COLONOSCOPY  2007    CYSTOSCOPY  4/27/16    Right Ureteroscopy Laser Lithotripsy Basket Retrieval of Stone Fragments Right Ureteral Stent Placement, Left Ureteroscopy Basket Retrieval of Stone Left Ureteral Stent Placement - Dr. Catrachita Campuzano Right 2020    CYSTOSCOPY, RIGHT  URETERAL STENT INSERTION performed by Consuelo Miguel MD at [facility] Left 2013    HOLMIUM LASER LITHOTRIPSY, BASKET EXTRACTION  STENT INSERTION     DILATION AND CURETTAGE OF UTERUS      x3    HYSTERECTOMY  1997    LITHOTRIPSY      OTHER SURGICAL HISTORY      samuel?  URETEROSCOPY       Allergies:  Asa [aspirin]  Social History:  Social History     Socioeconomic History    Marital status:      Spouse name: Not on file    Number of children: Not on file    Years of education: Not on file    Highest education level: Not on file   Occupational History    Not on file   Social Needs    Financial resource strain: Not on file    Food insecurity     Worry: Not on file     Inability: Not on file   [employer] needs     Medical: Not on file     Non-medical: Not on file   Tobacco Use    Smoking status: Former Smoker     Packs/day: 0.50     Years: 6.00     Pack years: 3.00     Last attempt to quit: 1973     Years since quittin.1    Smokeless tobacco: Never Used   Substance and Sexual Activity    Alcohol use: Yes     Comment: OCC.     Drug use: No    Sexual activity: Not on file   Lifestyle    Physical activity     Days per week: Not on file     Minutes per session: Not on file    Stress: Not on file   Relationships    Social connections     Talks on phone: Not on file     Gets together: Not on file     Attends Sabianism service: Not on file     Active member of club or organization: Not on file     Attends meetings of clubs or organizations: Not on file     Relationship status: Not on file    Intimate partner violence     Fear of current or ex partner: Not on file     Emotionally abused: Not on file     Physically abused: Not on file     Forced sexual activity: Not on file   Other Topics Concern    Not on file   Social History Narrative    Not on file     Family History: 25-50W/CLUMPS 08/29/2020    RBCUA 5-10 08/29/2020    YEAST NONE SEEN 08/29/2020    BACTERIA NONE SEEN 08/29/2020    LEUKOCYTESUR SMALL 08/29/2020    UROBILINOGEN 0.2 08/29/2020    BILIRUBINUR NEGATIVE 08/29/2020    BLOODU MODERATE 08/29/2020    GLUCOSEU NEGATIVE 08/29/2020       Imaging: The patient has had the following relevant imaging to this  consult: Skin abdomen pelvis,  which I have independently reviewed along with its accompanying report. The study demonstrates stent in proper position and several foci of likely abscesses.         Thank you for including us in the care of MD Dominick  08/29/20 7:57 PM  Urology

## 2020-08-29 NOTE — ED TRIAGE NOTES
Pt comes in through [facility]. She had a ureter stent placed on 8/20/2020. She also had sepsis during her admission. She began having right sided abdominal pain today that is radiating into her back. She had a fever of 100 at home and took some tylenol. She is now afebrile.

## 2020-08-29 NOTE — ED NOTES
Pt resting quietly in room no needs expressed. Side rails up x2 with call light in reach. Will continue to monitor.        Merritt Alvarez RN  08/29/20 7410

## 2020-08-29 NOTE — PROGRESS NOTES
the right ureter has moved. Pt reported fever, back and flank pain upon admission  -WBC 14.3, ProCal 0.40, Lactic Acid 0.7.    -UA currently shows no Bacteria, however she has been taking Augmentin for a week. -Was given a dose of Zosyn and Vancomycin in the ED --> Switched to Ceftriaxone for sensitivity to E.  Coli (seen on last culture)  -Blood and urine cultures pending  -Continue Flomax and IVF  -Consult Urology   -NPO and hold Lovenox in case patient goes to OR                          Elevated Troponin -- likely demand due to anemia as patient has no prior cardiac history, denies CP and EKG does not show evidence of infarct or ischemia; obtain delta troponin and monitor for symptoms    Normocytic Anemia -- iron studies reveal iron-deficiency, patient symptomatic -- having dyspnea at rest, palpitations -- Hgb dropped from 11 to 8.2 in about a week -- will T&S and transfuse 1 unit -- wiil start ferrous sulfate BID    Melenotic Stools - pt reports dark stools x 3-4 weeks, pt now noted to have iron deficiency anemia, will make patient NPO except sips with meds, start IV protonix, and consult GI     Large gallstone - seen on previous CT 8/20 (has changed positions since) - LFTs and lipase WNL, pt asymptomatic -- recommend following up as OP if patient becomes symptomatic     Mild Hypokalemia - K 3.2 in ED --> repleted, 3.7 this am -- K replacement protocol in place    Hypomagnesemia - 1.6 in ED --> repleted with 1 gm Mag sulfate -- Mg replacement protocol in place    Essential HTN - Mildly hypertensive on admission, now is normotensive --Continue home medications.     Hx of Fibromyalgia - possible source of low back pain, but less likely in the setting of renal pathology     Right Adenexal Cyst - seen on prior CT scan 8/20 --> unchanged in size -- saw Ob/Gyn 8/26/2020 --> follow up as OP for further testing

## 2020-08-29 NOTE — PROGRESS NOTES
1648  In PACU. Drowsy but awakens to name. Vitals stbale. 66 91 21   at bedside. Updated by Dr. Antionette Medina. 1656  Report called to floor RN at this time. More alert, resting easily in bed. Vitals stable.    1703  Out of PACU

## 2020-08-29 NOTE — ED NOTES
Pt watching tv at this time. Pt states pain remains the same after administration of pain medication. Will notify provider.      [facility]neyM[facility] Center  08/29/20 9239

## 2020-08-29 NOTE — ED PROVIDER NOTES
[address] ENCOUNTER      PATIENT NAME: Naldo Petersen  MRN: [id number]  : 1950  WARREN: 2020  PROVIDER: Shana Ralph MD      CHIEF COMPLAINT       Chief Complaint   Patient presents with    Abdominal Pain    Fever       Nurses Notes reviewed and I agreeexcept as noted in the HPI. HISTORY OF PRESENT ILLNESS    Naldo Petersen is a 68 y.o. female who presents to Emergency Department with Abdominal Pain and Fever    75-year-old female with past medical history of renal calculi with recent admission at [facility] for 1.2 cm right proximal ureteral calculi with obstruction and UTI with sepsis treated at [facility] from 2020-2020 when she underwent cystoscopy and insertion of a 6 Maori multilength double-J stent urology. Onset: Gradual  Location: At home  Severity: Moderate  Timing: Since 6 PM  Modifying factors: Worse on touch  Quality: Pain andfever  Radiation: To right groin  Duration: Persistent  Context:  Recent admission at [facility] for 1.2 cm right proximal ureteral calculi with obstruction and UTI with sepsis treated at [facility] from 2020-2020 when she underwent cystoscopy and insertion of a 6 Maori multilength double-J stent urology. Prior episodes: Recent  Therapy today: OTC pain medication. She is on Agmentin now. Associated Symptoms: Nausea and weakness. Fever around 100 tonight. Additional history: None    This HPI was provided by the patient. REVIEW OF SYSTEMS     Review of Systems   Constitutional: Positive for fever. Negative for activity change, appetite change, chills, fatigue and unexpected weight change. HENT: Negative for congestion, ear discharge, ear pain, hearing loss, nosebleeds, rhinorrhea and sore throat. Eyes: Negative for photophobia, pain, discharge, redness and itching. Respiratory: Negative for cough, chest tightness, shortness of breath, wheezing and stridor.     Cardiovascular: Negative for chest pain, palpitations and leg swelling. Gastrointestinal: Negative for abdominal distention, abdominal pain, constipation, diarrhea, nausea and vomiting. Endocrine: Negative for cold intolerance, heat intolerance, polydipsia and polyphagia. Genitourinary: Negative for dysuria, flank pain, frequency and hematuria. Musculoskeletal: Negative for arthralgias, back pain, gait problem, myalgias, neck pain and neck stiffness. Skin: Negative for pallor, rash and wound. Allergic/Immunologic: Negative for environmental allergies and food allergies. Neurological: Negative for dizziness, tremors, syncope, weakness and headaches. Psychiatric/Behavioral: Negative for agitation, behavioral problems, confusion, self-injury, sleep disturbance and suicidal ideas. PAST MEDICAL HISTORY    has a past medical history of Anxiety, Breast cancer ([facility] [address] Fibromyalgia, Hyperlipidemia, Hypertension, IBS (irritable bowel syndrome), Kidney stones, Nausea & vomiting, and Prolonged emergence from general anesthesia. SURGICAL HISTORY      has a past surgical history that includes  section (); other surgical history; Dilation and curettage of uterus; Breast lumpectomy (); Appendectomy (); Hysterectomy (); Colonoscopy (); Ureteroscopy; Lithotripsy; cystourethroscopy (Left, 2013); Cystoscopy (16); and Cystoscopy (Right, 2020).     CURRENT MEDICATIONS       Current Discharge Medication List      CONTINUE these medications which have NOT CHANGED    Details   tamsulosin (FLOMAX) 0.4 MG capsule Take 1 capsule by mouth daily  Qty: 30 capsule, Refills: 3      amoxicillin-clavulanate (AUGMENTIN) 875-125 MG per tablet Take 1 tablet by mouth 2 times daily for 12 days Take at 9 am and 9 pm daily take with a meal  Qty: 24 tablet, Refills: 0      citalopram (CELEXA) 10 MG tablet Take 10 mg by mouth nightly      lisinopril (PRINIVIL;ZESTRIL) 10 MG tablet Take 10 mg by mouth daily eye: No discharge. Left eye: No discharge. Conjunctiva/sclera: Conjunctivae normal.      Pupils: Pupils are equal, round, and reactive to light. Neck:      Musculoskeletal: Normal range of motion and neck supple. Vascular: No JVD. Trachea: No tracheal deviation. Cardiovascular:      Rate and Rhythm: Normal rate and regular rhythm. Heart sounds: Normal heart sounds. No murmur. No friction rub. No gallop. Pulmonary:      Effort: Pulmonary effort is normal. No respiratory distress. Breath sounds: Normal breath sounds. No stridor. No wheezing or rales. Chest:      Chest wall: No tenderness. Abdominal:      General: Bowel sounds are normal. There is no distension. Palpations: Abdomen is soft. There is no mass. Tenderness: There is no abdominal tenderness. There is no guarding or rebound. Hernia: No hernia is present. Genitourinary:     Comments: Right CVA tenderness  Musculoskeletal:         General: No tenderness or deformity. Lymphadenopathy:      Cervical: No cervical adenopathy. Skin:     General: Skin is warm and dry. Capillary Refill: Capillary refill takes less than 2 seconds. Coloration: Skin is not pale. Findings: No erythema or rash. Neurological:      Mental Status: She is alert and oriented to person, place, and time. Cranial Nerves: No cranial nerve deficit. Sensory: No sensory deficit. Motor: No abnormal muscle tone. Coordination: Coordination normal.      Deep Tendon Reflexes: Reflexes normal.   Psychiatric:         Behavior: Behavior normal.         Thought Content:  Thought content normal.         Judgment: Judgment normal.         DIFFERENTIAL DIAGNOSIS:   UTI, pyelonephritis, obstructive uropathy, hydronephrosis, nadia-nephrotic abscess    DIAGNOSTIC RESULTS     EKG: All EKG's are interpreted by the Emergency Department Physician who either signs or Co-signsthis chart in the absence of a meq/L    Glucose 101 70 - 108 mg/dL    BUN 13 7 - 22 mg/dL    CREATININE 0.6 0.4 - 1.2 mg/dL    Calcium 8.3 (L) 8.5 - 10.5 mg/dL   Magnesium   Result Value Ref Range    Magnesium 1.6 1.6 - 2.4 mg/dL   Hepatic function panel   Result Value Ref Range    Alb 2.7 (L) 3.5 - 5.1 g/dL    Total Bilirubin 0.3 0.3 - 1.2 mg/dL    Bilirubin, Direct <0.2 0.0 - 0.3 mg/dL    Alkaline Phosphatase 69 38 - 126 U/L    AST 16 5 - 40 U/L    ALT 17 11 - 66 U/L    Total Protein 5.5 (L) 6.1 - 8.0 g/dL   Lipase   Result Value Ref Range    Lipase 27.6 5.6 - 51.3 U/L   Troponin   Result Value Ref Range    Troponin T 0.019 (A) ng/ml   Lactic acid, plasma   Result Value Ref Range    Lactic Acid 0.7 0.5 - 2.2 mmol/L   COVID-19   Result Value Ref Range    SARS-CoV-2, NAAT NOT DETECTED NOT DETECTED   Procalcitonin   Result Value Ref Range    Procalcitonin 0.40 (H) 0.01 - 0.09 ng/mL   Anion Gap   Result Value Ref Range    Anion Gap 11.0 8.0 - 16.0 meq/L   Glomerular Filtration Rate, Estimated   Result Value Ref Range    Est, Glom Filt Rate >90 ml/min/1.73m2   Osmolality   Result Value Ref Range    Osmolality Calc 275.9 275.0 - 300.0 mOsmol/kg   Urine with Reflexed Micro   Result Value Ref Range    Glucose, Ur NEGATIVE NEGATIVE mg/dl    Bilirubin Urine NEGATIVE NEGATIVE    Ketones, Urine 15 (A) NEGATIVE    Specific Gravity, Urine 1.015 1.002 - 1.030    Blood, Urine MODERATE (A) NEGATIVE    pH, UA 5.0 5.0 - 9.0    Protein, UA TRACE (A) NEGATIVE    Urobilinogen, Urine 0.2 0.0 - 1.0 eu/dl    Nitrite, Urine NEGATIVE NEGATIVE    Leukocyte Esterase, Urine SMALL (A) NEGATIVE    Color, UA YELLOW STRAW-YELLOW    Character, Urine CLOUDY (A) CLEAR-SL CLOUD    RBC, UA 5-10 0-2/hpf /hpf    WBC, UA 25-50W/CLUMPS 0-4/hpf /hpf    Epithelial Cells, UA 5-10 3-5/hpf /hpf    Bacteria, UA NONE SEEN FEW/NONE SEEN /hpf    Casts UA 0-4 HYALINE NONE SEEN /lpf    Crystals, UA OXALATE NONE SEEN    Renal Epithelial, UA 0-2 NONE SEEN    Yeast, UA NONE SEEN NONE SEEN    CASTS 2 NONE SEEN NONE SEEN /lpf    MISCELLANEOUS 2 NONE SEEN    EKG Emergency   Result Value Ref Range    Ventricular Rate 82 BPM    Atrial Rate 82 BPM    P-R Interval 142 ms    QRS Duration 78 ms    Q-T Interval 420 ms    QTc Calculation (Bazett) 490 ms    P Axis 60 degrees    R Axis 44 degrees    T Axis 96 degrees       EMERGENCY DEPARTMENT COURSE:   Vitals:    Vitals:    08/29/20 0018 08/29/20 0120 08/29/20 0230 08/29/20 0400   BP: (!) 146/80 (!) 140/79 137/79 136/84   Pulse: 85 94 88 70   Resp: 17 16 18 17   Temp:    98.2 °F (36.8 °C)   TempSrc:    Oral   SpO2: 98% 99% 99% 100%   Weight:    136 lb 14.4 oz (62.1 kg)       12:04 AM: Patient is seen and evaluated in a timely fashion. ACTIONS:    Large bore IV  Tele monitor  EKG  CT A/P with IV contrast  Labs  Medications:   Medications   0.9 % sodium chloride bolus (has no administration in time range)   morphine (PF) injection 2 mg (has no administration in time range)   ondansetron (ZOFRAN) injection 4 mg (has no administration in time range)       MEDICAL DECISION MAKINGS:    Pain was controlled with multiple doses of IV morphine. Laboratory results were reviewed: WBC 14.3, creatinine 0.6, lipase 28, lactic acid 0.7, troponin 0.019.  UA has pyuria, negative nitrite. Her troponin elevation seemed to be secondary to infection, her EKG has no acute ischemic changes. CT abdomen pelvis with IV contrast interpreted by radiologist revealed multifocal abscesses throughout of the right kidney. Admission required. Patient received Zosyn and vancomycin while in ED. Patient had sepsis picture, but no severe sepsis or septic shock. She received normal saline bolus 1L when she arrived here first.  She was admitted to hospitalist service by Dr. Ila Mckeon. CRITICAL CARE:   None    CONSULTS:  Dr. Kusum Koehler:  None    FINAL IMPRESSION      1.  Abscess of right kidney          DISPOSITION/PLAN   Admit    PATIENT REFERRED TO:  Janny Nicole., MD  [id number], [place] [address]  [phone]            DISCHARGE MEDICATIONS:  Current Discharge Medication List          (Please note that portions of this note were completed with a voice recognition program.  Efforts were made to edit the dictations but occasionally words aremis-transcribed.)    MD Ladonna Ji MD  08/29/20 2365

## 2020-08-29 NOTE — ED NOTES
ED to inpatient nurses report    Chief Complaint   Patient presents with    Abdominal Pain    Fever      Present to ED from home  LOC: alert and orientated to name, place, date  Vital signs   Vitals:    08/28/20 2324 08/29/20 0018 08/29/20 0120 08/29/20 0230   BP: (!) 140/74 (!) 146/80 (!) 140/79 137/79   Pulse: 78 85 94 88   Resp: 18 17 16 18   Temp: 98.7 °F (37.1 °C)      TempSrc: Oral      SpO2: 98% 98% 99% 99%   Weight: 134 lb (60.8 kg)         Oxygen Baseline RA     Current needs required RA  LDAs:   Peripheral IV 08/29/20 Right Antecubital (Active)   Site Assessment Clean;Dry; Intact 08/29/20 0230   Line Status Infusing 08/29/20 0230   Dressing Status Clean;Dry; Intact 08/29/20 0230   Dressing Intervention New 08/29/20 0018     Mobility: Independent  Pending ED orders: Vancomycin  Present condition: Pt vitals stable and pain controlled. Antibiotics infusing at this time.     Electronically signed by Claudia Bennett RN on 8/29/2020 at 4:01 AM       Claudia Bennett RN  08/29/20 5449

## 2020-08-29 NOTE — H&P
to monitor for new pain or elevated lab work. 6.) HTN:  Mildly hypertensive on admission, now is normotensive. Continue home medications. 7.) Fibromyalgia:  Possible source of low back pain, but less likely in the setting of renal pathology. 8.) Adenexal Cyst: Seen on prior CT scan, seen on current CT scan too. Has not increased in size since prior admission. Was set to follow-up outpatient for findings. History Of Present Illness:      Ms. Minnie Norman is a 68year old female with a past medical history of HTN, Fibromyalgia, Frequent Kidney stones (with lithotripsy for treatment), IBS, and BCA who presented to the ER for increased Right flank pain and fever. She states she was admitted last week for a kidney infection and a Kidney stone and had a ureteral stent placed on 8/21. She was discharged with Augmentin and Flomax and was scheduled to have an outpatient visit with Urology on this coming Tuesday. She stated that for the past few days, she has continued to run a low grade fever which she has treated with Tylenol, and developed increasing right flank and lower back pain. She states that the pain has become so intense she has developed nausea, but hasn't vomited yet. She denies chills, chest pain, shortness of breath, dysuria, burning of urination, or blood in her urine. She does state new onset diarrhea, but feels in it a side-effect of the antibiotics she was on, which she was treating with imodium.        Past Medical History:          Diagnosis Date    Anxiety     Breast cancer ([address]     Fibromyalgia     Hyperlipidemia     Hypertension     IBS (irritable bowel syndrome)     Kidney stones     Nausea & vomiting     Prolonged emergence from general anesthesia        Past Surgical History:          Procedure Laterality Date    APPENDECTOMY  1958    BREAST [facility]    COLONOSCOPY  2007    CYSTOSCOPY  4/27/16    Right Ureteroscopy Laser Lithotripsy Basket Retrieval of Stone Fragments Right Ureteral Stent Placement, Left Ureteroscopy Basket Retrieval of Stone Left Ureteral Stent Placement - Dr. Shaheen Charles Right 8/21/2020    CYSTOSCOPY, RIGHT  URETERAL STENT INSERTION performed by Reyes Palacios MD at [address] Left 12/02/2013    HOLMIUM LASER LITHOTRIPSY, BASKET EXTRACTION  STENT INSERTION     DILATION AND CURETTAGE OF UTERUS      x3    HYSTERECTOMY  1997    LITHOTRIPSY      OTHER SURGICAL HISTORY      samuel?  URETEROSCOPY         Medications Prior to Admission:      Prior to Admission medications    Medication Sig Start Date End Date Taking? Authorizing Provider   tamsulosin (FLOMAX) 0.4 MG capsule Take 1 capsule by mouth daily 8/25/20   Marcia Patel MD   polyethylene glycol (GLYCOLAX) 17 g packet Take 17 g by mouth daily as needed for Constipation 8/24/20 9/23/20  Marcia Patel MD   amoxicillin-clavulanate (AUGMENTIN) 875-125 MG per tablet Take 1 tablet by mouth 2 times daily for 12 days Take at 9 am and 9 pm daily take with a meal 8/25/20 9/6/20  Marcia Patel MD   citalopram (CELEXA) 10 MG tablet Take 10 mg by mouth nightly    Historical Provider, MD   lisinopril (PRINIVIL;ZESTRIL) 10 MG tablet Take 10 mg by mouth daily    Historical Provider, MD   docusate sodium (COLACE) 100 MG capsule Take 1 capsule by mouth daily as needed for Constipation 4/27/16   Juana Estrada MD   Phosphatidylserine 100 MG CAPS Take 1.5 capsules by mouth daily    Historical Provider, MD   Chromium 200 MCG TABS Take 1 tablet by mouth daily    Historical Provider, MD   Nutritional Supplements (BOOST PO) Take by mouth 2 times daily    Historical Provider, MD   melatonin 5 MG TABS tablet Take 5 mg by mouth nightly    Historical Provider, MD   Alosetron HCl (LOTRONEX PO) Take by mouth as needed    Historical Provider, MD   fenofibrate (TRICOR) 145 MG tablet Take 145 mg by mouth daily.     Historical Provider, MD epigastric pain, non-distended with normal bowel sounds. Musculoskeletal:  No clubbing or cyanosis, mild lower limb edema bilaterally. Full range of motion without deformity. Skin: Skin color, texture, turgor normal.  No rashes or lesions. Neurologic:  Neurovascularly intact without any focal sensory/motor deficits. Cranial nerves: II-XII intact, grossly non-focal.  Psychiatric:  Alert and oriented, thought content appropriate, normal insight  Capillary Refill: Brisk,< 3 seconds   Peripheral Pulses: +2 palpable, equal bilaterally       Labs:     Recent Labs     08/28/20  2352   WBC 14.3*   HGB 9.0*   HCT 27.5*        Recent Labs     08/28/20  2352      K 3.2*      CO2 20*   BUN 13   CREATININE 0.6   CALCIUM 8.3*     Recent Labs     08/28/20  2352   AST 16   ALT 17   BILIDIR <0.2   BILITOT 0.3   ALKPHOS 69     No results for input(s): INR in the last 72 hours. No results for input(s): Michelle Caul in the last 72 hours. Urinalysis:      Lab Results   Component Value Date    NITRU NEGATIVE 08/29/2020    WBCUA 25-50W/CLUMPS 08/29/2020    BACTERIA NONE SEEN 08/29/2020    RBCUA 5-10 08/29/2020    BLOODU MODERATE 08/29/2020    GLUCOSEU NEGATIVE 08/29/2020       Intake & Output:  No intake/output data recorded. I/O this shift:  In: 1000 [IV Piggyback:1000]  Out: -       Radiology:     CT Abdomen/Pelvis: I have reviewed the CT with the following interpretation: Right Renal Abscesses, Large Gallstone with mildly distended Gallbladder, Moderate pleural effusions, Left renal pelvis kidney stone about 4mm. EKG:  I have reviewed the EKG with the following interpretation: NSR    CT ABDOMEN PELVIS W IV CONTRAST Additional Contrast? None   Final Result   Multifocal abscess collections are favored throughout the right kidney. Small to moderate bilateral effusions. Large gallstone. **This report has been created using voice recognition software.  It may contain minor errors which are inherent in voice recognition technology. **      Final report electronically signed by Dr. Gilbert Monaco on 2020 2:37 AM           DVT prophylaxis: Lovenox    Code Status: Full Code    Active Hospital Problems    Diagnosis Date Noted    Renal abscess, right [N15.1] 2020     Thank you Estell Apgar, MD for the opportunity to be involved in this patient's care.     Electronically signed by Francis Wagner DO on 2020 at 4:17 AM

## 2020-08-29 NOTE — CONSULTS
[id number] CONSULT            Patient: Aleksandra Stahl  : 1950  Acct#: [de-identified]  Consult seen for:   Aleksandra Stahl is a 68 y.o. , female with melena. ASSESSMENT:     1. Melena - For past two weeks. 2. Anemia - normocytic, hgb currently 8.2g, was 10.2g 5 days ago. Getting one unit of blood now. 3. Abdominal pain - likely related to renal calculi  4. Ureteral calculi- s/p stent recently. 5. Elevated troponin - no history of MI or CHF. 6. Gallstone - large gallstone visualized on CT     PLAN:   1. Emergent EGD for active GI Bleeding. HISTORY OF PRESENT ILLNESS     75yo woman who presented to emergency room last night with abdominal pain and fever. She has a kidney stone with recent admission for ureteral calculi with obstruction and UTI with sepsis. GI consult obtained due to an episode of melena. EGD: No   Colonoscopy:  . Dr. Mendel Robes, reportedly negative. Family history of GI malignancy:  None    + melena-, bright red blood per rectum, no unexplained weight loss. No dysphagia, odynophagia, pyrosis/sour brush, globus, dyspepsia. PROBLEM LIST    does not have any pertinent problems on file. PAST MEDICAL HISTORY     has a past medical history of Anxiety, Breast cancer ([address] Fibromyalgia, Hyperlipidemia, Hypertension, IBS (irritable bowel syndrome), Kidney stones, Nausea & vomiting, and Prolonged emergence from general anesthesia. PAST SURGICAL HISTORY      has a past surgical history that includes  section (); other surgical history; Dilation and curettage of uterus; Breast lumpectomy (); Appendectomy (); Hysterectomy (); Colonoscopy (); Ureteroscopy; Lithotripsy; cystourethroscopy (Left, 2013); Cystoscopy (16); and Cystoscopy (Right, 2020). HOME MEDICATIONS      Prior to Admission medications    Medication Sig Start Date End Date Taking?  Authorizing Provider   tamsulosin (FLOMAX) 0.4 MG capsule Take 1 capsule by mouth daily 8/25/20  Yes China Rutherford MD   amoxicillin-clavulanate (AUGMENTIN) 875-125 MG per tablet Take 1 tablet by mouth 2 times daily for 12 days Take at 9 am and 9 pm daily take with a meal 8/25/20 9/6/20 Yes China Rutherford MD   citalopram (CELEXA) 10 MG tablet Take 10 mg by mouth nightly   Yes Historical Provider, MD   lisinopril (PRINIVIL;ZESTRIL) 10 MG tablet Take 10 mg by mouth daily   Yes Historical Provider, MD   melatonin 5 MG TABS tablet Take 5 mg by mouth nightly   Yes Historical Provider, MD   Probiotic Product (PROBIOTIC DAILY PO) Take by mouth daily PROBIO 5   Yes Historical Provider, MD   Multiple Vitamins-Minerals (THERAPEUTIC MULTIVITAMIN-MINERALS) tablet Take 1 tablet by mouth daily. Yes Historical Provider, MD   polyethylene glycol (GLYCOLAX) 17 g packet Take 17 g by mouth daily as needed for Constipation 8/24/20 9/23/20  China Rutherford MD   docusate sodium (COLACE) 100 MG capsule Take 1 capsule by mouth daily as needed for Constipation 4/27/16   Gentry Andrade MD   UNABLE TO FIND Take 4 tablets by mouth daily.  [employer] ambrotose  For fibromyalgia    Historical Provider, MD       MEDICATIONS    Scheduled Meds:   citalopram  10 mg Oral Nightly    lisinopril  10 mg Oral Daily    melatonin  4.5 mg Oral Nightly    therapeutic multivitamin-minerals  1 tablet Oral Daily    lactobacillus  1 capsule Oral Daily    sodium chloride flush  10 mL Intravenous 2 times per day    [Held by provider] enoxaparin  40 mg Subcutaneous Daily    tamsulosin  0.4 mg Oral Daily    cefTRIAXone (ROCEPHIN) IV  1 g Intravenous Q24H    potassium replacement protocol   Other RX Placeholder    pantoprazole  40 mg Intravenous Daily    ferrous sulfate  325 mg Oral BID WC    fentaNYL        midazolam         Continuous Infusions:   sodium chloride 125 mL/hr at 08/29/20 0420     PRN Meds:.docusate sodium, polyethylene glycol, sodium chloride flush, acetaminophen **OR** acetaminophen, morphine, HYDROcodone-acetaminophen, fentaNYL, midazolam    ALLERGIES   is allergic to asa [aspirin]. SOCIAL HISTORY    Social History  Social History     Socioeconomic History    Marital status:      Spouse name: None    Number of children: None    Years of education: None    Highest education level: None   Occupational History    None   Social Needs    Financial resource strain: None    Food insecurity     Worry: None     Inability: None    Transportation needs     Medical: None     Non-medical: None   Tobacco Use    Smoking status: Former Smoker     Packs/day: 0.50     Years: 6.00     Pack years: 3.00     Last attempt to quit: 1973     Years since quittin.1    Smokeless tobacco: Never Used   Substance and Sexual Activity    Alcohol use: Yes     Comment: OCC.  Drug use: No    Sexual activity: None   Lifestyle    Physical activity     Days per week: None     Minutes per session: None    Stress: None   Relationships    Social connections     Talks on phone: None     Gets together: None     Attends Confucianist service: None     Active member of club or organization: None     Attends meetings of clubs or organizations: None     Relationship status: None    Intimate partner violence     Fear of current or ex partner: None     Emotionally abused: None     Physically abused: None     Forced sexual activity: None   Other Topics Concern    None   Social History Narrative    None       FAMILY HISTORY    family history includes Arthritis in her mother; Cancer in her maternal aunt, maternal grandfather, and mother; Heart Disease in her father and paternal grandmother; Stroke in her maternal grandmother and mother. REVIEW OF SYSTEMS:    GENERAL:  No fever, chills or weight loss. EYES:  No  blurred vision, double vision, glaucoma, pain on exposure to light. CARDIOVASCULAR:  No chest pain or palpitations. RESPIRATORY:  +dyspnea - cough. GI:  See history. MUSCULOSKELETAL:  No new painful or swollen joints or myalgias. :   No dysuria or hematuria. SKIN:  No rashes or jaundice. NEUROLOGIC:   No headaches or  seizures,  numbness or tingling of arms, or legs. PSYCH:  No anxiety or depression. ENDOCRINE:   No polyuria or polydipsia. BLOOD:  + anemia,- bleeding disorder,+ now blood or blood product transfusion. PHYSICAL EXAMINATION:     weight is 136 lb 14.4 oz (62.1 kg). Her oral temperature is 99.2 °F (37.3 °C). Her blood pressure is 139/83 and her pulse is 77. Her respiration is 16 and oxygen saturation is 96%. GEN: Well nourished, well developed. LUNGS:  Clear to auscultation bilaterally. Chest rises equally on inspiration. CARDIOVASCULAR:  Regular rate and rhythm without murmurs, rubs or gallops. ABDOMEN:  Soft, nontender and nondistended with normal bowel sounds. HEAD:  Normal cephalic/atraumatic. Extraoccular motions intact bilaterally. NECK:  No lymphadenopathy or bruits. UPPER EXTREMITIES:  No cyanosis, clubbing, or edema. DERM:  No rash or jaundice. LOWER EXTREMITIES:  No cyanosis, clubbing, or edema. NEURO:  Alert and oriented x4. Patient moves all extremities and has gross sensation in all extremities. REVIEW OF DIAGNOSTIC TESTING AND LABS:      RADIOLOGY:  8/29/20 CT ABDOMEN PELVIS W IV CONTRAST   Multifocal abscess collections are favored throughout the right kidney. Small to moderate bilateral effusions. Large gallstone. 8/23/20 XR ABDOMEN (KUB) (SINGLE AP VIEW)   Renal calculi. Double-J stent right side. Apparent gallstone. No acute findings.      LABS:    CBC:   Recent Labs     08/28/20 2352 08/29/20  0601   WBC 14.3* 12.6*   HGB 9.0* 8.2*    257     BMP:    Recent Labs     08/28/20 2352 08/29/20  0601    139   K 3.2* 3.7    111   CO2 20* 21*   BUN 13 11   CREATININE 0.6 0.5   GLUCOSE 101 125*     Hepatic:   Recent Labs     08/28/20 2352 08/29/20  0601   ALKPHOS 69 55 ALT 17 13   AST 16 13   PROT 5.5* 4.6*   BILITOT 0.3 0.3   BILIDIR <0.2  --    LABALBU 2.7* 2.2*     Amylase and Lipase:  Recent Labs     08/28/20  2352   LIPASE 27.6     Lactic Acid:   Recent Labs     08/29/20  0601   LACTA 0.7     Calcium:  Recent Labs     08/29/20  0601   CALCIUM 7.7*     Magnesium:  Recent Labs     08/29/20  0601   MG 1.6     Phosphorus:  Recent Labs     08/29/20  0601   PHOS 2.1*           Prepared by Cy Taylor RNC, MS   Patient seen and examined by Roberta Vieyra MD  Note reviewed, updated and signed by MD Roberta Bolton MD, [facility]

## 2020-08-29 NOTE — PROCEDURES
Will need to be on Protonix 40mg daily x 8 weeks . Follow up pathology from biopsies. 3. Will need a repeat EGD in 6-8 weeks with Dr. Nancy Johnson  To confirm healing of the ulcers. 4. Resume regular diet.        Specimens: were obtained    EBL : < 10 mL    (The following sections must be completed)  Post-Sedation Vital Signs: Vital signs were reviewed and were stable after the procedure (see flow sheet for vitals)            Post-Sedation Exam: Lungs: clear to auscultation bilaterally and Cardiovascular: regular rate and rhythm           Complications: none        Rosas Mason MD, Chip An

## 2020-08-29 NOTE — PROGRESS NOTES
Pt admitted to  6K16 from ED. Complaints: pain. IV normal saline infusing into the forearm right, condition patent and no redness at a rate of 125 mls/ hour with about 600 mls in the bag still. IV site free of s/s of infection or infiltration. Vital signs obtained. Assessment and data collection initiated. Two nurse skin assessment performed by Erwin KIMBLE and Shanell Zhou. Oriented to room. Policies and procedures for 6[facility] explained. Erwin KIMBLE discussed hourly rounding with patient addressing 5 P's. Fall prevention and safety brochure discus Patient has Declined for family/representative to be notified. All questions answered with no further questions at this time.

## 2020-08-29 NOTE — ED NOTES
Pt assisted to restroom at this time for collection of urine specimen.      [facility]  08/29/20 0244

## 2020-08-30 ENCOUNTER — APPOINTMENT (OUTPATIENT)
Dept: GENERAL RADIOLOGY | Age: 70
DRG: 698 | End: 2020-08-30
Payer: MEDICARE

## 2020-08-30 LAB
ANION GAP SERPL CALCULATED.3IONS-SCNC: 10 MEQ/L (ref 8–16)
BASOPHILS # BLD: 0.3 %
BASOPHILS ABSOLUTE: 0 THOU/MM3 (ref 0–0.1)
BUN BLDV-MCNC: 9 MG/DL (ref 7–22)
CALCIUM SERPL-MCNC: 8.3 MG/DL (ref 8.5–10.5)
CHLORIDE BLD-SCNC: 115 MEQ/L (ref 98–111)
CO2: 19 MEQ/L (ref 23–33)
CREAT SERPL-MCNC: 0.5 MG/DL (ref 0.4–1.2)
EOSINOPHIL # BLD: 0.7 %
EOSINOPHILS ABSOLUTE: 0.1 THOU/MM3 (ref 0–0.4)
ERYTHROCYTE [DISTWIDTH] IN BLOOD BY AUTOMATED COUNT: 15.4 % (ref 11.5–14.5)
ERYTHROCYTE [DISTWIDTH] IN BLOOD BY AUTOMATED COUNT: 51.4 FL (ref 35–45)
GFR SERPL CREATININE-BSD FRML MDRD: > 90 ML/MIN/1.73M2
GLUCOSE BLD-MCNC: 84 MG/DL (ref 70–108)
HCT VFR BLD CALC: 32.9 % (ref 37–47)
HEMOGLOBIN: 10.9 GM/DL (ref 12–16)
IMMATURE GRANS (ABS): 0.11 THOU/MM3 (ref 0–0.07)
IMMATURE GRANULOCYTES: 0.7 %
LACTIC ACID: 0.7 MMOL/L (ref 0.5–2.2)
LYMPHOCYTES # BLD: 8.8 %
LYMPHOCYTES ABSOLUTE: 1.3 THOU/MM3 (ref 1–4.8)
MCH RBC QN AUTO: 30.4 PG (ref 26–33)
MCHC RBC AUTO-ENTMCNC: 33.1 GM/DL (ref 32.2–35.5)
MCV RBC AUTO: 91.9 FL (ref 81–99)
MONOCYTES # BLD: 4.4 %
MONOCYTES ABSOLUTE: 0.6 THOU/MM3 (ref 0.4–1.3)
NUCLEATED RED BLOOD CELLS: 0 /100 WBC
PLATELET # BLD: 334 THOU/MM3 (ref 130–400)
PMV BLD AUTO: 11.6 FL (ref 9.4–12.4)
POTASSIUM REFLEX MAGNESIUM: 3.7 MEQ/L (ref 3.5–5.2)
PROCALCITONIN: 0.25 NG/ML (ref 0.01–0.09)
RBC # BLD: 3.58 MILL/MM3 (ref 4.2–5.4)
SEG NEUTROPHILS: 85.1 %
SEGMENTED NEUTROPHILS ABSOLUTE COUNT: 12.5 THOU/MM3 (ref 1.8–7.7)
SODIUM BLD-SCNC: 144 MEQ/L (ref 135–145)
WBC # BLD: 14.7 THOU/MM3 (ref 4.8–10.8)

## 2020-08-30 PROCEDURE — 93010 ELECTROCARDIOGRAM REPORT: CPT | Performed by: NUCLEAR MEDICINE

## 2020-08-30 PROCEDURE — 83605 ASSAY OF LACTIC ACID: CPT

## 2020-08-30 PROCEDURE — 6360000002 HC RX W HCPCS: Performed by: STUDENT IN AN ORGANIZED HEALTH CARE EDUCATION/TRAINING PROGRAM

## 2020-08-30 PROCEDURE — 94760 N-INVAS EAR/PLS OXIMETRY 1: CPT

## 2020-08-30 PROCEDURE — 80048 BASIC METABOLIC PNL TOTAL CA: CPT

## 2020-08-30 PROCEDURE — 6370000000 HC RX 637 (ALT 250 FOR IP): Performed by: STUDENT IN AN ORGANIZED HEALTH CARE EDUCATION/TRAINING PROGRAM

## 2020-08-30 PROCEDURE — 2580000003 HC RX 258: Performed by: STUDENT IN AN ORGANIZED HEALTH CARE EDUCATION/TRAINING PROGRAM

## 2020-08-30 PROCEDURE — 85025 COMPLETE CBC W/AUTO DIFF WBC: CPT

## 2020-08-30 PROCEDURE — 71045 X-RAY EXAM CHEST 1 VIEW: CPT

## 2020-08-30 PROCEDURE — 1200000003 HC TELEMETRY R&B

## 2020-08-30 PROCEDURE — 36415 COLL VENOUS BLD VENIPUNCTURE: CPT

## 2020-08-30 PROCEDURE — 99232 SBSQ HOSP IP/OBS MODERATE 35: CPT | Performed by: FAMILY MEDICINE

## 2020-08-30 PROCEDURE — C9113 INJ PANTOPRAZOLE SODIUM, VIA: HCPCS | Performed by: STUDENT IN AN ORGANIZED HEALTH CARE EDUCATION/TRAINING PROGRAM

## 2020-08-30 PROCEDURE — 84145 PROCALCITONIN (PCT): CPT

## 2020-08-30 RX ORDER — PANTOPRAZOLE SODIUM 40 MG/1
40 TABLET, DELAYED RELEASE ORAL
Status: DISCONTINUED | OUTPATIENT
Start: 2020-08-31 | End: 2020-09-01 | Stop reason: HOSPADM

## 2020-08-30 RX ORDER — FUROSEMIDE 10 MG/ML
20 INJECTION INTRAMUSCULAR; INTRAVENOUS ONCE
Status: COMPLETED | OUTPATIENT
Start: 2020-08-30 | End: 2020-08-30

## 2020-08-30 RX ADMIN — FERROUS SULFATE TAB 325 MG (65 MG ELEMENTAL FE) 325 MG: 325 (65 FE) TAB at 08:40

## 2020-08-30 RX ADMIN — MULTIPLE VITAMINS W/ MINERALS TAB 1 TABLET: TAB at 08:40

## 2020-08-30 RX ADMIN — FERROUS SULFATE TAB 325 MG (65 MG ELEMENTAL FE) 325 MG: 325 (65 FE) TAB at 16:56

## 2020-08-30 RX ADMIN — Medication 4.5 MG: at 20:43

## 2020-08-30 RX ADMIN — CEFTRIAXONE SODIUM 1 G: 1 INJECTION, POWDER, FOR SOLUTION INTRAMUSCULAR; INTRAVENOUS at 08:40

## 2020-08-30 RX ADMIN — PANTOPRAZOLE SODIUM 40 MG: 40 INJECTION, POWDER, FOR SOLUTION INTRAVENOUS at 08:37

## 2020-08-30 RX ADMIN — ACETAMINOPHEN 650 MG: 325 TABLET ORAL at 18:40

## 2020-08-30 RX ADMIN — TAMSULOSIN HYDROCHLORIDE 0.4 MG: 0.4 CAPSULE ORAL at 08:40

## 2020-08-30 RX ADMIN — Medication 1 CAPSULE: at 08:40

## 2020-08-30 RX ADMIN — FUROSEMIDE 20 MG: 10 INJECTION, SOLUTION INTRAMUSCULAR; INTRAVENOUS at 08:38

## 2020-08-30 RX ADMIN — SODIUM CHLORIDE, PRESERVATIVE FREE 10 ML: 5 INJECTION INTRAVENOUS at 20:43

## 2020-08-30 RX ADMIN — ACETAMINOPHEN 650 MG: 325 TABLET ORAL at 08:43

## 2020-08-30 RX ADMIN — LISINOPRIL 10 MG: 10 TABLET ORAL at 16:56

## 2020-08-30 RX ADMIN — CITALOPRAM 10 MG: 20 TABLET, FILM COATED ORAL at 20:43

## 2020-08-30 RX ADMIN — SODIUM CHLORIDE, PRESERVATIVE FREE 10 ML: 5 INJECTION INTRAVENOUS at 08:40

## 2020-08-30 ASSESSMENT — PAIN SCALES - GENERAL
PAINLEVEL_OUTOF10: 4
PAINLEVEL_OUTOF10: 0
PAINLEVEL_OUTOF10: 0
PAINLEVEL_OUTOF10: 6
PAINLEVEL_OUTOF10: 0

## 2020-08-30 NOTE — PROGRESS NOTES
PROGRESS NOTE      Patient:  Laney Riggs      Unit/Bed:6K-16/016-A    YOB: 1950    MRN: [id number]       Acct: [de-identified]     PCP: Kate Guevara MD    Date of Admission: 8/28/2020      Assessment/Plan:    Anticipated Discharge in : 1-2 days    Renal abscesses s/p urethral stent for urethral calculus:  - CT 8/29/2020 with multifocal abscess collections throughout right kidney. Patient endorses flank pain, fever. WBC 14.3, procal 0.40, lactic acid 0.7. U/A with small LE, wbc 25-50 with clumps. - Urology consulted - continue with conservative treatment   - Currently on IV Ceftriaxone for E. Coli as seen on last culture  - Follow recent UCx to narrow antibiotics choice  - WBC trended up. Patient is afebrile. - Continue with IV Ceftriaxone   - Tamsulosin daily as per Urology   - CBC in the AM    Melena, resolved:  - GI consulted - patient is s/p EGD 8/29/2020 0 - multiple clean based gastric ulcers in stomach in body/antrum/pylorus with no active bleeding, biopsies taken. - Protonix 40mg BID x 8 weeks as per GI with repeat EGD in 6-8 weeks, GI signed off  - Hgb remains stable. Patient reports resolution of dark stools today  - Regular diet resumed  - CBC in the AM    Small to moderate bilateral effusions:  - Seen on CT abdomen on admission. Patient reports SOB this morning, crackles at bases  - Will dc IVF  - Lasix 20mg IV x once  - CXR for improvement   - Will monitor closely if further diuresis needed    Leukocytosis due to renal abscesses:  - WBC trended up slightly today  - Repeat CBC in the AM    Iron deficiency anemia:  - Continue with iron supplements  - Hgb stable. CBC in the AM    Cholelithiasis:  - Large gallstone seen on CT abdomen pelvis     NAGMA:  - BMP in the AM    Essential HTN, controlled:  - Continue Lisinopril     DVT prophylaxis: SCDs  Disposition: Will be discharged home once medically stable. Chief Complaint: flank pain, fever    Hospital Course:    As per H&P: \"Ms. Nancy Noel is a 68year old female with a past medical history of HTN, Fibromyalgia, Frequent Kidney stones (with lithotripsy for treatment), IBS, and BCA who presented to the ER for increased Right flank pain and fever. She states she was admitted last week for a kidney infection and a Kidney stone and had a ureteral stent placed on 8/21. She was discharged with Augmentin and Flomax and was scheduled to have an outpatient visit with Urology on this coming Tuesday. She stated that for the past few days, she has continued to run a low grade fever which she has treated with Tylenol, and developed increasing right flank and lower back pain. She states that the pain has become so intense she has developed nausea, but hasn't vomited yet. She denies chills, chest pain, shortness of breath, dysuria, burning of urination, or blood in her urine. She does state new onset diarrhea, but feels in it a side-effect of the antibiotics she was on, which she was treating with imodium. \"    Subjective:   Patient seen and examined at bedside. Reports she feels SOB and bloated in her abdomen since this morning. Otherwise, denies any chest pain, palpitations, abdominal pain, N/V. States her stool is no longer dark.       Medications:  Reviewed    Infusion Medications   Scheduled Medications    citalopram  10 mg Oral Nightly    lisinopril  10 mg Oral Daily    melatonin  4.5 mg Oral Nightly    therapeutic multivitamin-minerals  1 tablet Oral Daily    lactobacillus  1 capsule Oral Daily    sodium chloride flush  10 mL Intravenous 2 times per day    [Held by provider] enoxaparin  40 mg Subcutaneous Daily    tamsulosin  0.4 mg Oral Daily    cefTRIAXone (ROCEPHIN) IV  1 g Intravenous Q24H    potassium replacement protocol   Other RX Placeholder    pantoprazole  40 mg Intravenous Daily    ferrous sulfate  325 mg Oral BID      PRN Meds: docusate sodium, polyethylene glycol, sodium chloride flush, acetaminophen **OR** acetaminophen, morphine, HYDROcodone-acetaminophen      Intake/Output Summary (Last 24 hours) at 8/30/2020 1115  Last data filed at 8/30/2020 0928  Gross per 24 hour   Intake 2680.49 ml   Output 2250 ml   Net 430.49 ml       Diet:  DIET GENERAL;    Exam:  BP (!) 161/88   Pulse 103   Temp 99.1 °F (37.3 °C) (Oral)   Resp 16   Wt 140 lb 12.8 oz (63.9 kg)   SpO2 95%   BMI 25.75 kg/m²     General appearance: No apparent distress, appears stated age and cooperative. HEENT: Conjunctivae/corneas clear. Neck: Supple, with full range of motion. No jugular venous distention. Trachea midline. Respiratory:  Normal respiratory effort. Crackles at bases bilaterally. Cardiovascular: Tachycardic with normal S1/S2 without murmurs, rubs or gallops. Abdomen: Soft, non-tender, non-distended with normal bowel sounds. Musculoskeletal: +2 pitting edema RLE, no calf tenderness noted  Skin: Skin color, texture, turgor normal.  No rashes or lesions. Neurologic:  Neurovascularly intact without any focal sensory/motor deficits. Cranial nerves: II-XII intact, grossly non-focal.  Psychiatric: Alert and oriented, thought content appropriate, normal insight  Capillary Refill: Brisk,< 3 seconds   Peripheral Pulses: +2 palpable, equal bilaterally       Labs:   Recent Labs     08/28/20 2352 08/29/20  0601 08/29/20 2026 08/30/20  0634   WBC 14.3* 12.6*  --  14.7*   HGB 9.0* 8.2* 10.4* 10.9*   HCT 27.5* 25.8* 32.5* 32.9*    257  --  334     Recent Labs     08/28/20 2352 08/29/20  0601 08/30/20  0634    139 144   K 3.2* 3.7 3.7    111 115*   CO2 20* 21* 19*   BUN 13 11 9   CREATININE 0.6 0.5 0.5   CALCIUM 8.3* 7.7* 8.3*   PHOS  --  2.1*  --      Recent Labs     08/28/20 2352 08/29/20  0601   AST 16 13   ALT 17 13   BILIDIR <0.2  --    BILITOT 0.3 0.3   ALKPHOS 69 55     No results for input(s): INR in the last 72 hours. No results for input(s): Yariel Nash in the last 72 hours.     Urinalysis:      Lab Results Component Value Date    NITRU NEGATIVE 08/29/2020    WBCUA 25-50W/CLUMPS 08/29/2020    BACTERIA NONE SEEN 08/29/2020    RBCUA 5-10 08/29/2020    BLOODU MODERATE 08/29/2020    GLUCOSEU NEGATIVE 08/29/2020       Radiology:  CT ABDOMEN PELVIS W IV CONTRAST Additional Contrast? None   Final Result   Multifocal abscess collections are favored throughout the right kidney. Small to moderate bilateral effusions. Large gallstone. **This report has been created using voice recognition software. It may contain minor errors which are inherent in voice recognition technology. **      Final report electronically signed by Dr. Kelvin Boyd on 8/29/2020 2:37 AM      XR CHEST PORTABLE    (Results Pending)       Diet: DIET GENERAL;    Code Status: Full Code    PT/OT Eval Status: N/A      Electronically signed by Richmond Wilson MD on 8/30/2020 at 11:15 AM

## 2020-08-30 NOTE — PROGRESS NOTES
Gastroenterology Progress Note:   Patient: Aleksandra Stahl  : 1950  Acct#: [de-identified]    Patient Name:  Aleksandra Stahl , 68 y.o. , female with melena    ASSESSMENT     1. Melena - s/p EGD 20 -  Multiple clean based gastric ulcers in the stomach body/antrum/ pylorus- No active bleeding. Biopsies of stomach taken to exclude H. Pylori. 2.  Anemia - normocytic, hgb currently 10.9g, s/p 1 unit of blood yesterday , up from 8.2g. 3.  Abdominal pain - likely related to renal calculi, s/p recent stent. 4.  Ureteral calculi- Urology following has abscess on abx  5. Gallstone - large gallstone 2cm  visualized on CT 20- This is a risk factor for Gallbladder cancer and gallbladder should be removed if possible when stable. 6. SOB - Some Fluid overload, being diuresed today. 7. GI signing off. Principal Problem:    Abscess of right kidney  Active Problems:    Kidney stone    Obstructive uropathy    Essential hypertension  Resolved Problems:    * No resolved hospital problems. *     Patient Active Problem List   Diagnosis    Kidney stone    [facility] ([facility])    Sepsis ([address]    Pyohydronephrosis    Pyelonephritis    Hypotension    Dehydration    Acute kidney injury ([address]    Obstructive uropathy    History of breast cancer    Simple adnexal cyst greater than 1 cm in diameter in postmenopausal patient    Essential hypertension    Fibromyalgia    Chronic anxiety    Loose stools    Examination for normal comparison for clinical research    Abscess of right kidney       PLAN       1. Will need to be on Protonix 40mg daily x 8 weeks . Follow up pathology from biopsies of stomach to exclude H. Pylori. 2. Will need a repeat EGD in 6-8 weeks with Dr. Adam Villegas  To confirm healing of the ulcers. 3. Avoid NSAIDs. SUBJECTIVE      Patient seen and examined. 24 hours events and chart reviewed.   (  x )Medications Reviewed ;(   )Old records reviewed    Day 1 of stay.    Feeling: ( []  )Better  ([]   ) Worse      ([x]   )Same     Tolerating regular diet, had one unit of blood , passing brown stool today, however having swelling in lower extremities and SOB while lying down. REVIEW OF SYSTEMS:    GENERAL:  No fever, chills or weight loss. CARDIOVASCULAR:  No chest pain or palpitations. RESPIRATORY:  No  cough.  + SOB today. PHYSICAL EXAMINATION:    In: 1341.7   Out: 900 [Urine:900]  I/O last 3 completed shifts: In: 2680.5 [I.V.:2260.5; Blood:420]  Out: 1400 [Urine:1400]   DIET GENERAL;    Vital Signs  BP (!) 154/76   Pulse 102   Temp 98.3 °F (36.8 °C) (Oral)   Resp 17   Wt 140 lb 12.8 oz (63.9 kg)   SpO2 96%   BMI 25.75 kg/m²     General:   ([x]   )Comfortable      ([]   )Obese          (  [x] )chronically sick looking      other:    HEENT: ( [x]  )Palour(  [x] )No Icterus (   )ET tube in place                      Mucosa: ( x  )moist(   )dry : Other:    CV: ( [x]  )RRR(   [])Irregular/arrhythmias : Murmur: ([x]  ) No   ([]  ) Yes:    Resp: ([x]  ) Otherwise, CTA Bilaterally,[] No added sounds ( []  )Rhonci([]   )Wheeze ([x]   )Rales    Abd: ([x]   )Soft([x]   ) tender right side of the abdomen (  [] )No palpable masses    Ext: ([]   )No edema ( [x]  ) 1+ Bilateral LE Edema ( [x]  )No cyanosis    Skin: ( [x]  )Warm  ( []  )Cold   (   )Dry   ( x  )No rash    Neuro:    ( [x]  )Oriented X 3      ([]  )Confused      ( X  )  Other:  Moves all 4 extremities. REVIEW OF DIAGNOSTIC TESTING AND LABS:      Significant Diagnostic Studies:   8/29/20 EGD  IMPRESSION:      1.  Multiple clean based gastric ulcers in the stomach body/antrum/ pylorus- No active bleeding  . 2.  Biopsies  Taken of the Gastric body/fundus rule out H. pylroi . 3.  Otherwise, normal exam to the second portion of the duodenum  . RECOMMENDATIONS:    1. Discontinue Ibuprofen / NSAIDs. 2. Will need to be on Protonix 40mg daily x 8 weeks . Follow up pathology from biopsies. 3. Will need a repeat EGD in 6-8 weeks with Dr. Diana Oshea  To confirm healing of the ulcers. 4. Resume regular diet. RADIOLOGY:    CT scan 8/29/20- large gallstone.      LABS:      CBC:   Recent Labs     08/28/20 2352 08/29/20  0601 08/29/20 2026 08/30/20  0634   WBC 14.3* 12.6*  --  14.7*   HGB 9.0* 8.2* 10.4* 10.9*    257  --  334       BMP:    Recent Labs     08/28/20  2352 08/29/20  0601    139   K 3.2* 3.7    111   CO2 20* 21*   BUN 13 11   CREATININE 0.6 0.5   GLUCOSE 101 125*       Hepatic Function Panel:    Recent Labs     08/28/20 2352 08/29/20  0601   ALKPHOS 69 55   ALT 17 13   AST 16 13   PROT 5.5* 4.6*   BILITOT 0.3 0.3   BILIDIR <0.2  --    LABALBU 2.7* 2.2*     Amylase and Lipase:  Recent Labs     08/30/20  0634   LACTA 0.7     Lactic Acid:   Recent Labs     08/30/20  0634   LACTA 0.7     Calcium:  Recent Labs     08/29/20  0601   CALCIUM 7.7*     Magnesium:  Recent Labs     08/29/20  0601   MG 1.6     Phosphorus:  Recent Labs     08/29/20  0601   PHOS 2.1*       MEDICATIONS    Scheduled Meds:   citalopram  10 mg Oral Nightly    lisinopril  10 mg Oral Daily    melatonin  4.5 mg Oral Nightly    therapeutic multivitamin-minerals  1 tablet Oral Daily    lactobacillus  1 capsule Oral Daily    sodium chloride flush  10 mL Intravenous 2 times per day    [Held by provider] enoxaparin  40 mg Subcutaneous Daily    tamsulosin  0.4 mg Oral Daily    cefTRIAXone (ROCEPHIN) IV  1 g Intravenous Q24H    potassium replacement protocol   Other RX Placeholder    pantoprazole  40 mg Intravenous Daily    ferrous sulfate  325 mg Oral BID WC     Continuous Infusions:   sodium chloride 125 mL/hr at 08/29/20 2001     PRN Meds:.docusate sodium, polyethylene glycol, sodium chloride flush, acetaminophen **OR** acetaminophen, morphine, HYDROcodone-acetaminophen    Prepared by Fco Mitchell RNC, MS  Patient seen and examined by Uma Nance MD   Note reviewed, updated, and signed by [facility] Curtis Norris MD

## 2020-08-30 NOTE — PROGRESS NOTES
Physician Progress Note      Candelario Tsai  CSN #:                  [id number]  :                       1950  ADMIT DATE:       2020 11:17 PM  [address] [place] DATE:  RESPONDING  PROVIDER #:        Kiera Russell MD          QUERY TEXT:    Dr Jewels Dyson,    Pt admitted with Multifocal abscess collection throughout right kidney   infection favored. Pt noted to have recent sepsis with and insertion of a 6   [place] multilength double-J stent. If possible, please document in progress   notes and discharge summary:    The medical record reflects the following:  Risk Factors: right proximal ureteral calculi with obstruction and UTI with   sepsis treated at [place] [address] from 2020-2020  Clinical Indicators: WBC 14.3, Right kidney Abscess, foci of infection are   favored. Underwent cystoscopy and insertion of a 6 [place] multilength double-J   stent. CT: Multifocal abscess collections are favored throughout the right   kidney. Treatment: IV  0.9 NS 1000 ML bolus, IV Zosyn & IV Vanc    Ezekiel Carlin, RN, BSN  RN Clinical   (P) [phone] (D) [phone]  Options provided:  -- Right kidney Abscess -foci of infection are favored  as an postoperative   complication  -- Right kidney Abscess-foci of infection are favored as an expected/inherent   condition that occurred postoperatively and not a complication  -- Right kidney Abscess-foci of infection are favored  related to other   incidental risk factor (please specify) occurring following surgery and not a   complication  -- Other - I will add my own diagnosis  -- Disagree - Not applicable / Not valid  -- Disagree - Clinically unable to determine / Unknown  -- Refer to Clinical Documentation Reviewer    PROVIDER RESPONSE TEXT:    Patient has Right Kidney Abscess- foci of infection are favored as a   postoperative complication.     Query created by: Cyril Araiza on 2020 10:15 AM      Electronically signed by:  Keiko Miller MD 8/30/2020 12:58 PM

## 2020-08-31 ENCOUNTER — TELEPHONE (OUTPATIENT)
Dept: UROLOGY | Age: 70
End: 2020-08-31

## 2020-08-31 LAB
ANION GAP SERPL CALCULATED.3IONS-SCNC: 14 MEQ/L (ref 8–16)
BUN BLDV-MCNC: 10 MG/DL (ref 7–22)
CALCIUM SERPL-MCNC: 8.5 MG/DL (ref 8.5–10.5)
CHLORIDE BLD-SCNC: 111 MEQ/L (ref 98–111)
CO2: 20 MEQ/L (ref 23–33)
CREAT SERPL-MCNC: 0.5 MG/DL (ref 0.4–1.2)
ERYTHROCYTE [DISTWIDTH] IN BLOOD BY AUTOMATED COUNT: 15.1 % (ref 11.5–14.5)
ERYTHROCYTE [DISTWIDTH] IN BLOOD BY AUTOMATED COUNT: 49.4 FL (ref 35–45)
GFR SERPL CREATININE-BSD FRML MDRD: > 90 ML/MIN/1.73M2
GLUCOSE BLD-MCNC: 80 MG/DL (ref 70–108)
HCT VFR BLD CALC: 30.1 % (ref 37–47)
HEMOGLOBIN: 10.1 GM/DL (ref 12–16)
MCH RBC QN AUTO: 30.4 PG (ref 26–33)
MCHC RBC AUTO-ENTMCNC: 33.6 GM/DL (ref 32.2–35.5)
MCV RBC AUTO: 90.7 FL (ref 81–99)
PLATELET # BLD: 372 THOU/MM3 (ref 130–400)
PMV BLD AUTO: 11.3 FL (ref 9.4–12.4)
POTASSIUM SERPL-SCNC: 3.7 MEQ/L (ref 3.5–5.2)
PROCALCITONIN: 0.22 NG/ML (ref 0.01–0.09)
RBC # BLD: 3.32 MILL/MM3 (ref 4.2–5.4)
SODIUM BLD-SCNC: 145 MEQ/L (ref 135–145)
URINE CULTURE REFLEX: NORMAL
WBC # BLD: 12.6 THOU/MM3 (ref 4.8–10.8)

## 2020-08-31 PROCEDURE — 6360000002 HC RX W HCPCS: Performed by: STUDENT IN AN ORGANIZED HEALTH CARE EDUCATION/TRAINING PROGRAM

## 2020-08-31 PROCEDURE — 6360000002 HC RX W HCPCS: Performed by: FAMILY MEDICINE

## 2020-08-31 PROCEDURE — 99232 SBSQ HOSP IP/OBS MODERATE 35: CPT | Performed by: NURSE PRACTITIONER

## 2020-08-31 PROCEDURE — 94760 N-INVAS EAR/PLS OXIMETRY 1: CPT

## 2020-08-31 PROCEDURE — 6370000000 HC RX 637 (ALT 250 FOR IP): Performed by: STUDENT IN AN ORGANIZED HEALTH CARE EDUCATION/TRAINING PROGRAM

## 2020-08-31 PROCEDURE — 85027 COMPLETE CBC AUTOMATED: CPT

## 2020-08-31 PROCEDURE — 36415 COLL VENOUS BLD VENIPUNCTURE: CPT

## 2020-08-31 PROCEDURE — 80048 BASIC METABOLIC PNL TOTAL CA: CPT

## 2020-08-31 PROCEDURE — 84145 PROCALCITONIN (PCT): CPT

## 2020-08-31 PROCEDURE — 99232 SBSQ HOSP IP/OBS MODERATE 35: CPT | Performed by: FAMILY MEDICINE

## 2020-08-31 PROCEDURE — 2580000003 HC RX 258: Performed by: STUDENT IN AN ORGANIZED HEALTH CARE EDUCATION/TRAINING PROGRAM

## 2020-08-31 PROCEDURE — 51798 US URINE CAPACITY MEASURE: CPT

## 2020-08-31 PROCEDURE — 1200000003 HC TELEMETRY R&B

## 2020-08-31 RX ORDER — FUROSEMIDE 10 MG/ML
20 INJECTION INTRAMUSCULAR; INTRAVENOUS ONCE
Status: COMPLETED | OUTPATIENT
Start: 2020-08-31 | End: 2020-08-31

## 2020-08-31 RX ADMIN — ACETAMINOPHEN 650 MG: 325 TABLET ORAL at 21:48

## 2020-08-31 RX ADMIN — CEFTRIAXONE SODIUM 1 G: 1 INJECTION, POWDER, FOR SOLUTION INTRAMUSCULAR; INTRAVENOUS at 09:28

## 2020-08-31 RX ADMIN — Medication 1 CAPSULE: at 09:28

## 2020-08-31 RX ADMIN — MULTIPLE VITAMINS W/ MINERALS TAB 1 TABLET: TAB at 09:28

## 2020-08-31 RX ADMIN — FERROUS SULFATE TAB 325 MG (65 MG ELEMENTAL FE) 325 MG: 325 (65 FE) TAB at 09:28

## 2020-08-31 RX ADMIN — LISINOPRIL 10 MG: 10 TABLET ORAL at 17:31

## 2020-08-31 RX ADMIN — PANTOPRAZOLE SODIUM 40 MG: 40 TABLET, DELAYED RELEASE ORAL at 05:35

## 2020-08-31 RX ADMIN — FERROUS SULFATE TAB 325 MG (65 MG ELEMENTAL FE) 325 MG: 325 (65 FE) TAB at 17:31

## 2020-08-31 RX ADMIN — SODIUM CHLORIDE, PRESERVATIVE FREE 10 ML: 5 INJECTION INTRAVENOUS at 09:28

## 2020-08-31 RX ADMIN — Medication 4.5 MG: at 21:48

## 2020-08-31 RX ADMIN — ACETAMINOPHEN 650 MG: 325 TABLET ORAL at 15:16

## 2020-08-31 RX ADMIN — FUROSEMIDE 20 MG: 10 INJECTION, SOLUTION INTRAMUSCULAR; INTRAVENOUS at 11:42

## 2020-08-31 RX ADMIN — CITALOPRAM 10 MG: 20 TABLET, FILM COATED ORAL at 21:48

## 2020-08-31 RX ADMIN — TAMSULOSIN HYDROCHLORIDE 0.4 MG: 0.4 CAPSULE ORAL at 09:28

## 2020-08-31 RX ADMIN — SODIUM CHLORIDE, PRESERVATIVE FREE 10 ML: 5 INJECTION INTRAVENOUS at 21:00

## 2020-08-31 ASSESSMENT — PAIN SCALES - GENERAL
PAINLEVEL_OUTOF10: 0
PAINLEVEL_OUTOF10: 0
PAINLEVEL_OUTOF10: 3
PAINLEVEL_OUTOF10: 0
PAINLEVEL_OUTOF10: 3
PAINLEVEL_OUTOF10: 3

## 2020-08-31 ASSESSMENT — PAIN DESCRIPTION - PAIN TYPE
TYPE: ACUTE PAIN
TYPE: ACUTE PAIN

## 2020-08-31 ASSESSMENT — PAIN DESCRIPTION - DESCRIPTORS
DESCRIPTORS: ACHING
DESCRIPTORS: ACHING

## 2020-08-31 ASSESSMENT — PAIN - FUNCTIONAL ASSESSMENT: PAIN_FUNCTIONAL_ASSESSMENT: ACTIVITIES ARE NOT PREVENTED

## 2020-08-31 ASSESSMENT — PAIN DESCRIPTION - ONSET: ONSET: ON-GOING

## 2020-08-31 ASSESSMENT — PAIN DESCRIPTION - LOCATION
LOCATION: HEAD
LOCATION: HEAD

## 2020-08-31 ASSESSMENT — PAIN DESCRIPTION - FREQUENCY: FREQUENCY: INTERMITTENT

## 2020-08-31 ASSESSMENT — PAIN DESCRIPTION - PROGRESSION: CLINICAL_PROGRESSION: NOT CHANGED

## 2020-08-31 ASSESSMENT — PAIN DESCRIPTION - ORIENTATION: ORIENTATION: RIGHT

## 2020-08-31 NOTE — PROGRESS NOTES
Hospitalist Progress Note      Patient:  Swapnil Peters    Unit/Bed:6K-16/016-A  YOB: 1950  MRN: [id number]   Acct: [de-identified]   PCP: Milton Rodriguez MD  Date of Admission: 8/28/2020    Assessment and Plan:        --Right renal abscesses following the placement of a stent for obstructing stone--> urology wanted to treat conservatively. Currently on ceftriaxone for urine culture as of 8/22/2020. New urine culture in process. Currently afebrile with no night sweats. 8/31/2020: Urology wanted the patient to be on antibiotic for 2 weeks. We will discharge tomorrow on cefdinir when improved for total 2 weeks. --Melena status post EGD as of 8/29/2020: Has multiple gastric ulcers per EGD. Biopsy been obtained. Continue Protonix 40 mg daily for 8 weeks peer GI recommendations and repeat EGD in 6 to 8 weeks and to follow-up with them as an outpatient and to avoid NSAIDs.  --E. coli UTI: As mentioned in #1 we will discharge on cefdinir tomorrow. --Acute hypoxic respiratory failure postprocedure/resolved: Patient was hypoxic earlier today required 2 L nasal cannula oxygen, we discontinued IV fluid in addition to that we gave 1 dose of 20 mg Lasix IV, patient improved and currently on room air satting 98%. 8/31/120: Mentioned she felt slightly dyspneic when laying down. Looks like this is a remnant from fluid overload after the procedure. We will give another dose of Lasix 20 mg today, monitor closely, overall improved. --Atelectasis: Incentive spirometry while awake  --Slight leukocytosis: Patient remained afebrile with no signs of infection. Likely reactional due to recent procedure. Monitor closely, repeat CBC in a.m.  8/31/2020: White blood cell count improving, procalcitonin trending down. Remained afebrile in past 24 hours. Monitor closely.     PMH, PSH, SH, FHX reviewed in chart review snap shot in EPIC    CC: Right renal abscess following the placement of stent for obstructing stone    HPI: Initial admission HPI by admitting physician reviewed as below:  Ms. Willard Thomas is a 68year old female with a past medical history of HTN, Fibromyalgia, Frequent Kidney stones (with lithotripsy for treatment), IBS, and BCA who presented to the ER for increased Right flank pain and fever. Sim Pino states she was admitted last week for a kidney infection and a Kidney stone and had a ureteral stent placed on 8/21.  She was discharged with Augmentin and Flomax and was scheduled to have an outpatient visit with Urology on this coming Tuesday.  She stated that for the past few days, she has continued to run a low grade fever which she has treated with Tylenol, and developed increasing right flank and lower back pain.  She states that the pain has become so intense she has developed nausea, but hasn't vomited yet.  She denies chills, chest pain, shortness of breath, dysuria, burning of urination, or blood in her urine.  She does state new onset diarrhea, but feels in it a side-effect of the antibiotics she was on, which she was treating with imodium. For further details and updates please refer to assessment and plan at the beginning of the note. ROS (10 point review of systems completed. Pertinent positives noted.  Otherwise ROS is negative) : Slight dyspnea  PMH:  Per HPI and reviewed in the chart  SHX: Reviewed in the chart, also the patient  FHX: Reviewed in the chart, also with the patient  Allergies: Reviewed in the chart  Medications:       pantoprazole  40 mg Oral QAM AC    citalopram  10 mg Oral Nightly    lisinopril  10 mg Oral Daily    melatonin  4.5 mg Oral Nightly    therapeutic multivitamin-minerals  1 tablet Oral Daily    lactobacillus  1 capsule Oral Daily    sodium chloride flush  10 mL Intravenous 2 times per day    [Held by provider] enoxaparin  40 mg Subcutaneous Daily    tamsulosin  0.4 mg Oral Daily    cefTRIAXone (ROCEPHIN) IV  1 g Intravenous Q24H    potassium replacement protocol   Other RX Placeholder    ferrous sulfate  325 mg Oral BID WC   Subjective 8/31/2020: Said having slight dyspnea when she lay down. Had a fluid overloaded because of IV fluid was running after the procedure. Overall improved. She will be discharged on cefdinir for 2 weeks total.  We will give 1 dose of Lasix today. Urology on the case. Nursing notes, labs, vitals reviewed. Vital Signs:   Vitals reviewed and compared with the previous ones  BP (!) 143/89   Pulse 97   Temp 98.9 °F (37.2 °C) (Oral)   Resp 16   Wt 137 lb 3.2 oz (62.2 kg)   SpO2 94%   BMI 25.09 kg/m²      Intake/Output Summary (Last 24 hours) at 8/31/2020 1714  Last data filed at 8/31/2020 1515  Gross per 24 hour   Intake 1006.89 ml   Output 2050 ml   Net -1043.11 ml        General:   Age-appropriate, in no acute distress  HEENT:  normocephalic and atraumatic. No scleral icterus. PERRLA. Neck: supple. No thyromegaly. Lungs: Left lower base crackles appreciated, no wheeziness. Cardiac: S1, S2, RRR without murmur. No JVD. Abdomen: soft. Nontender. Bowel sounds positive. Extremities:  No clubbing, cyanosis, or edema in all extremities. Vasculature: capillary refill < 3 seconds. Pedal pulses intact bilaterally. Skin:  warm and dry. Not clammy. Psych:  Alert to time, person and place. Communicable. Affect appropriate  Lymph:  No supraclavicular ,and no anterior cervical lymphadenopathy. Neurologic:  No focal deficit. CN II-XII grossly intact. Motor and Sensory capacity intact in all extremities.     Labs:   Recent Labs     08/29/20  0601 08/29/20 2026 08/30/20  0634 08/31/20  0637   WBC 12.6*  --  14.7* 12.6*   HGB 8.2* 10.4* 10.9* 10.1*   HCT 25.8* 32.5* 32.9* 30.1*     --  334 372     Recent Labs     08/29/20  0601 08/30/20  0634 08/31/20  0637    144 145   K 3.7 3.7 3.7    115* 111   CO2 21* 19* 20*   BUN 11 9 10   CREATININE 0.5 0.5 0.5   CALCIUM 7.7* 8.3* 8.5   PHOS 2.1*  --   --      Recent Labs     08/28/20  2352 08/29/20  0601   AST 16 13   ALT 17 13   BILIDIR <0.2  --    BILITOT 0.3 0.3   ALKPHOS 69 55     No results for input(s): INR in the last 72 hours. No results for input(s): Faina Boo in the last 72 hours. Microbiology:    Blood culture #1:   Lab Results   Component Value Date    BC No growth-preliminary  08/28/2020       Blood culture #2:No results found for: [place] [facility]    Organism:  Lab Results   Component Value Date    ORG Escherichia coli 08/22/2020       No results found for: LABGRAM    MRSA culture only:No results found for: [facility]    Urine culture:   Lab Results   Component Value Date    LABURIN Bureau count: <10,000 CFU/mL  08/22/2020       Respiratory culture: No results found for: CULTRESP    Aerobic and Anaerobic :  No results found for: LABAERO  No results found for: LABANAE    Urinalysis:      Lab Results   Component Value Date    NITRU NEGATIVE 08/29/2020    WBCUA 25-50W/CLUMPS 08/29/2020    BACTERIA NONE SEEN 08/29/2020    RBCUA 5-10 08/29/2020    BLOODU MODERATE 08/29/2020    GLUCOSEU NEGATIVE 08/29/2020       Radiology:  XR CHEST PORTABLE   Final Result   Minimal subsegmental atelectasis            **This report has been created using voice recognition software. It may contain minor errors which are inherent in voice recognition technology. **      Final report electronically signed by Dr. Eugene Jason on 8/30/2020 2:28 PM      CT ABDOMEN PELVIS W IV CONTRAST Additional Contrast? None   Final Result   Multifocal abscess collections are favored throughout the right kidney. Small to moderate bilateral effusions. Large gallstone. **This report has been created using voice recognition software. It may contain minor errors which are inherent in voice recognition technology. **      Final report electronically signed by Dr. Nika Segura on 8/29/2020 2:37 AM        Ct Abdomen Pelvis W Iv Contrast Additional Contrast? None    Result Date: 8/29/2020  PROCEDURE: CT ABDOMEN PELVIS W IV CONTRAST CLINICAL INFORMATION: right flank pain. . COMPARISON: Outside CT 8/20/2020 TECHNIQUE: 5 mm axial CT images were obtained through the abdomen and pelvis after the administration of intravenous and oral contrast. Coronal and sagittal reconstructions were obtained. All CT scans at this facility use dose modulation, iterative reconstruction, and/or weight-based dosing when appropriate to reduce radiation dose to as low as reasonably achievable. FINDINGS Lung base: Small to moderate bilateral pleural effusions have developed, right greater than left. Liver and spleen: Multiple well-circumscribed cystic foci throughout the liver. Biliary: 2 cm densely calcified gallstone. Pancreas: head, body, and tail unremarkable. Adrenals: symmetric, no calcifications or masses. Kidneys: 4 mm stone within the left renal pelvis. Left renal pelvis is mildly dilated and there is mild stranding surrounding it. There is a right ureteral stent. Innumerable areas of diminished enhancement are seen throughout the right kidney. They do not contain gas though foci of infection are favored. Many of these are irregularly shaped rather than spherical like cysts. Largest of these measures 4.4 cm x 1.7 cm. Within the right kidney is a 7 mm stone. Aorta: normal caliber. Lymph Nodes: normal size. Bowel: 4 cm hiatal hernia. It is moderately thickened stomach appears mildly thickened Bladder: Normal Reproductive: 3.8 cm cystic focus right pelvis suspicious for right ovarian cyst. Mild free fluid. Bones: normal for age. Multifocal abscess collections are favored throughout the right kidney. Small to moderate bilateral effusions. Large gallstone. **This report has been created using voice recognition software. It may contain minor errors which are inherent in voice recognition technology. ** Final report electronically signed by Dr. Jefferson Warren on 8/29/2020 2:37 AM      Discussed plan with

## 2020-08-31 NOTE — FLOWSHEET NOTE
Initial Spiritual Care Contact:     Vikash Perdomo is a 68year old female who is in bed on 6k. She welcomed this  into her room. No family was present at this time. Vikash Perdomo stated she is feeling much better than before. She agreed to prayer. 08/31/20 1010   Encounter Summary   Services provided to: Patient   Referral/Consult From: Rounding   Place of [address] Completed   Continue Visiting Yes  (8/31)   Complexity of Encounter Moderate   Length of Encounter 15 minutes   Spiritual/Nondenominational   Type Spiritual support   Care Plan:  Continue spiritual and emotional care for patient and family. Including prayers.

## 2020-08-31 NOTE — CARE COORDINATION
20, 9:47 AM EDT  DISCHARGE PLANNING EVALUATION:    Xiang Duarte       Admitted from: ED  2020/ [facility] day: 2   Location: [facility] Reason for admit: Renal abscess, right [N15.1] Status: IP  Admit order signed?: yes  PMH:  has a past medical history of Anxiety, Breast cancer ([address] Fibromyalgia, Hyperlipidemia, Hypertension, IBS (irritable bowel syndrome), Kidney stones, Nausea & vomiting, and Prolonged emergence from general anesthesia. Procedure:  EGD  Pertinent abnormal Imagin/29  CT abdomen pelvis  Multifocal abscess collections are favored throughout the right kidney. Small to moderate bilateral effusions. Large gallstone.   CXR  Minimal subsegmental atelectasis   Medications:  Scheduled Meds:   pantoprazole  40 mg Oral QAM AC    citalopram  10 mg Oral Nightly    lisinopril  10 mg Oral Daily    melatonin  4.5 mg Oral Nightly    therapeutic multivitamin-minerals  1 tablet Oral Daily    lactobacillus  1 capsule Oral Daily    sodium chloride flush  10 mL Intravenous 2 times per day    [Held by provider] enoxaparin  40 mg Subcutaneous Daily    tamsulosin  0.4 mg Oral Daily    cefTRIAXone (ROCEPHIN) IV  1 g Intravenous Q24H    potassium replacement protocol   Other RX Placeholder    ferrous sulfate  325 mg Oral BID WC     Continuous Infusions:   Pertinent Info/Orders/Treatment Plan: To ED with Abdominal Pain and Fever 1.2 cm right proximal ureteral calculi with obstruction and UTI with sepsis treated at [place] [address] from 2020-2020 when she underwent cystoscopy and insertion of a 6 [place] multilength double-J stent urology.    ,    GI consulted, PPI at discharge, no NSAIDS, Rocephin IV, probiotic, urology and GI consulted, monitor all stools for melena, no fever, now on room air, Lasix x1, IS, pain control  Diet: DIET GENERAL;   Smoking status:  reports that she quit smoking about 47 years ago. She has a 3.00 pack-year smoking history.  She has never used smokeless tobacco.   PCP: Kori Enamorado MD  Readmission 30 days or less: yes  Readmission Risk Score: 14%    Discharge Planning Evaluation  Current Residence:  Private Residence  Living Arrangements:  Spouse/Significant Other   Support Systems:  Children, Spouse/Significant Other  Current Services PTA:     Potential Assistance Needed:  N/A  Potential Assistance Purchasing Medications:  No  Does patient want to participate in local refill/ meds to beds program?  No  Type of Home Care Services:  None  Patient expects to be discharged to:  home  Expected Discharge date:  08/31/20  Follow Up Appointment: Best Day/ Time: Monday AM    Patient Goals/Plan/Treatment Preferences: Met with Abbey Dave; she resides home with her spouse. She has PCP, drives pta, uses walker, and feels she will not need in-home services or HH. She plans follow up with urology and PCP. Transportation/Food Security/Housekeeping Addressed:  No issues identified.     Evaluation: no

## 2020-08-31 NOTE — TELEPHONE ENCOUNTER
Pt currently in hospital. Needs fu in 3 weeks with Dr Phoebe Mcclendon. Cancel tomorrows appt with him.      Thank you

## 2020-08-31 NOTE — PROGRESS NOTES
LABALBU 2.2 08/29/2020    CREATININE 0.5 08/31/2020    CREATININE 1.0 11/20/2013    CALCIUM 8.5 08/31/2020    LABGLOM >90 08/31/2020     CT with contrast:    Renal abscesses following placement of a stent for obstructing stone. Several small foci with no dominant foci minimal to percutaneous drainage. Patient not showing signs of sepsis. Impression:    Renal abscess  S/p right ureteral stent placement 8-21  Left sided stone in renal pelvis  Melena    Plan:    Afebrile over night. WBC trending down. Feeling better since admission, no flank pain or back pain  Voiding ok, doesn't feel like she is emptying  Obtain PVR    Continue antibiotics at discharge for 2 weeks  Will change fu to 3 weeks with Dr Jesusita Saez.        MANUEL Fountain  08/31/20 3:31 PM  Urology

## 2020-09-01 VITALS
SYSTOLIC BLOOD PRESSURE: 165 MMHG | BODY MASS INDEX: 24.98 KG/M2 | WEIGHT: 136.6 LBS | TEMPERATURE: 98.8 F | DIASTOLIC BLOOD PRESSURE: 85 MMHG | HEART RATE: 90 BPM | OXYGEN SATURATION: 94 % | RESPIRATION RATE: 16 BRPM

## 2020-09-01 LAB
BASOPHILS # BLD: 0.4 %
BASOPHILS ABSOLUTE: 0 THOU/MM3 (ref 0–0.1)
EOSINOPHIL # BLD: 0.9 %
EOSINOPHILS ABSOLUTE: 0.1 THOU/MM3 (ref 0–0.4)
ERYTHROCYTE [DISTWIDTH] IN BLOOD BY AUTOMATED COUNT: 14.9 % (ref 11.5–14.5)
ERYTHROCYTE [DISTWIDTH] IN BLOOD BY AUTOMATED COUNT: 49.1 FL (ref 35–45)
HCT VFR BLD CALC: 29.3 % (ref 37–47)
HEMOGLOBIN: 9.7 GM/DL (ref 12–16)
IMMATURE GRANS (ABS): 0.07 THOU/MM3 (ref 0–0.07)
IMMATURE GRANULOCYTES: 0.6 %
LYMPHOCYTES # BLD: 8.7 %
LYMPHOCYTES ABSOLUTE: 1 THOU/MM3 (ref 1–4.8)
MCH RBC QN AUTO: 30.1 PG (ref 26–33)
MCHC RBC AUTO-ENTMCNC: 33.1 GM/DL (ref 32.2–35.5)
MCV RBC AUTO: 91 FL (ref 81–99)
MONOCYTES # BLD: 5.5 %
MONOCYTES ABSOLUTE: 0.6 THOU/MM3 (ref 0.4–1.3)
NUCLEATED RED BLOOD CELLS: 0 /100 WBC
PLATELET # BLD: 359 THOU/MM3 (ref 130–400)
PMV BLD AUTO: 11.1 FL (ref 9.4–12.4)
RBC # BLD: 3.22 MILL/MM3 (ref 4.2–5.4)
SEG NEUTROPHILS: 83.9 %
SEGMENTED NEUTROPHILS ABSOLUTE COUNT: 9.7 THOU/MM3 (ref 1.8–7.7)
WBC # BLD: 11.6 THOU/MM3 (ref 4.8–10.8)

## 2020-09-01 PROCEDURE — 99239 HOSP IP/OBS DSCHRG MGMT >30: CPT | Performed by: FAMILY MEDICINE

## 2020-09-01 PROCEDURE — 6370000000 HC RX 637 (ALT 250 FOR IP): Performed by: STUDENT IN AN ORGANIZED HEALTH CARE EDUCATION/TRAINING PROGRAM

## 2020-09-01 PROCEDURE — 2580000003 HC RX 258: Performed by: STUDENT IN AN ORGANIZED HEALTH CARE EDUCATION/TRAINING PROGRAM

## 2020-09-01 PROCEDURE — 36415 COLL VENOUS BLD VENIPUNCTURE: CPT

## 2020-09-01 PROCEDURE — 6370000000 HC RX 637 (ALT 250 FOR IP): Performed by: FAMILY MEDICINE

## 2020-09-01 PROCEDURE — 94760 N-INVAS EAR/PLS OXIMETRY 1: CPT

## 2020-09-01 PROCEDURE — 99232 SBSQ HOSP IP/OBS MODERATE 35: CPT | Performed by: UROLOGY

## 2020-09-01 PROCEDURE — 85025 COMPLETE CBC W/AUTO DIFF WBC: CPT

## 2020-09-01 RX ORDER — CEFDINIR 300 MG/1
300 CAPSULE ORAL EVERY 12 HOURS SCHEDULED
Status: DISCONTINUED | OUTPATIENT
Start: 2020-09-01 | End: 2020-09-01 | Stop reason: HOSPADM

## 2020-09-01 RX ORDER — FERROUS SULFATE 325(65) MG
325 TABLET ORAL 2 TIMES DAILY WITH MEALS
Qty: 60 TABLET | Refills: 0 | Status: SHIPPED | OUTPATIENT
Start: 2020-09-01 | End: 2020-11-09

## 2020-09-01 RX ORDER — FUROSEMIDE 20 MG/1
10 TABLET ORAL DAILY
Qty: 7 TABLET | Refills: 0 | Status: SHIPPED | OUTPATIENT
Start: 2020-09-01 | End: 2020-09-01 | Stop reason: SDUPTHER

## 2020-09-01 RX ORDER — PANTOPRAZOLE SODIUM 20 MG/1
40 TABLET, DELAYED RELEASE ORAL DAILY
Qty: 30 TABLET | Refills: 0 | Status: SHIPPED | OUTPATIENT
Start: 2020-09-01 | End: 2020-11-09

## 2020-09-01 RX ORDER — FUROSEMIDE 20 MG/1
10 TABLET ORAL DAILY
Qty: 4 TABLET | Refills: 0 | Status: ON HOLD | OUTPATIENT
Start: 2020-09-01 | End: 2020-09-30

## 2020-09-01 RX ORDER — CEFDINIR 300 MG/1
300 CAPSULE ORAL EVERY 12 HOURS SCHEDULED
Qty: 20 CAPSULE | Refills: 0 | Status: SHIPPED | OUTPATIENT
Start: 2020-09-01 | End: 2020-09-11

## 2020-09-01 RX ORDER — CEFDINIR 300 MG/1
300 CAPSULE ORAL EVERY 12 HOURS SCHEDULED
Qty: 20 CAPSULE | Refills: 0 | Status: SHIPPED | OUTPATIENT
Start: 2020-09-01 | End: 2020-09-01

## 2020-09-01 RX ORDER — FERROUS SULFATE 325(65) MG
325 TABLET ORAL 2 TIMES DAILY WITH MEALS
Qty: 60 TABLET | Refills: 0 | Status: SHIPPED | OUTPATIENT
Start: 2020-09-01 | End: 2020-09-01

## 2020-09-01 RX ADMIN — FERROUS SULFATE TAB 325 MG (65 MG ELEMENTAL FE) 325 MG: 325 (65 FE) TAB at 08:19

## 2020-09-01 RX ADMIN — DOCUSATE SODIUM 100 MG: 100 CAPSULE, LIQUID FILLED ORAL at 08:19

## 2020-09-01 RX ADMIN — ACETAMINOPHEN 650 MG: 325 TABLET ORAL at 11:29

## 2020-09-01 RX ADMIN — MULTIPLE VITAMINS W/ MINERALS TAB 1 TABLET: TAB at 08:19

## 2020-09-01 RX ADMIN — CEFDINIR 300 MG: 300 CAPSULE ORAL at 10:38

## 2020-09-01 RX ADMIN — TAMSULOSIN HYDROCHLORIDE 0.4 MG: 0.4 CAPSULE ORAL at 08:20

## 2020-09-01 RX ADMIN — SODIUM CHLORIDE, PRESERVATIVE FREE 10 ML: 5 INJECTION INTRAVENOUS at 08:20

## 2020-09-01 RX ADMIN — Medication 1 CAPSULE: at 08:20

## 2020-09-01 RX ADMIN — PANTOPRAZOLE SODIUM 40 MG: 40 TABLET, DELAYED RELEASE ORAL at 06:15

## 2020-09-01 ASSESSMENT — PAIN SCALES - GENERAL: PAINLEVEL_OUTOF10: 3

## 2020-09-01 NOTE — PROGRESS NOTES
CLINICAL PHARMACY: DISCHARGE MED RECONCILIATION/REVIEW    [facility] ([facility]) Select Patient?: No  Total # of Interventions Recommended: 0  Total # Interventions Accepted: 0  Time Spent (min): 15    Ariel WaddellD, BCPS  9/1/2020  10:04 AM

## 2020-09-01 NOTE — DISCHARGE SUMMARY
Currently afebrile with no night sweats. 8/31/2020: Urology wanted the patient to be on antibiotic for 2 weeks. We will discharge tomorrow on cefdinir when improved for total 2 weeks. --Melena status post EGD as of 8/29/2020: Has multiple gastric ulcers per EGD.  Biopsy been obtained.  Continue Protonix 40 mg daily for 8 weeks peer GI recommendations and repeat EGD in 6 to 8 weeks and to follow-up with them as an outpatient and to avoid NSAIDs.  --E. coli UTI: As mentioned in #1 we will discharge on cefdinir tomorrow. --Acute hypoxic respiratory failure postprocedure/resolved: Patient was hypoxic earlier today required 2 L nasal cannula oxygen, we discontinued IV fluid in addition to that we gave 1 dose of 20 mg Lasix IV, patient improved and currently on room air satting 98%. 8/31/120: Mentioned she felt slightly dyspneic when laying down. Looks like this is a remnant from fluid overload after the procedure. We will give another dose of Lasix 20 mg today, monitor closely, overall improved. --Atelectasis: Incentive spirometry while awake  --Slight leukocytosis: Patient remained afebrile with no signs of infection.  Likely reactional due to recent procedure.  Monitor closely, repeat CBC in a.m.  8/31/2020: White blood cell count improving, procalcitonin trending down. Remained afebrile in past 24 hours. Monitor closely.     PMH, PSH, SH, FHX reviewed in chart review snap shot in EPIC    Additional discharge final recommendations as below:-  At the day of discharge patient improved totally, we prescribed cefdinir 300 mg twice daily for 10 days in addition to probiotics. Also Lasix 10 mg daily for 7 days to help with fluid overload status which improved more than 90%. Patient to follow-up with her urologist as an outpatient in addition to primary care physician and other specialist as scheduled. Patient instructed to continue use her incentive spirometry because of atelectasis which improved.   Instructed to repeat EGD in 6 to 8 weeks per GI recommendations and to take Protonix daily 40 mg.  Prescription sent to the patient pharmacy. Discharge Nurse Juan R Lucas RN ) note at the time of discharge as below:-  Discharge instructions discussed with patient. No concerns voiced. Discharged home with . All appointments obtained for the patient at the day of discharge with other specialities including PCP in one week. All questions and concerns addressed. Patient verbalized understandings and was agreeable with discharge planning. Physical Exam:-  Vitals:   Patient Vitals for the past 24 hrs:   BP Temp Temp src Pulse Resp SpO2 Weight   09/01/20 1247 -- -- -- -- -- 94 % --   09/01/20 0800 (!) 165/85 98.8 °F (37.1 °C) Oral 90 16 95 % --   09/01/20 0325 (!) 144/82 98.3 °F (36.8 °C) Oral 87 16 95 % 136 lb 9.6 oz (62 kg)   08/31/20 2351 (!) 132/98 98 °F (36.7 °C) Oral 81 16 94 % --   08/31/20 2136 (!) 158/81 99.1 °F (37.3 °C) Oral 80 16 98 % --   08/31/20 1500 (!) 143/89 98.9 °F (37.2 °C) Oral 97 16 94 % --     Weight:   Weight: 136 lb 9.6 oz (62 kg)   24 hour intake/output:     Intake/Output Summary (Last 24 hours) at 9/1/2020 1325  Last data filed at 9/1/2020 0459  Gross per 24 hour   Intake 966.89 ml   Output 1940 ml   Net -973.11 ml       1. General appearance: No apparent distress, appears stated age and cooperative. 2. HEENT: Normal cephalic, atraumatic without obvious deformity. Pupils equal, round, and reactive to light. Extra ocular muscles intact. Conjunctivae/corneas clear. 3. Neck: Supple, with full range of motion. No jugular venous distention. Trachea midline. 4. Respiratory:  Normal respiratory effort. Slight left crackles appreciated. No wheeziness. 5. Cardiovascular: Regular rate and rhythm with normal S1/S2 without murmurs, rubs or gallops. 6. Abdomen: Soft, increased abdominal girth, non-tender, normal bowel sounds.   7. Musculoskeletal:  No clubbing, cyanosis or edema bilaterally. 8. Skin: Skin color, texture, turgor normal.  No rashes or lesions. 9. Neurologic:  Neurovascularly intact without any focal sensory/motor deficits. Cranial nerves: II-XII intact, grossly non-focal.  10. Psychiatric: Alert and oriented, thought content appropriate, normal insight  11. Capillary Refill: Brisk,< 3 seconds   12. Peripheral Pulses: +2 palpable, equal bilaterally       Discharge Medications:-      Medication List      START taking these medications    cefdinir 300 MG capsule  Commonly known as:  OMNICEF  Take 1 capsule by mouth every 12 hours for 10 days     ferrous sulfate 325 (65 Fe) MG tablet  Commonly known as:  IRON 325  Take 1 tablet by mouth 2 times daily (with meals)     furosemide 20 MG tablet  Commonly known as:  Lasix  Take 0.5 tablets by mouth daily for 7 days        CONTINUE taking these medications    citalopram 10 MG tablet  Commonly known as:  CELEXA     docusate sodium 100 MG capsule  Commonly known as:  Colace  Take 1 capsule by mouth daily as needed for Constipation     lisinopril 10 MG tablet  Commonly known as:  PRINIVIL;ZESTRIL     melatonin 5 MG Tabs tablet     polyethylene glycol 17 g packet  Commonly known as:  GLYCOLAX  Take 17 g by mouth daily as needed for Constipation     PROBIOTIC DAILY PO     tamsulosin 0.4 MG capsule  Commonly known as:  FLOMAX  Take 1 capsule by mouth daily     therapeutic multivitamin-minerals tablet     UNABLE TO FIND        STOP taking these medications    amoxicillin-clavulanate 875-125 MG per tablet  Commonly known as:   Augmentin     BOOST PO     fenofibrate 145 MG tablet  Commonly known as:  TRICOR     LOTRONEX PO     Phosphatidylserine 100 MG Caps     Potassium 99 MG Tabs           Where to Get Your Medications      These medications were sent to [address], [address] [facility], [address]    Phone:  [phone]   · cefdinir 300 MG capsule  · ferrous sulfate 325 (65 Fe) MG tablet  · furosemide 20 MG tablet          Labs :-  Recent Results (from the past 72 hour(s))   Hemoglobin and hematocrit, blood    Collection Time: 08/29/20  8:26 PM   Result Value Ref Range    Hemoglobin 10.4 (L) 12.0 - 16.0 gm/dl    Hematocrit 32.5 (L) 37.0 - 47.0 %   CBC auto differential    Collection Time: 08/30/20  6:34 AM   Result Value Ref Range    WBC 14.7 (H) 4.8 - 10.8 thou/mm3    RBC 3.58 (L) 4.20 - 5.40 mill/mm3    Hemoglobin 10.9 (L) 12.0 - 16.0 gm/dl    Hematocrit 32.9 (L) 37.0 - 47.0 %    MCV 91.9 81.0 - 99.0 fL    MCH 30.4 26.0 - 33.0 pg    MCHC 33.1 32.2 - 35.5 gm/dl    RDW-CV 15.4 (H) 11.5 - 14.5 %    RDW-SD 51.4 (H) 35.0 - 45.0 fL    Platelets 722 310 - 466 thou/mm3    MPV 11.6 9.4 - 12.4 fL    Seg Neutrophils 85.1 %    Lymphocytes 8.8 %    Monocytes 4.4 %    Eosinophils 0.7 %    Basophils 0.3 %    Immature Granulocytes 0.7 %    Segs Absolute 12.5 (H) 1.8 - 7.7 thou/mm3    Lymphocytes Absolute 1.3 1.0 - 4.8 thou/mm3    Monocytes Absolute 0.6 0.4 - 1.3 thou/mm3    Eosinophils Absolute 0.1 0.0 - 0.4 thou/mm3    Basophils Absolute 0.0 0.0 - 0.1 thou/mm3    Immature Grans (Abs) 0.11 (H) 0.00 - 0.07 thou/mm3    nRBC 0 /100 wbc   Basic Metabolic Panel w/ Reflex to MG    Collection Time: 08/30/20  6:34 AM   Result Value Ref Range    Sodium 144 135 - 145 meq/L    Potassium reflex Magnesium 3.7 3.5 - 5.2 meq/L    Chloride 115 (H) 98 - 111 meq/L    CO2 19 (L) 23 - 33 meq/L    Glucose 84 70 - 108 mg/dL    BUN 9 7 - 22 mg/dL    CREATININE 0.5 0.4 - 1.2 mg/dL    Calcium 8.3 (L) 8.5 - 10.5 mg/dL   Lactic acid, plasma    Collection Time: 08/30/20  6:34 AM   Result Value Ref Range    Lactic Acid 0.7 0.5 - 2.2 mmol/L   Anion Gap    Collection Time: 08/30/20  6:34 AM   Result Value Ref Range    Anion Gap 10.0 8.0 - 16.0 meq/L   Glomerular Filtration Rate, Estimated    Collection Time: 08/30/20  6:34 AM   Result Value Ref Range    Est, Glom Filt Rate >90 ml/min/1.73m2 Procalcitonin    Collection Time: 08/30/20  6:34 AM   Result Value Ref Range    Procalcitonin 0.25 (H) 0.01 - 0.09 ng/mL   Procalcitonin    Collection Time: 08/31/20  6:37 AM   Result Value Ref Range    Procalcitonin 0.22 (H) 0.01 - 0.09 ng/mL   CBC    Collection Time: 08/31/20  6:37 AM   Result Value Ref Range    WBC 12.6 (H) 4.8 - 10.8 thou/mm3    RBC 3.32 (L) 4.20 - 5.40 mill/mm3    Hemoglobin 10.1 (L) 12.0 - 16.0 gm/dl    Hematocrit 30.1 (L) 37.0 - 47.0 %    MCV 90.7 81.0 - 99.0 fL    MCH 30.4 26.0 - 33.0 pg    MCHC 33.6 32.2 - 35.5 gm/dl    RDW-CV 15.1 (H) 11.5 - 14.5 %    RDW-SD 49.4 (H) 35.0 - 45.0 fL    Platelets 497 571 - 608 thou/mm3    MPV 11.3 9.4 - 12.4 fL   Basic Metabolic Panel    Collection Time: 08/31/20  6:37 AM   Result Value Ref Range    Sodium 145 135 - 145 meq/L    Potassium 3.7 3.5 - 5.2 meq/L    Chloride 111 98 - 111 meq/L    CO2 20 (L) 23 - 33 meq/L    Glucose 80 70 - 108 mg/dL    BUN 10 7 - 22 mg/dL    CREATININE 0.5 0.4 - 1.2 mg/dL    Calcium 8.5 8.5 - 10.5 mg/dL   Anion Gap    Collection Time: 08/31/20  6:37 AM   Result Value Ref Range    Anion Gap 14.0 8.0 - 16.0 meq/L   Glomerular Filtration Rate, Estimated    Collection Time: 08/31/20  6:37 AM   Result Value Ref Range    Est, Glom Filt Rate >90 ml/min/1.73m2   CBC auto differential    Collection Time: 09/01/20  6:10 AM   Result Value Ref Range    WBC 11.6 (H) 4.8 - 10.8 thou/mm3    RBC 3.22 (L) 4.20 - 5.40 mill/mm3    Hemoglobin 9.7 (L) 12.0 - 16.0 gm/dl    Hematocrit 29.3 (L) 37.0 - 47.0 %    MCV 91.0 81.0 - 99.0 fL    MCH 30.1 26.0 - 33.0 pg    MCHC 33.1 32.2 - 35.5 gm/dl    RDW-CV 14.9 (H) 11.5 - 14.5 %    RDW-SD 49.1 (H) 35.0 - 45.0 fL    Platelets 883 761 - 531 thou/mm3    MPV 11.1 9.4 - 12.4 fL    Seg Neutrophils 83.9 %    Lymphocytes 8.7 %    Monocytes 5.5 %    Eosinophils 0.9 %    Basophils 0.4 %    Immature Granulocytes 0.6 %    Segs Absolute 9.7 (H) 1.8 - 7.7 thou/mm3    Lymphocytes Absolute 1.0 1.0 - 4.8 thou/mm3 Monocytes Absolute 0.6 0.4 - 1.3 thou/mm3    Eosinophils Absolute 0.1 0.0 - 0.4 thou/mm3    Basophils Absolute 0.0 0.0 - 0.1 thou/mm3    Immature Grans (Abs) 0.07 0.00 - 0.07 thou/mm3    nRBC 0 /100 wbc        Microbiology:    Blood culture #1:   Lab Results   Component Value Date    BC No growth-preliminary  08/28/2020       Blood culture #2:No results found for: Georgina Pacheco    Organism:  No results found for: LABGRAM    MRSA culture only:No results found for: [facility]    Urine culture:   Lab Results   Component Value Date    LABURIN [place] count: <10,000 CFU/mL  08/22/2020     Lab Results   Component Value Date    ORG Escherichia coli 08/22/2020        Respiratory culture: No results found for: CULTRESP    Aerobic and Anaerobic :  No results found for: LABAERO  No results found for: LABANAE    Urinalysis:      Lab Results   Component Value Date    NITRU NEGATIVE 08/29/2020    WBCUA 25-50W/CLUMPS 08/29/2020    BACTERIA NONE SEEN 08/29/2020    RBCUA 5-10 08/29/2020    BLOODU MODERATE 08/29/2020    GLUCOSEU NEGATIVE 08/29/2020       Radiology:-  Ct Abdomen Pelvis W Iv Contrast Additional Contrast? None    Result Date: 8/29/2020  PROCEDURE: CT ABDOMEN PELVIS W IV CONTRAST CLINICAL INFORMATION: right flank pain. . COMPARISON: Outside CT 8/20/2020 TECHNIQUE: 5 mm axial CT images were obtained through the abdomen and pelvis after the administration of intravenous and oral contrast. Coronal and sagittal reconstructions were obtained. All CT scans at this facility use dose modulation, iterative reconstruction, and/or weight-based dosing when appropriate to reduce radiation dose to as low as reasonably achievable. FINDINGS Lung base: Small to moderate bilateral pleural effusions have developed, right greater than left. Liver and spleen: Multiple well-circumscribed cystic foci throughout the liver. Biliary: 2 cm densely calcified gallstone. Pancreas: head, body, and tail unremarkable.  Adrenals: symmetric, no calcifications or masses. Kidneys: 4 mm stone within the left renal pelvis. Left renal pelvis is mildly dilated and there is mild stranding surrounding it. There is a right ureteral stent. Innumerable areas of diminished enhancement are seen throughout the right kidney. They do not contain gas though foci of infection are favored. Many of these are irregularly shaped rather than spherical like cysts. Largest of these measures 4.4 cm x 1.7 cm. Within the right kidney is a 7 mm stone. Aorta: normal caliber. Lymph Nodes: normal size. Bowel: 4 cm hiatal hernia. It is moderately thickened stomach appears mildly thickened Bladder: Normal Reproductive: 3.8 cm cystic focus right pelvis suspicious for right ovarian cyst. Mild free fluid. Bones: normal for age. Multifocal abscess collections are favored throughout the right kidney. Small to moderate bilateral effusions. Large gallstone. **This report has been created using voice recognition software. It may contain minor errors which are inherent in voice recognition technology. ** Final report electronically signed by Dr. Yusra Mattson on 8/29/2020 2:37 AM       Follow-up scheduled after discharge :-    today with Erika Soriano MD  in the next few days     Consultations during this hospital stay:-  [] NONE [] Cardiology  [] Nephrology  [] Hemo onco   [x] GI   [] ID  [] Endocrine  [] Pulm    [] Neuro    [] Psych   [x] Urology  [] ENT   [] G SURGERY   []Ortho    []CV surg    [] Palliative  [] Hospice [] Pain management   []    []TCU   [] PT/OT  OTHERS:-    Disposition: home  Condition at Discharge: Stable    Discharge Medications:      [facility] Medication Instructions ZZI:[id number]    Printed on:09/01/20 4911   Medication Information                      cefdinir (OMNICEF) 300 MG capsule  Take 1 capsule by mouth every 12 hours for 10 days             citalopram (CELEXA) 10 MG tablet  Take 10 mg by mouth nightly             docusate sodium (COLACE) 100 MG capsule  Take 1 capsule by mouth daily as needed for Constipation             ferrous sulfate (IRON 325) 325 (65 Fe) MG tablet  Take 1 tablet by mouth 2 times daily (with meals)             furosemide (LASIX) 20 MG tablet  Take 0.5 tablets by mouth daily for 7 days             lisinopril (PRINIVIL;ZESTRIL) 10 MG tablet  Take 10 mg by mouth daily             melatonin 5 MG TABS tablet  Take 5 mg by mouth nightly             Multiple Vitamins-Minerals (THERAPEUTIC MULTIVITAMIN-MINERALS) tablet  Take 1 tablet by mouth daily. polyethylene glycol (GLYCOLAX) 17 g packet  Take 17 g by mouth daily as needed for Constipation             Probiotic Product (PROBIOTIC DAILY PO)  Take by mouth daily PROBIO 5             tamsulosin (FLOMAX) 0.4 MG capsule  Take 1 capsule by mouth daily             UNABLE TO FIND  Take 4 tablets by mouth daily.  [employer] ambrotose  For fibromyalgia             Protonix 40 mg daily for 8 weeks         Time Spent:- 45 minutes    Electronically signed by Joselin Solis MD on 9/1/2020 at 1:25 PM  Discharging Hospitalist

## 2020-09-01 NOTE — CARE COORDINATION
9/1/20, 11:37 AM EDT      [facility] home with spouse; declines HH. Patient goals/plan/ treatment preferences discussed by  and . Patient goals/plan/ treatment preferences reviewed with patient/ family. Patient/ family verbalize understanding of discharge plan and are in agreement with goal/plan/treatment preferences. Understanding was demonstrated using the teach back method. AVS provided by RN at time of discharge, which includes all necessary medical information pertaining to the patients current course of illness, treatment, post-discharge goals of care, and treatment preferences.         IMM Letter  IMM Letter given to Patient/Family/Significant other/Guardian/POA/by[de-identified] pt access  IMM Letter date given[de-identified] 08/30/20  IMM Letter time given[de-identified] 4272

## 2020-09-01 NOTE — PLAN OF CARE
Problem: Pain:  Goal: Control of acute pain  Description: Control of acute pain  Outcome: Ongoing  Note: Complains of headache of 3, controlled with Tylenol. Problem: Discharge Planning:  Goal: Discharged to appropriate level of care  Description: Discharged to appropriate level of care  Outcome: Ongoing  Note: Planning discharge home with  when medically appropriate. Problem: Bowel Function - Altered:  Goal: Bowel elimination is within specified parameters  Description: Bowel elimination is within specified parameters  Outcome: Ongoing  Note: Loose brown stool today. No diarrhea today. Problem: Cardiac Output - Decreased:  Goal: Hemodynamic stability will improve  Description: Hemodynamic stability will improve  Outcome: Ongoing  Note:    08/29/20 1500   Vital Signs   Temp 99.2 °F (37.3 °C)   Temp Source Oral   Pulse 82   Heart Rate Source Monitor   Resp 16   /64   BP Location Left upper arm   MAP (mmHg) 92   Patient Position Semi fowlers     Vital signs stable. Will continue to assess. Problem: Skin Integrity - Impaired:  Goal: Absence of new skin breakdown  Description: Absence of new skin breakdown  Outcome: Ongoing  Note: No new skin breakdown this shift. Turns self/ambulates frequently. Care plan reviewed with patient and family. Patient and family verbalize understanding of the plan of care and contribute to goal setting.
Problem: Pain:  Goal: Control of acute pain  Description: Control of acute pain  Outcome: Ongoing  Note: Denies pain this shift. Will continue to assess. Problem: Discharge Planning:  Goal: Discharged to appropriate level of care  Description: Discharged to appropriate level of care  Outcome: Ongoing  Note: Planning discharge home with  when medically appropriate. Problem: Bowel Function - Altered:  Goal: Bowel elimination is within specified parameters  Description: Bowel elimination is within specified parameters  Outcome: Ongoing  Note: Normal bowel movements without sign of melena. Problem: Cardiac Output - Decreased:  Goal: Hemodynamic stability will improve  Description: Hemodynamic stability will improve  Outcome: Ongoing  Note:    08/30/20 1154   Vital Signs   Temp 98.1 °F (36.7 °C)   Temp Source Oral   Pulse 86   Heart Rate Source Monitor   Resp 18   BP (!) 152/81   BP Location Left Arm   BP Upper/Lower Upper   MAP (mmHg) 108   Patient Position Semi fowlers     Vital signs stable, will continue to monitor. Problem: Skin Integrity - Impaired:  Goal: Absence of new skin breakdown  Description: Absence of new skin breakdown  Outcome: Ongoing  Note: No new skin breakdown this shift. Turns self/ambulates frequently. Care plan reviewed with patient and family. Patient and family verbalize understanding of the plan of care and contribute to goal setting.
Problem: Pain:  Goal: Pain level will decrease  Description: Pain level will decrease  9/1/2020 0122 by Judit Parikh RN  Outcome: Ongoing  Note: Pt is resting quietly at this time with eyes closed. Will continue to assess using 0-10 pain scale. Tylenol available prn. Problem: Discharge Planning:  Goal: Discharged to appropriate level of care  Description: Discharged to appropriate level of care  9/1/2020 0122 by Judit Parikh RN  Outcome: Ongoing  Note: Pt prefers to return home upon discharge. Problem: Bowel Function - Altered:  Goal: Bowel elimination is within specified parameters  Description: Bowel elimination is within specified parameters  9/1/2020 0122 by Judit Parikh RN  Outcome: Ongoing  Note: Bowel sounds active, no bowel movements so far this shift. Will continue to assess intake and output. Problem: Cardiac Output - Decreased:  Goal: Hemodynamic stability will improve  Description: Hemodynamic stability will improve  9/1/2020 0122 by Judit Parikh RN  Outcome: Ongoing  Note: Pt remains hemodynamically stable at this time, will continue to assess. Refer to flowsheet. Problem: Skin Integrity - Impaired:  Goal: Absence of new skin breakdown  Description: Absence of new skin breakdown  9/1/2020 0122 by Judit Parikh RN  Outcome: Ongoing  Note: No new skin breakdown, pt turns and ambulates well. Problem: Falls - Risk of:  Goal: Will remain free from falls  Description: Will remain free from falls  9/1/2020 0122 by Judit Parikh RN  Outcome: Ongoing  Note: No falls so far this shift, call light and bedside table within reach. Bed in lowest position and alarm on, nonskid socks on bilaterally. Care plan reviewed with patient. Patient verbalize understanding of the plan of care and contribute to goal setting.
Problem: Pain:  Goal: Pain level will decrease  Description: Pain level will decrease  Outcome: Ongoing  Note: Patient rates pain on 0-10 pain scale. States pain is a 0 on 0-10 scale. States goal is 0. Repositioned for comfort. Will continue to reassess. Problem: Discharge Planning:  Goal: Discharged to appropriate level of care  Description: Discharged to appropriate level of care  Outcome: Ongoing  Note: Patient plans to discharge home when medically stable. Problem: Bowel Function - Altered:  Goal: Bowel elimination is within specified parameters  Description: Bowel elimination is within specified parameters  Outcome: Ongoing  Note: Monitoring bowel movements. Stool brown in color and formed. Will continue to reassess. Problem: Cardiac Output - Decreased:  Goal: Hemodynamic stability will improve  Description: Hemodynamic stability will improve  Outcome: Ongoing  Note:   Vitals:    08/31/20 1159   BP:    Pulse:    Resp:    Temp:    SpO2: 96%     Telemetry monitor on. Problem: Skin Integrity - Impaired:  Goal: Absence of new skin breakdown  Description: Absence of new skin breakdown  Outcome: Ongoing  Note: No new skin breakdown noted at this time. Will continue to reassess. Patient turns and repositions self in bed frequently. Problem: Falls - Risk of:  Goal: Will remain free from falls  Description: Will remain free from falls  Outcome: Ongoing  Note: Falling star placed outside of patient's room. 2/4 bed rails up. Non-skid socks provided. Call light and personal items with in reach. Bed alarm on. Care plan reviewed with patient. No concerns voiced.
breakdown this shift. 8/30/2020 1542 by Ivan Arango RN  Outcome: Ongoing  Note: No new skin breakdown this shift. Turns self/ambulates frequently. Problem: Falls - Risk of:  Goal: Will remain free from falls  Outcome: Ongoing  Note: The patient has been free of falls this shift. Bed is in low position, 2/4 rails are up, and alarm is active. Call light is in reach, 2/4 rails are up, and hourly rounding performed. Goal: Absence of physical injury  Outcome: Ongoing   Care plan reviewed with patient. The patient verbalizes understanding and contributed to plan of care.

## 2020-09-02 ENCOUNTER — TELEPHONE (OUTPATIENT)
Dept: UROLOGY | Age: 70
End: 2020-09-02

## 2020-09-02 NOTE — TELEPHONE ENCOUNTER
SURGERY [address]  [address] [place], [place] [address] [facility] [address] Edd Butler, [address]      Phone *[phone] *[phone]   Surgical Scheduling Direct Line Phone *[phone] Fax *[address] 1950 female    [address] [place] [address]   Marital Status:          Home Phone: [phone]      Cell Phone:    Telephone Information:   Mobile [phone]          Surgeon: Dr. Vipul Fuchs  Surgery Date: 9/30/20 Time: 12:00 noon    Procedure: Cystoscopy, right ureteroscopy, laser lithotripsy, basket retrieval of stone fragments, and possible right ureteral stent exchange    Diagnosis: Kidney Stone     Important Medical History: In Epic    Special Inst/Equip:     CPT Codes:    59483     Latex Allergy:  No     Cardiac Device:  No     Anesthesia:  Anesthesiologist (General/Spinal)          Admission Type:  Same Day                             Admit Prior to Day of Surgery: No    Case Location:  Main OR           Preadmission Testing:Phone Call              PAT Date and Time:______________________________________________________    PAT Confirmation #: ______________________________________________________    Post Op Visit: ___________________________________________________________    Need Preop Cardiac Clearance: No    Does Patient have Cardiologist/physician?      None   Just had surgery on 8/21/20    Surgery Confirmation #: __________________________________________________    : ________________________   Date: __________________________     [employer] Name: [employer]

## 2020-09-02 NOTE — TELEPHONE ENCOUNTER
Patient scheduled for surgery wit Dr Telma Ugarte on 9/30/20. Surgery consent on arrival. Patient will need pre op urinalysis and Covid-19 done on 9/23/20. Surgery instructions to be mailed to the patient.

## 2020-09-03 LAB
BLOOD CULTURE, ROUTINE: NORMAL
BLOOD CULTURE, ROUTINE: NORMAL

## 2020-09-18 ENCOUNTER — TELEPHONE (OUTPATIENT)
Dept: UROLOGY | Age: 70
End: 2020-09-18

## 2020-09-23 ENCOUNTER — PREP FOR PROCEDURE (OUTPATIENT)
Dept: UROLOGY | Age: 70
End: 2020-09-23

## 2020-09-23 LAB
BACTERIA, URINE: NORMAL
BILIRUBIN URINE: NEGATIVE
BLOOD, URINE: NORMAL
CASTS: NORMAL /LPF
CLARITY: NORMAL
COLOR, URINE: YELLOW
CRYSTALS: NORMAL
GLUCOSE URINE: NEGATIVE MG/DL
KETONES, URINE: NEGATIVE MG/DL
LEUKOCYTES, UA: NORMAL
MICROSCOPIC URINE: YES
MUCUS, URINE: NORMAL
NITRITE, URINE: NEGATIVE
PH, URINE: 5 (ref 5–8)
PROTEIN, URINE: 100 MG/DL
RBC URINE: >100 /HPF
RENAL EPITHELIAL, UA: NORMAL /HPF
SPECIFIC GRAVITY UA: 1.02 (ref 1.01–1.02)
SQUAMOUS EPITHELIAL: NORMAL /HPF
URINE CULTURE INDICATED: YES
UROBILINOGEN, URINE: 0.2 E.U./DL (ref 0.2–1)
WBC URINE: NORMAL /HPF (ref 0–5)

## 2020-09-23 RX ORDER — SODIUM CHLORIDE 9 MG/ML
INJECTION, SOLUTION INTRAVENOUS CONTINUOUS
Status: CANCELLED | OUTPATIENT
Start: 2020-09-30

## 2020-09-23 NOTE — PROGRESS NOTES
PAT call attempted, patient unavailable, left message to please call us back at your earliest convenience; [phone]

## 2020-09-26 LAB
SARS-COV-2, PCR: NOT DETECTED
URINE CULTURE, ROUTINE: NORMAL

## 2020-09-29 NOTE — PROGRESS NOTES
Patient contacted regarding COVID-19 screen. Following questions asked: In the last month, have you been in contact with someone who was confirmed or suspected to have Coronavirus/COVID-19:  Patient stated NO    Pt was informed only 1  visitor allowed. Please bring masks. Do you or family members have any of the following symptoms:  Cough-not new  Muscle pain-no   Shortness of breath-no   Fever-no   Weakness-no  Severe headache-no   Sore throat-no   Respiratory symptoms-no    Have you traveled internationally in the last month?  No     Have you been to the emergency room recently-no

## 2020-09-29 NOTE — H&P
Douglas Cotton MD  History and Physical    Patient:  Ross Albarran  MRN: [id number]  YOB: 1950    HISTORY OF PRESENT ILLNESS:     The patient is a 68 y.o. female who presents with right UPJ stone. Here for procedure. Patient's old records, notes and chart reviewed and summarized above. Douglas Cotton MD independently reviewed the images and verified the radiology reports from:    Ct Abdomen Pelvis W Iv Contrast Additional Contrast? None    Result Date: 8/29/2020  PROCEDURE: CT ABDOMEN PELVIS W IV CONTRAST CLINICAL INFORMATION: right flank pain. . COMPARISON: Outside CT 8/20/2020 TECHNIQUE: 5 mm axial CT images were obtained through the abdomen and pelvis after the administration of intravenous and oral contrast. Coronal and sagittal reconstructions were obtained. All CT scans at this facility use dose modulation, iterative reconstruction, and/or weight-based dosing when appropriate to reduce radiation dose to as low as reasonably achievable. FINDINGS Lung base: Small to moderate bilateral pleural effusions have developed, right greater than left. Liver and spleen: Multiple well-circumscribed cystic foci throughout the liver. Biliary: 2 cm densely calcified gallstone. Pancreas: head, body, and tail unremarkable. Adrenals: symmetric, no calcifications or masses. Kidneys: 4 mm stone within the left renal pelvis. Left renal pelvis is mildly dilated and there is mild stranding surrounding it. There is a right ureteral stent. Innumerable areas of diminished enhancement are seen throughout the right kidney. They do not contain gas though foci of infection are favored. Many of these are irregularly shaped rather than spherical like cysts. Largest of these measures 4.4 cm x 1.7 cm. Within the right kidney is a 7 mm stone. Aorta: normal caliber. Lymph Nodes: normal size. Bowel: 4 cm hiatal hernia.  It is moderately thickened stomach appears mildly thickened Bladder: Normal Reproductive: 3.8 cm cystic focus right pelvis suspicious for right ovarian cyst. Mild free fluid. Bones: normal for age. Multifocal abscess collections are favored throughout the right kidney. Small to moderate bilateral effusions. Large gallstone. **This report has been created using voice recognition software. It may contain minor errors which are inherent in voice recognition technology. ** Final report electronically signed by Dr. Lupe Dye on 8/29/2020 2:37 AM        Past Medical History:    Past Medical History:   Diagnosis Date    Anxiety     Breast cancer ([address]     Fibromyalgia     Hyperlipidemia     Hypertension     IBS (irritable bowel syndrome)     Kidney stones     Nausea & vomiting     Prolonged emergence from general anesthesia        Past Surgical History:    Past Surgical History:   Procedure Laterality Date   [facility]   2007    CYSTOSCOPY  4/27/16    Right Ureteroscopy Laser Lithotripsy Basket Retrieval of Stone Fragments Right Ureteral Stent Placement, Left Ureteroscopy Basket Retrieval of Stone Left Ureteral Stent Placement - Dr. Gamez Pew Right 8/21/2020    CYSTOSCOPY, RIGHT  URETERAL STENT INSERTION performed by Janet Ballard MD at [facility] Left 12/02/2013    HOLMIUM LASER LITHOTRIPSY, BASKET EXTRACTION  STENT INSERTION     DILATION AND CURETTAGE OF UTERUS      x3    HYSTERECTOMY  1997    LITHOTRIPSY      OTHER SURGICAL HISTORY      samuel?  UPPER GASTROINTESTINAL ENDOSCOPY Left 8/29/2020    EGD BIOPSY performed by Sita Colunga MD at [facility]    URETEROSCOPY         Medications Prior to Admission:    Prior to Admission medications    Medication Sig Start Date End Date Taking?  Authorizing Provider   ferrous sulfate (IRON 325) 325 (65 Fe) MG tablet Take 1 tablet by mouth 2 times daily (with meals) 9/1/20   Cristina Holstein, MD   furosemide (LASIX) 20 MG tablet Take Stress: Not on file   Relationships    Social connections     Talks on phone: Not on file     Gets together: Not on file     Attends Mandaeism service: Not on file     Active member of club or organization: Not on file     Attends meetings of clubs or organizations: Not on file     Relationship status: Not on file    Intimate partner violence     Fear of current or ex partner: Not on file     Emotionally abused: Not on file     Physically abused: Not on file     Forced sexual activity: Not on file   Other Topics Concern    Not on file   Social History Narrative    Not on file       Family History:    Family History   Problem Relation Age of Onset    Arthritis Mother     Cancer Mother     Stroke Mother     Heart Disease Father     Cancer Maternal Aunt     Stroke Maternal Grandmother     Cancer Maternal Grandfather     Heart Disease Paternal Grandmother        REVIEW OF SYSTEMS:  Constitutional: negative  Eyes: negative  Respiratory: negative  Cardiovascular: negative  Gastrointestinal: negative  Genitourinary: no acute issues  Musculoskeletal: negative  Skin: negative   Neurological: negative  Hematological/Lymphatic: negative  Psychological: negative    Physical Exam:      No data found. Constitutional: Patient in no acute distress; Neuro: alert and oriented to person place and time. Psych: Mood and affect normal.  Skin: Normal  Lungs: Respiratory effort normal, CTA  Cardiovascular:  Normal peripheral pulses; no murmur. Normal rhythm  Abdomen: Soft, non-tender, non-distended with no CVA, flank pain, hepatosplenomegaly or hernia. Kidneys normal.  Bladder non-tender and not distended. LABS:   No results for input(s): WBC, HGB, HCT, MCV, PLT in the last 72 hours. No results for input(s): NA, K, CL, CO2, PHOS, BUN, CREATININE in the last 72 hours.     Invalid input(s): CA  No results found for: PSA      Urinalysis: No results for input(s): COLORU, PHUR, LABCAST, WBCUA, RBCUA, MUCUS, TRICHOMONAS, YEAST, BACTERIA, CLARITYU, SPECGRAV, LEUKOCYTESUR, UROBILINOGEN, Fredie Blight in the last 72 hours. Invalid input(s): NITRATE, GLUCOSEUKETONESUAMORPHOUS     -----------------------------------------------------------------      Assessment and Plan     Impression:    Patient Active Problem List   Diagnosis    Kidney stone    ARF (acute renal failure) ([address]    Sepsis ([address]    Pyohydronephrosis    Pyelonephritis    Hypotension    Acute kidney injury ([address]    Obstructive uropathy    History of breast cancer    Simple adnexal cyst greater than 1 cm in diameter in postmenopausal patient    Essential hypertension    Fibromyalgia    Chronic anxiety    Loose stools    Examination for normal comparison for clinical research    Abscess of right kidney    Acute respiratory failure with hypoxia ([address]    Melena    Ureteral stone with hydronephrosis    Urinary tract infection with hematuria    Urinary tract infection without hematuria       Plan:     Consent obtained; right ureteroscopy, treatment/removal of right UPJ stone, cystoscopy with stent exchange in OR.     Timi Engle MD  2:11 PM 9/29/2020

## 2020-09-30 ENCOUNTER — ANESTHESIA (OUTPATIENT)
Dept: OPERATING ROOM | Age: 70
End: 2020-09-30
Payer: MEDICARE

## 2020-09-30 ENCOUNTER — HOSPITAL ENCOUNTER (OUTPATIENT)
Age: 70
Setting detail: OUTPATIENT SURGERY
Discharge: HOME | End: 2020-09-30
Attending: UROLOGY | Admitting: UROLOGY
Payer: MEDICARE

## 2020-09-30 ENCOUNTER — ANESTHESIA EVENT (OUTPATIENT)
Dept: OPERATING ROOM | Age: 70
End: 2020-09-30
Payer: MEDICARE

## 2020-09-30 VITALS
BODY MASS INDEX: 20.8 KG/M2 | OXYGEN SATURATION: 94 % | TEMPERATURE: 98 F | RESPIRATION RATE: 16 BRPM | WEIGHT: 113 LBS | HEART RATE: 105 BPM | HEIGHT: 62 IN | SYSTOLIC BLOOD PRESSURE: 121 MMHG | DIASTOLIC BLOOD PRESSURE: 57 MMHG

## 2020-09-30 VITALS — DIASTOLIC BLOOD PRESSURE: 68 MMHG | SYSTOLIC BLOOD PRESSURE: 137 MMHG | OXYGEN SATURATION: 100 %

## 2020-09-30 PROBLEM — N15.1 ABSCESS OF RIGHT KIDNEY: Status: RESOLVED | Noted: 2020-08-29 | Resolved: 2020-09-30

## 2020-09-30 PROBLEM — N17.9 ARF (ACUTE RENAL FAILURE) (HCC): Status: RESOLVED | Noted: 2020-08-20 | Resolved: 2020-09-30

## 2020-09-30 LAB — POTASSIUM SERPL-SCNC: 3.9 MEQ/L (ref 3.5–5.2)

## 2020-09-30 PROCEDURE — 7100000000 HC PACU RECOVERY - FIRST 15 MIN: Performed by: UROLOGY

## 2020-09-30 PROCEDURE — 2580000003 HC RX 258: Performed by: UROLOGY

## 2020-09-30 PROCEDURE — 3700000000 HC ANESTHESIA ATTENDED CARE: Performed by: UROLOGY

## 2020-09-30 PROCEDURE — 6360000002 HC RX W HCPCS: Performed by: REGISTERED NURSE

## 2020-09-30 PROCEDURE — 3700000001 HC ADD 15 MINUTES (ANESTHESIA): Performed by: UROLOGY

## 2020-09-30 PROCEDURE — 2500000003 HC RX 250 WO HCPCS: Performed by: REGISTERED NURSE

## 2020-09-30 PROCEDURE — 2709999900 HC NON-CHARGEABLE SUPPLY: Performed by: UROLOGY

## 2020-09-30 PROCEDURE — C1769 GUIDE WIRE: HCPCS | Performed by: UROLOGY

## 2020-09-30 PROCEDURE — 2720000010 HC SURG SUPPLY STERILE: Performed by: UROLOGY

## 2020-09-30 PROCEDURE — 6370000000 HC RX 637 (ALT 250 FOR IP)

## 2020-09-30 PROCEDURE — C1758 CATHETER, URETERAL: HCPCS | Performed by: UROLOGY

## 2020-09-30 PROCEDURE — 6360000002 HC RX W HCPCS

## 2020-09-30 PROCEDURE — 3600000013 HC SURGERY LEVEL 3 ADDTL 15MIN: Performed by: UROLOGY

## 2020-09-30 PROCEDURE — 7100000011 HC PHASE II RECOVERY - ADDTL 15 MIN: Performed by: UROLOGY

## 2020-09-30 PROCEDURE — 6360000002 HC RX W HCPCS: Performed by: UROLOGY

## 2020-09-30 PROCEDURE — 2500000003 HC RX 250 WO HCPCS: Performed by: ANESTHESIOLOGY

## 2020-09-30 PROCEDURE — 3600000003 HC SURGERY LEVEL 3 BASE: Performed by: UROLOGY

## 2020-09-30 PROCEDURE — 52353 CYSTOURETERO W/LITHOTRIPSY: CPT | Performed by: UROLOGY

## 2020-09-30 PROCEDURE — 84132 ASSAY OF SERUM POTASSIUM: CPT

## 2020-09-30 PROCEDURE — 36415 COLL VENOUS BLD VENIPUNCTURE: CPT

## 2020-09-30 PROCEDURE — 6360000002 HC RX W HCPCS: Performed by: ANESTHESIOLOGY

## 2020-09-30 PROCEDURE — 7100000010 HC PHASE II RECOVERY - FIRST 15 MIN: Performed by: UROLOGY

## 2020-09-30 PROCEDURE — 7100000001 HC PACU RECOVERY - ADDTL 15 MIN: Performed by: UROLOGY

## 2020-09-30 RX ORDER — ONDANSETRON 2 MG/ML
INJECTION INTRAMUSCULAR; INTRAVENOUS PRN
Status: DISCONTINUED | OUTPATIENT
Start: 2020-09-30 | End: 2020-09-30 | Stop reason: SDUPTHER

## 2020-09-30 RX ORDER — OXYCODONE HYDROCHLORIDE AND ACETAMINOPHEN 5; 325 MG/1; MG/1
TABLET ORAL
Status: COMPLETED
Start: 2020-09-30 | End: 2020-09-30

## 2020-09-30 RX ORDER — DEXAMETHASONE SODIUM PHOSPHATE 4 MG/ML
INJECTION, SOLUTION INTRA-ARTICULAR; INTRALESIONAL; INTRAMUSCULAR; INTRAVENOUS; SOFT TISSUE PRN
Status: DISCONTINUED | OUTPATIENT
Start: 2020-09-30 | End: 2020-09-30 | Stop reason: SDUPTHER

## 2020-09-30 RX ORDER — OXYCODONE HYDROCHLORIDE AND ACETAMINOPHEN 5; 325 MG/1; MG/1
1 TABLET ORAL ONCE
Status: COMPLETED | OUTPATIENT
Start: 2020-09-30 | End: 2020-09-30

## 2020-09-30 RX ORDER — FENTANYL CITRATE 50 UG/ML
50 INJECTION, SOLUTION INTRAMUSCULAR; INTRAVENOUS EVERY 5 MIN PRN
Status: DISCONTINUED | OUTPATIENT
Start: 2020-09-30 | End: 2020-09-30 | Stop reason: HOSPADM

## 2020-09-30 RX ORDER — LABETALOL 20 MG/4 ML (5 MG/ML) INTRAVENOUS SYRINGE
5 EVERY 10 MIN PRN
Status: DISCONTINUED | OUTPATIENT
Start: 2020-09-30 | End: 2020-09-30 | Stop reason: HOSPADM

## 2020-09-30 RX ORDER — LIDOCAINE HCL/PF 100 MG/5ML
SYRINGE (ML) INJECTION PRN
Status: DISCONTINUED | OUTPATIENT
Start: 2020-09-30 | End: 2020-09-30 | Stop reason: SDUPTHER

## 2020-09-30 RX ORDER — FENTANYL CITRATE 50 UG/ML
25 INJECTION, SOLUTION INTRAMUSCULAR; INTRAVENOUS EVERY 5 MIN PRN
Status: DISCONTINUED | OUTPATIENT
Start: 2020-09-30 | End: 2020-09-30 | Stop reason: HOSPADM

## 2020-09-30 RX ORDER — FENTANYL CITRATE 50 UG/ML
INJECTION, SOLUTION INTRAMUSCULAR; INTRAVENOUS PRN
Status: DISCONTINUED | OUTPATIENT
Start: 2020-09-30 | End: 2020-09-30 | Stop reason: SDUPTHER

## 2020-09-30 RX ORDER — CEFUROXIME AXETIL 250 MG/1
250 TABLET ORAL 2 TIMES DAILY
Qty: 2 TABLET | Refills: 0 | Status: SHIPPED | OUTPATIENT
Start: 2020-09-30 | End: 2020-10-01

## 2020-09-30 RX ORDER — MEPERIDINE HYDROCHLORIDE 25 MG/ML
12.5 INJECTION INTRAMUSCULAR; INTRAVENOUS; SUBCUTANEOUS EVERY 5 MIN PRN
Status: DISCONTINUED | OUTPATIENT
Start: 2020-09-30 | End: 2020-09-30 | Stop reason: HOSPADM

## 2020-09-30 RX ORDER — PROPOFOL 10 MG/ML
INJECTION, EMULSION INTRAVENOUS PRN
Status: DISCONTINUED | OUTPATIENT
Start: 2020-09-30 | End: 2020-09-30 | Stop reason: SDUPTHER

## 2020-09-30 RX ORDER — SODIUM CHLORIDE 9 MG/ML
INJECTION, SOLUTION INTRAVENOUS CONTINUOUS
Status: DISCONTINUED | OUTPATIENT
Start: 2020-09-30 | End: 2020-09-30 | Stop reason: HOSPADM

## 2020-09-30 RX ORDER — HYDRALAZINE HYDROCHLORIDE 20 MG/ML
10 INJECTION INTRAMUSCULAR; INTRAVENOUS EVERY 10 MIN PRN
Status: DISCONTINUED | OUTPATIENT
Start: 2020-09-30 | End: 2020-09-30 | Stop reason: HOSPADM

## 2020-09-30 RX ORDER — MIDAZOLAM HYDROCHLORIDE 1 MG/ML
INJECTION INTRAMUSCULAR; INTRAVENOUS PRN
Status: DISCONTINUED | OUTPATIENT
Start: 2020-09-30 | End: 2020-09-30 | Stop reason: SDUPTHER

## 2020-09-30 RX ORDER — LABETALOL 20 MG/4 ML (5 MG/ML) INTRAVENOUS SYRINGE
10 ONCE
Status: COMPLETED | OUTPATIENT
Start: 2020-09-30 | End: 2020-09-30

## 2020-09-30 RX ORDER — ONDANSETRON 2 MG/ML
4 INJECTION INTRAMUSCULAR; INTRAVENOUS
Status: DISCONTINUED | OUTPATIENT
Start: 2020-09-30 | End: 2020-09-30 | Stop reason: HOSPADM

## 2020-09-30 RX ORDER — HYDRALAZINE HYDROCHLORIDE 20 MG/ML
INJECTION INTRAMUSCULAR; INTRAVENOUS
Status: COMPLETED
Start: 2020-09-30 | End: 2020-09-30

## 2020-09-30 RX ADMIN — SODIUM CHLORIDE: 9 INJECTION, SOLUTION INTRAVENOUS at 10:54

## 2020-09-30 RX ADMIN — Medication 100 MG: at 13:05

## 2020-09-30 RX ADMIN — HYDRALAZINE HYDROCHLORIDE 10 MG: 20 INJECTION, SOLUTION INTRAMUSCULAR; INTRAVENOUS at 14:50

## 2020-09-30 RX ADMIN — FENTANYL CITRATE 50 MCG: 50 INJECTION, SOLUTION INTRAMUSCULAR; INTRAVENOUS at 14:30

## 2020-09-30 RX ADMIN — SODIUM CHLORIDE: 9 INJECTION, SOLUTION INTRAVENOUS at 12:16

## 2020-09-30 RX ADMIN — LABETALOL 20 MG/4 ML (5 MG/ML) INTRAVENOUS SYRINGE 5 MG: at 14:10

## 2020-09-30 RX ADMIN — FENTANYL CITRATE 100 MCG: 50 INJECTION, SOLUTION INTRAMUSCULAR; INTRAVENOUS at 13:05

## 2020-09-30 RX ADMIN — DEXAMETHASONE SODIUM PHOSPHATE 10 MG: 4 INJECTION, SOLUTION INTRAMUSCULAR; INTRAVENOUS at 13:05

## 2020-09-30 RX ADMIN — LABETALOL 20 MG/4 ML (5 MG/ML) INTRAVENOUS SYRINGE 10 MG: at 14:20

## 2020-09-30 RX ADMIN — FENTANYL CITRATE 50 MCG: 50 INJECTION, SOLUTION INTRAMUSCULAR; INTRAVENOUS at 15:10

## 2020-09-30 RX ADMIN — OXYCODONE HYDROCHLORIDE AND ACETAMINOPHEN 1 TABLET: 5; 325 TABLET ORAL at 15:15

## 2020-09-30 RX ADMIN — ONDANSETRON HYDROCHLORIDE 4 MG: 4 INJECTION, SOLUTION INTRAMUSCULAR; INTRAVENOUS at 13:05

## 2020-09-30 RX ADMIN — SODIUM CHLORIDE: 9 INJECTION, SOLUTION INTRAVENOUS at 14:15

## 2020-09-30 RX ADMIN — CEFAZOLIN 2 G: 10 INJECTION, POWDER, FOR SOLUTION INTRAVENOUS at 13:07

## 2020-09-30 RX ADMIN — HYDRALAZINE HYDROCHLORIDE 10 MG: 20 INJECTION INTRAMUSCULAR; INTRAVENOUS at 14:50

## 2020-09-30 RX ADMIN — PROPOFOL 200 MG: 10 INJECTION, EMULSION INTRAVENOUS at 13:05

## 2020-09-30 ASSESSMENT — PULMONARY FUNCTION TESTS
PIF_VALUE: 1
PIF_VALUE: 2
PIF_VALUE: 0
PIF_VALUE: 1
PIF_VALUE: 2
PIF_VALUE: 1
PIF_VALUE: 1
PIF_VALUE: 2
PIF_VALUE: 2
PIF_VALUE: 1
PIF_VALUE: 1
PIF_VALUE: 2
PIF_VALUE: 1
PIF_VALUE: 3
PIF_VALUE: 1
PIF_VALUE: 1
PIF_VALUE: 2
PIF_VALUE: 2
PIF_VALUE: 1
PIF_VALUE: 1
PIF_VALUE: 2
PIF_VALUE: 27
PIF_VALUE: 1
PIF_VALUE: 2
PIF_VALUE: 1
PIF_VALUE: 1
PIF_VALUE: 2
PIF_VALUE: 1
PIF_VALUE: 2
PIF_VALUE: 3
PIF_VALUE: 1
PIF_VALUE: 2
PIF_VALUE: 2
PIF_VALUE: 1
PIF_VALUE: 3
PIF_VALUE: 1
PIF_VALUE: 1

## 2020-09-30 ASSESSMENT — PAIN DESCRIPTION - PROGRESSION
CLINICAL_PROGRESSION: RAPIDLY WORSENING
CLINICAL_PROGRESSION: RAPIDLY IMPROVING
CLINICAL_PROGRESSION: GRADUALLY IMPROVING

## 2020-09-30 ASSESSMENT — PAIN SCALES - GENERAL
PAINLEVEL_OUTOF10: 4
PAINLEVEL_OUTOF10: 0
PAINLEVEL_OUTOF10: 2
PAINLEVEL_OUTOF10: 2
PAINLEVEL_OUTOF10: 7
PAINLEVEL_OUTOF10: 0
PAINLEVEL_OUTOF10: 7
PAINLEVEL_OUTOF10: 6
PAINLEVEL_OUTOF10: 2

## 2020-09-30 ASSESSMENT — PAIN DESCRIPTION - PAIN TYPE: TYPE: SURGICAL PAIN

## 2020-09-30 ASSESSMENT — PAIN - FUNCTIONAL ASSESSMENT: PAIN_FUNCTIONAL_ASSESSMENT: 0-10

## 2020-09-30 NOTE — PROGRESS NOTES
Pt returned to [facility] room 4. Vitals and assessment as charted. 0.9 infusing, @950ml to count from PACU. Pt has applesauce and water. Family at the bedside. Pt and family verbalized understanding of discharge criteria and call light use. Call light in reach.

## 2020-09-30 NOTE — ANESTHESIA POSTPROCEDURE EVALUATION
Department of Anesthesiology  Postprocedure Note    Patient: Moshe Gonzalez  MRN: [id number]  YOB: 1950  Date of evaluation: 9/30/2020  Time:  3:19 PM     Procedure Summary     Date:  09/30/20 Room / Location:  [facility] [place] [facility]    Anesthesia Start:  1673 Anesthesia Stop:  0353    Procedure:  CYSTOSCOPY, RIGHT URETEROSCOPY, LASER LITHOTROISPY, BASKET RETRIEVAL OF STONE FRAGMENTS (Right ) Diagnosis:  (KIDNEY STONE)    Surgeon:  Deepa Galvez MD Responsible Provider:  Mao Wen DO    Anesthesia Type:  general ASA Status:  3          Anesthesia Type: general    Alonso Phase I: Alonso Score: 8    Alonso Phase II:      Last vitals: Reviewed and per EMR flowsheets.        Anesthesia Post Evaluation    Patient location during evaluation: PACU  Patient participation: complete - patient participated  Level of consciousness: awake and alert  Pain score: 2  Airway patency: patent  Nausea & Vomiting: no nausea and no vomiting  Complications: no  Cardiovascular status: hemodynamically stable and blood pressure returned to baseline  Respiratory status: spontaneous ventilation, room air and acceptable  Hydration status: stable

## 2020-09-30 NOTE — ANESTHESIA PRE PROCEDURE
Department of Anesthesiology  Preprocedure Note       Name:  Tre Adamson   Age:  68 y.o.  :  1950                                          MRN:  [id number]         Date:  2020      Surgeon: Romelia Markham):  Liliana Troncoso MD    Procedure: Procedure(s):  CYSTOSCOPY, RIGHT URETEROSCOPY, LASER LITHOTROISPY, BASKET RETRIEVAL OF STONE FRAGMENTS, AND POSSIBLE RIGHT URETERAL STENT EXCHANGE    Medications prior to admission:   Prior to Admission medications    Medication Sig Start Date End Date Taking? Authorizing Provider   ferrous sulfate (IRON 325) 325 (65 Fe) MG tablet Take 1 tablet by mouth 2 times daily (with meals) 20  Yes Lobo Bhardwaj MD   pantoprazole (PROTONIX) 20 MG tablet Take 2 tablets by mouth daily 20  Yes Lobo Bhardwaj MD   tamsulosin (FLOMAX) 0.4 MG capsule Take 1 capsule by mouth daily 20  Yes Karla Coburn MD   citalopram (CELEXA) 10 MG tablet Take 10 mg by mouth nightly   Yes Historical Provider, MD   lisinopril (PRINIVIL;ZESTRIL) 10 MG tablet Take 10 mg by mouth daily   Yes Historical Provider, MD   docusate sodium (COLACE) 100 MG capsule Take 1 capsule by mouth daily as needed for Constipation 16  Yes Milly Drake MD   melatonin 5 MG TABS tablet Take 5 mg by mouth nightly   Yes Historical Provider, MD   Probiotic Product (PROBIOTIC DAILY PO) Take by mouth daily PROBIO 5   Yes Historical Provider, MD   Multiple Vitamins-Minerals (THERAPEUTIC MULTIVITAMIN-MINERALS) tablet Take 1 tablet by mouth daily. Yes Historical Provider, MD   UNABLE TO FIND Take 4 tablets by mouth daily.  [employer] ambrotose  For fibromyalgia   Yes Historical Provider, MD       Current medications:    Current Facility-Administered Medications   Medication Dose Route Frequency Provider Last Rate Last Dose    0.9 % sodium chloride infusion   Intravenous Continuous Liliana Troncoso  mL/hr at 20 1054      ceFAZolin (ANCEF) 2 g in dextrose 5 % 50 mL IVPB  2 g Intravenous 30 Min Padmaja Yoo MD           Allergies:     Allergies   Allergen Reactions    Asa [Aspirin] Other (See Comments)     jasmyne       Problem List:    Patient Active Problem List   Diagnosis Code    Kidney stone N20.0    ARF (acute renal failure) ([address] N17.9    Sepsis ([address] A41.9    Pyohydronephrosis N13.6    Pyelonephritis N12    Hypotension I95.9    Acute kidney injury ([address] N17.9    Obstructive uropathy N13.9    History of breast cancer Z85.3    Simple adnexal cyst greater than 1 cm in diameter in postmenopausal patient N94.89, Z78.0    Essential hypertension I10    Fibromyalgia M79.7    Chronic anxiety F41.9    Loose stools R19.5    Examination for normal comparison for clinical research Z00.6    Abscess of right kidney N15.1    Acute respiratory failure with hypoxia (HCC) J96.01    Melena K92.1    Ureteral stone with hydronephrosis N13.2    Urinary tract infection with hematuria N39.0, R31.9    Urinary tract infection without hematuria N39.0       Past Medical History:        Diagnosis Date    Anxiety     Breast cancer ([address]     Fibromyalgia     Hyperlipidemia     Hypertension     IBS (irritable bowel syndrome)     Kidney stones     Nausea & vomiting     Prolonged emergence from general anesthesia        Past Surgical History:        Procedure Laterality Date    APPENDECTOMY  1958    BREAST LUMPECTOMY  1998   [place] [id number]    COLONOSCOPY  2007    CYSTOSCOPY  4/27/16    Right Ureteroscopy Laser Lithotripsy Basket Retrieval of Stone Fragments Right Ureteral Stent Placement, Left Ureteroscopy Basket Retrieval of Stone Left Ureteral Stent Placement - Dr. Lynda Hartman Right 8/21/2020    CYSTOSCOPY, RIGHT  URETERAL STENT INSERTION performed by Judith De La Garza MD at [address] [place] [address] Left 12/02/2013    HOLMIUM LASER LITHOTRIPSY, BASKET EXTRACTION  STENT INSERTION     DILATION AND CURETTAGE OF UTERUS      x3    HYSTERECTOMY  1997    LITHOTRIPSY  OTHER SURGICAL HISTORY      samuel?  UPPER GASTROINTESTINAL ENDOSCOPY Left 2020    EGD BIOPSY performed by Alva Pang MD at [facility]    URETEROSCOPY         Social History:    Social History     Tobacco Use    Smoking status: Former Smoker     Packs/day: 0.50     Years: 6.00     Pack years: 3.00     Last attempt to quit: 1973     Years since quittin.2    Smokeless tobacco: Never Used   Substance Use Topics    Alcohol use: Yes     Comment: OCC. Counseling given: Not Answered      Vital Signs (Current):   Vitals:    20 1012 20 1016   BP: (!) 151/78    Pulse: 68    Resp: 18    Temp: 97.9 °F (36.6 °C)    TempSrc: Temporal    SpO2: 98%    Weight:  113 lb (51.3 kg)   Height:  5' 2\" (1.575 m)                                              BP Readings from Last 3 Encounters:   20 (!) 151/78   20 (!) 165/85   20 120/80       NPO Status: Time of last liquid consumption:                         Time of last solid consumption:                         Date of last liquid consumption: 20                        Date of last solid food consumption: 20    BMI:   Wt Readings from Last 3 Encounters:   20 113 lb (51.3 kg)   20 136 lb 9.6 oz (62 kg)   20 137 lb 1.6 oz (62.2 kg)     Body mass index is 20.67 kg/m².     CBC:   Lab Results   Component Value Date    WBC 11.6 2020    RBC 3.22 2020    RBC 3.90 04/15/2016    HGB 9.7 2020    HCT 29.3 2020    MCV 91.0 2020    RDW 13.9 2016     2020       CMP:   Lab Results   Component Value Date     2020    K 3.9 2020    K 3.7 2020     2020    CO2 20 2020    BUN 10 2020    CREATININE 0.5 2020    CREATININE 1.0 2013    LABGLOM >90 2020    GLUCOSE 80 2020    GLUCOSE 89 04/15/2016    PROT 4.6 2020    CALCIUM 8.5 2020    BILITOT 0.3 08/29/2020    ALKPHOS 55 08/29/2020    AST 13 08/29/2020    ALT 13 08/29/2020       POC Tests: No results for input(s): POCGLU, POCNA, POCK, POCCL, POCBUN, POCHEMO, POCHCT in the last 72 hours. Coags:   Lab Results   Component Value Date    INR 1.10 08/21/2020    APTT 22.8 08/21/2020       HCG (If Applicable): No results found for: PREGTESTUR, PREGSERUM, HCG, HCGQUANT     ABGs: No results found for: PHART, PO2ART, ANT6QJW, GGZ3XBZ, BEART, U4ONFYMP     Type & Screen (If Applicable):  Lab Results   Component Value Date    LABRH POS 08/29/2020       Drug/Infectious Status (If Applicable):  No results found for: HIV, HEPCAB    COVID-19 Screening (If Applicable):   Lab Results   Component Value Date    COVID19 Not Detected 09/23/2020         Anesthesia Evaluation  Patient summary reviewed and Nursing notes reviewed no history of anesthetic complications:   Airway: Mallampati: II  TM distance: >3 FB   Neck ROM: full  Mouth opening: > = 3 FB Dental:          Pulmonary:Negative Pulmonary ROS and normal exam  breath sounds clear to auscultation                             Cardiovascular:  Exercise tolerance: good (>4 METS),   (+) hypertension:,       ECG reviewed                        Neuro/Psych:   (+) neuromuscular disease:,             GI/Hepatic/Renal:   (+) renal disease: kidney stones,           Endo/Other:    (+) malignancy/cancer. Pt had no PAT visit       Abdominal:           Vascular: negative vascular ROS. Anesthesia Plan      general     ASA 3       Induction: intravenous. MIPS: Postoperative opioids intended and Prophylactic antiemetics administered. Anesthetic plan and risks discussed with patient. Plan discussed with CRNA.                   Ana María Pierre DO   9/30/2020

## 2020-09-30 NOTE — PROGRESS NOTES
Pt has met discharge criteria and states she is ready for discharge to home. IV removed, gauze and tape applied. Dressed in own clothes and personal belongings gathered. Discharge instructions given to pt and family; pt and family verbalized understanding of discharge instructions, prescriptions and follow up appointments. Pt transported to discharge lobby by [facility] staff.

## 2020-10-01 NOTE — OP NOTE
[address] [id number]                                OPERATIVE REPORT    PATIENT NAME: Everlean Lesch                     :        1950  MED REC NO:   [id number]                           ROOM:  ACCOUNT NO:   [de-identified]                           ADMIT DATE: 2020  PROVIDER:     DAVID Holland Guess OF PROCEDURE:  2020    PREOPERATIVE DIAGNOSIS:  Right renal calculus. POSTOPERATIVE DIAGNOSIS:  Right renal calculus. PROCEDURES PERFORMED:  1. Cystoscopy with stent removal.  2.  Flexible ureteroscopy. 3.  Holmium laser lithotripsy of lower pole calculus. ANESTHESIA:  General.    SURGEON:  Maribel Fallon MD    INDICATIONS:  This is a 58-year-old white female who has cystoscopy and  a stent placed approximately six weeks ago for septicemia. She now  comes in for definitive therapy. She has a history of recurrent  nephrolithiasis. Her last 24-hour urine was several years ago. This  will be done in followup as an outpatient. She understands the  procedures recommended, success rates, common side effects, potential  complications, and followup. OPERATIVE NOTE:  After informed consent was signed and the patient  properly identified, the patient was taken to the operating room, placed  on the operating room table in the supine position where she was given a  general anesthetic. LMA was used for an airway. She was placed in the  lithotomy position. Genitalia were prepped and draped in the usual  sterile manner. A 22-Khmer cystoscope sheath and 30-degree lens were  used to inspect the urethra which appeared normal.  The bladder was  entered and drained. Inspection of the bladder circumferentially  briefly revealed mild erythema scattered throughout the bladder from the  stent irritating the mucosa. No tumor or stone was seen within the  bladder.     Spot film on fluoroscopy shows an oval calcification located in the  vicinity of the lower pole of the right kidney. The stent is in place. Grasping forceps were used to grasp the tip of the stent which was  removed from the patient. Next, two dual-flex wires were passed through  the cystoscopic sheath through a double-lumen ureteral catheter. Both  the wires were passed under fluoroscopic guidance atraumatically into  the renal pelvis. The double-lumen catheter, cystoscope, and sheath  were removed. Over one of the wires, a 28-cm, 11/13-Divehi ureteral access sheath was  passed under fluoroscopic guidance into the proximal ureter. The  introducer and one of the wires were removed with a safety wire in  place. The digital flexible ureteroscope was passed to the ureteral  access sheath into the proximal ureter and then into the renal pelvis. All the calices and infundibuli were inspected. The only stone found  was the right lower pole stone approximately 8 mm in size. A 200-micron  holmium laser fiber was passed through the working channel of the  ureteroscope. Settings on the laser were 8 Hz and 1.2 joules. The  stone was systematically and carefully fragmented into dozens of tiny  fragments. The final part of the procedure involved increasing the  frequency to 10 Hz to further fragment the stones into essentially  almost a fine powder. The laser was removed from the ureteroscope. The  ureteroscope and access sheath were retracted slowly in an inferior  direction, so I could see the entire length of the ureter which revealed  no other stones and no abnormality except mild erythema from the  previous stent. Access sheath and ureteroscope were removed. The  cystoscope and sheath were placed back into the bladder to drain the  bladder. The bladder was drained. The scope and sheath were removed  and this completed the procedures. The patient tolerated the procedures  well with no apparent complications and minimal blood loss.   The patient  returned to PACU.         Petros Haskins M.D.    D: 09/30/2020 14:07:05       T: 09/30/2020 16:29:04     [email or web address]  Job#: [id number]     Doc#: [id number]    CC:

## 2020-10-28 ENCOUNTER — TELEPHONE (OUTPATIENT)
Dept: UROLOGY | Age: 70
End: 2020-10-28

## 2020-10-28 NOTE — TELEPHONE ENCOUNTER
I called the patient and notified her that Dr. Kelsey Boswell would like her to have an appt with a NP to review results. Patient agreed and appt made with Fabiola Peña CNP for 11/09/20.

## 2020-10-28 NOTE — TELEPHONE ENCOUNTER
She needs a follow-up with Jacqueline or Burgess Ponce to discuss the results and treatment plan for her stones based on the 24-hour urine results.

## 2020-11-09 ENCOUNTER — OFFICE VISIT (OUTPATIENT)
Dept: UROLOGY | Age: 70
End: 2020-11-09
Payer: MEDICARE

## 2020-11-09 VITALS — WEIGHT: 118.4 LBS | HEIGHT: 62 IN | TEMPERATURE: 97.7 F | BODY MASS INDEX: 21.79 KG/M2

## 2020-11-09 LAB
BILIRUBIN URINE: NEGATIVE
BLOOD URINE, POC: NEGATIVE
CHARACTER, URINE: CLEAR
COLOR, URINE: YELLOW
GLUCOSE URINE: NEGATIVE MG/DL
KETONES, URINE: ABNORMAL
LEUKOCYTE CLUMPS, URINE: NEGATIVE
NITRITE, URINE: NEGATIVE
PH, URINE: 5 (ref 5–9)
PROTEIN, URINE: NEGATIVE MG/DL
SPECIFIC GRAVITY, URINE: 1.02 (ref 1–1.03)
UROBILINOGEN, URINE: 0.2 EU/DL (ref 0–1)

## 2020-11-09 PROCEDURE — 99214 OFFICE O/P EST MOD 30 MIN: CPT | Performed by: NURSE PRACTITIONER

## 2020-11-09 PROCEDURE — 81003 URINALYSIS AUTO W/O SCOPE: CPT | Performed by: NURSE PRACTITIONER

## 2020-11-09 RX ORDER — POTASSIUM CITRATE 10 MEQ/1
10 TABLET, EXTENDED RELEASE ORAL 2 TIMES DAILY
Qty: 60 TABLET | Refills: 5 | Status: SHIPPED | OUTPATIENT
Start: 2020-11-09 | End: 2021-05-12

## 2020-11-09 NOTE — PROGRESS NOTES
kub    [facility] PROFESSIONAL [address] [place] [id number]  Dept: [phone]  Loc: [phone]  Visit Date: 11/9/2020      HPI:     Kd Gardner is a 65 y.o. with past medical history as listed below who presents today in follow-up for kidney stones. Underwent cystoscopy, right stent removal, and flexible ureteroscopy with lithotripsy of lower pole calculus by Dr. Lionel Lake on 9-. She is here for litholink results. Urine volume is low at 1.21 liters. She has been working on this. She has low citrate levels, as well as low urine PH. She is not on anything for stone prevention. She denies dysuria, hesitancy, weak stream, urgency, frequency, gross hematuria, flank pain, fever, chills, suprapubic pain,and feeling of incomplete emptying. Brit comes in today with her . Hx is obtained from the patient and medical record. Current Outpatient Medications   Medication Sig Dispense Refill    potassium citrate (UROCIT-K) 10 MEQ (1080 MG) extended release tablet Take 1 tablet by mouth 2 times daily 60 tablet 5    citalopram (CELEXA) 10 MG tablet Take 10 mg by mouth nightly      lisinopril (PRINIVIL;ZESTRIL) 10 MG tablet Take 10 mg by mouth daily      docusate sodium (COLACE) 100 MG capsule Take 1 capsule by mouth daily as needed for Constipation 30 capsule 1    melatonin 5 MG TABS tablet Take 5 mg by mouth nightly      Probiotic Product (PROBIOTIC DAILY PO) Take by mouth daily PROBIO 5      Multiple Vitamins-Minerals (THERAPEUTIC MULTIVITAMIN-MINERALS) tablet Take 1 tablet by mouth daily.  UNABLE TO FIND Take 4 tablets by mouth daily. mannotech ambrotose  For fibromyalgia       No current facility-administered medications for this visit.         Past Medical History  Brit  has a past medical history of Anxiety, Breast cancer ([address] Fibromyalgia, Hyperlipidemia, Hypertension, IBS (irritable bowel syndrome), Kidney stones, Nausea & vomiting, and Prolonged emergence from general anesthesia. Past Surgical History  The patient  has a past surgical history that includes  section (); other surgical history; Dilation and curettage of uterus; Breast lumpectomy (); Appendectomy (); Hysterectomy (); Colonoscopy (); Ureteroscopy; Lithotripsy; cystourethroscopy (Left, 2013); Cystoscopy (16); Cystoscopy (Right, 2020); Upper gastrointestinal endoscopy (Left, 2020); and Cystoscopy (Right, 2020). Family History  This patient's family history includes Arthritis in her mother; Cancer in her maternal aunt, maternal grandfather, and mother; Heart Disease in her father and paternal grandmother; Stroke in her maternal grandmother and mother. Social History  [facility]  reports that she quit smoking about 47 years ago. She has a 3.00 pack-year smoking history. She has never used smokeless tobacco. She reports current alcohol use. She reports that she does not use drugs. Subjective:     Review of Systems  No problems with ears, nose or throat. No problems with eyes. No chest pain, shortness of breath, abdominal pain, extremity pain or weakness, and no neurological deficits. No rashes.  symptoms per HPI. The remainder of the review of symptoms is negative. Objective:     PE:   Vitals:    20 1252   Temp: 97.7 °F (36.5 °C)   TempSrc: Temporal   Weight: 118 lb 6.4 oz (53.7 kg)   Height: 5' 2\" (1.575 m)       Constitutional: Alert and oriented times 3, no acute distress and cooperative to examination with appropriate mood and affect. HENT:   Head:        Normocephalic and atraumatic. Mouth/Throat:         Mucous membranes are normal.   Eyes:         EOM are normal. No scleral icterus. PERRLA. Neck:        Supple, symmetrical, trachea midline  Cardiovascular:        Normal rate, regular rhythm, S1 S2 heart sounds. No murmurs, rub, or gallops.    Pulmonary/Chest: Normal respiratory rate and rhthym. No use of accessory muscles. Abdominal:         Soft. No tenderness. Bowel sounds present. Musculoskeletal:         Normal range of motion. No edema or tenderness of lower extremities. Extremities: No cyanosis, clubbing, or edema present. Neurological:        Alert and oriented. Psychiatric:        Normal mood and affect. Labs   Urine dip demonstrates   Results for POC orders placed in visit on 11/09/20   POCT Urinalysis No Micro (Auto)   Result Value Ref Range    Glucose, Ur Negative NEGATIVE mg/dl    Bilirubin Urine Negative     Ketones, Urine Trace (A) NEGATIVE    Specific Gravity, Urine 1.025 1.002 - 1.030    Blood, UA POC Negative NEGATIVE    pH, Urine 5.00 5.0 - 9.0    Protein, Urine Negative NEGATIVE mg/dl    Urobilinogen, Urine 0.20 0.0 - 1.0 eu/dl    Nitrite, Urine Negative NEGATIVE    Leukocyte Clumps, Urine Negative NEGATIVE    Color, Urine Yellow YELLOW-STRAW    Character, Urine Clear CLR-SL.CLOUD        Recent BUN/Creatinine:  Lab Results   Component Value Date    BUN 10 08/31/2020    CREATININE 0.5 08/31/2020    CREATININE 1.0 11/20/2013           Assessment & Plan:     Left renal stones  Hypocitraturia      Reviewed Litholink with pt and . Recommend increasing fluid intake, to achieve 2-3 liters of urine volume voided daily. We discussed adding lemon juice to water, or lemonade, 4oz daily. Would like to start her on potassium citrate. She reports being sensitive to medication. She will talk to her chiropractor first, as she reports he has a protocol for her. If she starts potassium citrate, was advised to get Potassium checked 2 weeks later. She will call with any flank pain, n/v.     KUB in 6 months with a fu. She will call with update after she meets with chiropractor.      MANUEL Velasco  Urology

## 2020-11-09 NOTE — PATIENT INSTRUCTIONS
You may receive a survey regarding the care you received during your visit. Your input is valuable to us. We encourage you to complete and return your survey. We hope you will choose us in the future for your healthcare needs. CALL WITH UPDATE AFTER SEEING CHIROPRACTOR      KIDNEY STONE PREVENTION -- After you have a kidney stone attack, you should have blood and urine tests to determine whether you have certain health problems or dietary issues that increase the risk of kidney stones (table 1). If you passed and saved the stone, it should be analyzed to determine the type of stone. In addition, your clinician may request that you perform a 24-hour urine collection (all the urine you make over a 24-hour period) to determine underlying risk factors for your kidney stone disease. (See \"Patient education: Collection of a 24-hour urine specimen (Beyond the Basics)\". )  Based upon these test results, one or more of the following may be recommended [1]:  ?You may be advised to drink more fluids to decrease the risk of another stone. The goal is to increase the amount of urine that flows through your kidneys and also to lower the concentrations of substances that promote stone formation. Experts recommend drinking enough fluid that you make more than 2 liters of urine per day. ? You may be advised to make changes in your diet; the changes recommended will depend upon the type of kidney stone you have and the 24-hour urine results. ?You may be advised to take a medication to reduce the risk of future stones.

## 2020-11-20 ENCOUNTER — OFFICE VISIT (OUTPATIENT)
Dept: CARDIOLOGY CLINIC | Age: 70
End: 2020-11-20
Payer: MEDICARE

## 2020-11-20 VITALS
DIASTOLIC BLOOD PRESSURE: 86 MMHG | SYSTOLIC BLOOD PRESSURE: 141 MMHG | WEIGHT: 118.8 LBS | BODY MASS INDEX: 21.86 KG/M2 | HEIGHT: 62 IN | HEART RATE: 63 BPM

## 2020-11-20 PROCEDURE — 99204 OFFICE O/P NEW MOD 45 MIN: CPT | Performed by: INTERNAL MEDICINE

## 2020-11-20 NOTE — PROGRESS NOTES
Patient presents in office today as a new patient to establish care with a cardiologist.     C/o of dizziness, vertigo,   denies chest pain, palpitations. Orthostatic BP done.

## 2020-11-20 NOTE — PROGRESS NOTES
[facility],[place] [address] [address] [place] [address]  Dept: [phone]  Dept Fax: [phone]  Loc: [phone]    Visit Date: 11/20/2020    Ms. Cynthia Matias is a 65 y.o. female  who presented for:  Chief Complaint   Patient presents with    New Patient     establish care        HPI:   64 yo F c hx of recent sepsis 2/2 right renal abscesses, is here to followup after hospital discharge. Patient reports that in the hospital she was given a lot of fluids and some pulmonary edema. She underwent Echo in 9/2020 which showed 50%. No chest pains and shortness of breath         Current Outpatient Medications:     OMEGA-3 FATTY ACIDS PO, Take 2 capsules by mouth, Disp: , Rfl:     UNABLE TO FIND, Take 2 tablets by mouth daily Indications: Heart Throbbing or Pounding cataplex b- core, Disp: , Rfl:     UNABLE TO FIND, Take 4.8 g by mouth daily Potassium-HP with magnesium one 4.8 gram scoop mixed with 6 oz of water daily. , Disp: , Rfl:     Bearberry, Uva-Ursi, (UVA URSI PO), Take 2 capsules by mouth 2 times daily Take 1 to 2 caps oral bid, Disp: , Rfl:     UNABLE TO FIND, Take 2 capsules by mouth Curcumin with coQ10 and Astaxanthin, 2 gel caps po, daily, Disp: , Rfl:     citalopram (CELEXA) 10 MG tablet, Take 10 mg by mouth nightly, Disp: , Rfl:     lisinopril (PRINIVIL;ZESTRIL) 10 MG tablet, Take 10 mg by mouth daily, Disp: , Rfl:     melatonin 5 MG TABS tablet, Take 5 mg by mouth nightly, Disp: , Rfl:     Probiotic Product (PROBIOTIC DAILY PO), Take by mouth daily PROBIO 5, Disp: , Rfl:     Multiple Vitamins-Minerals (THERAPEUTIC MULTIVITAMIN-MINERALS) tablet, Take 1 tablet by mouth daily. , Disp: , Rfl:     UNABLE TO FIND, Take 4 tablets by mouth daily.  mannotech ambrotose  For fibromyalgia, Disp: , Rfl:     potassium citrate (UROCIT-K) 10 MEQ (1080 MG) extended release tablet, Take 1 tablet by mouth 2 times daily (Patient not taking: Reported on 11/20/2020), Disp: 60 tablet, Rfl: 5    Past Medical History  Brit  has a past medical history of Anxiety, Breast cancer ([address] Fibromyalgia, Heart palpitations, Hyperlipidemia, Hypertension, IBS (irritable bowel syndrome), Kidney stones, Nausea & vomiting, Prolonged emergence from general anesthesia, and Torn meniscus. Social History  Brit  reports that she quit smoking about 47 years ago. She has a 3.00 pack-year smoking history. She has never used smokeless tobacco. She reports current alcohol use. She reports that she does not use drugs. Family History  Brit family history includes Arthritis in her mother; Cancer in her maternal aunt, maternal grandfather, and mother; Heart Disease in her father and paternal grandmother; Stroke in her maternal grandmother and mother. Past Surgical History   Past Surgical History:   Procedure Laterality Date    APPENDECTOMY  1958    BREAST LUMPECTOMY  [address]    COLONOSCOPY  2007    CYSTOSCOPY  4/27/16    Right Ureteroscopy Laser Lithotripsy Basket Retrieval of Stone Fragments Right Ureteral Stent Placement, Left Ureteroscopy Basket Retrieval of Stone Left Ureteral Stent Placement - Dr. Agata Silva Right 8/21/2020    CYSTOSCOPY, RIGHT  URETERAL STENT INSERTION performed by Sylvia Gray MD at [facility] Right 9/30/2020    CYSTOSCOPY, RIGHT URETEROSCOPY, LASER LITHOTROISPY, BASKET RETRIEVAL OF STONE FRAGMENTS performed by Sylvia Gray MD at [facility] Left 12/02/2013    HOLMIUM LASER LITHOTRIPSY, BASKET EXTRACTION  STENT INSERTION     DILATION AND CURETTAGE OF UTERUS      x3    HYSTERECTOMY  1997    partial     KNEE SURGERY Right     LITHOTRIPSY      OTHER SURGICAL HISTORY      samuel?     UPPER GASTROINTESTINAL ENDOSCOPY Left 8/29/2020    EGD BIOPSY performed by Rey Eng MD at [facility]    URETEROSCOPY         Subjective:     REVIEW OF SYSTEMS  Constitutional: denies sweats, chills and fever  HENT: denies  congestion, sinus pressure, sneezing and sore throat. Eyes: denies  pain, discharge, redness and itching. Respiratory: denies apnea, cough  Gastrointestinal: denies blood in stool, constipation, diarrhea   Endocrine: denies cold intolerance, heat intolerance, polydipsia. Genitourinary: denies dysuria, enuresis, flank pain and hematuria. Musculoskeletal: denies arthralgias, joint swelling and neck pain. Neurological: denies numbness and headaches. Psychiatric/Behavioral: denies agitation, confusion, decreased concentration and dysphoric mood    All others reviewed and are negative. Objective:     BP (!) 141/86 Comment: standing  Pulse 63 Comment: standing  Ht 5' 2\" (1.575 m)   Wt 118 lb 12.8 oz (53.9 kg)   BMI 21.73 kg/m²     Wt Readings from Last 3 Encounters:   11/20/20 118 lb 12.8 oz (53.9 kg)   11/09/20 118 lb 6.4 oz (53.7 kg)   09/30/20 113 lb (51.3 kg)     BP Readings from Last 3 Encounters:   11/20/20 (!) 141/86   09/30/20 (!) 121/57   09/30/20 137/68       PHYSICAL EXAM  Constitutional: Oriented to person, place, and time. Appears well-developed and well-nourished. HENT:   Head: Normocephalic and atraumatic. Eyes: EOM are normal. Pupils are equal, round, and reactive to light. Neck: Normal range of motion. Neck supple. No JVD present. Cardiovascular: Normal rate , normal heart sounds and intact distal pulses. Pulmonary/Chest: Effort normal and breath sounds normal. No respiratory distress. No wheezes. No rales. Abdominal: Soft. Bowel sounds are normal. No distension. There is no tenderness. Musculoskeletal: Normal range of motion. No edema. Neurological: Alert and oriented to person, place, and time. No cranial nerve deficit. Coordination normal.   Skin: Skin is warm and dry. Psychiatric: Normal mood and affect.        No results found for: CKTOTAL, CKMB, CKMBINDEX    Lab Results   Component Value Date    WBC 11.6 09/01/2020    RBC 3.22 09/01/2020    RBC 3.90 04/15/2016    HGB 9.7 09/01/2020    HCT 29.3 09/01/2020    MCV 91.0 09/01/2020    MCH 30.1 09/01/2020    MCHC 33.1 09/01/2020    RDW 13.9 04/16/2016     09/01/2020    MPV 11.1 09/01/2020       Lab Results   Component Value Date     08/31/2020    K 3.9 09/30/2020    K 3.7 08/30/2020     08/31/2020    CO2 20 08/31/2020    BUN 10 08/31/2020    LABALBU 2.2 08/29/2020    CREATININE 0.5 08/31/2020    CREATININE 1.0 11/20/2013    CALCIUM 8.5 08/31/2020    LABGLOM >90 08/31/2020    GLUCOSE 80 08/31/2020    GLUCOSE 89 04/15/2016       Lab Results   Component Value Date    ALKPHOS 55 08/29/2020    ALT 13 08/29/2020    AST 13 08/29/2020    PROT 4.6 08/29/2020    BILITOT 0.3 08/29/2020    BILIDIR <0.2 08/28/2020    LABALBU 2.2 08/29/2020       Lab Results   Component Value Date    MG 1.6 08/29/2020       Lab Results   Component Value Date    INR 1.10 08/21/2020         No results found for: LABA1C    No results found for: TRIG, HDL, LDLCALC, LDLDIRECT, LABVLDL    No results found for: TSH      Testing Reviewed:      I haveindividually reviewed the below cardiac tests    EKG:    ECHO: No results found for this or any previous visit. STRESS:    CATH:    Assessment/Plan       Diagnosis Orders   1. Dizziness         Pulmonary edema 2/2 septic shock/ renal abscess  Ecoli UTI/Sepsis  Dizziness/vertigo    Overall doing well  Denies angina and heart failure   Advised to follow up with vestibular rehab  Went over all the cardiac workup  The patient is asked to make an attempt to improve diet and exercise patterns to aid in medical management of this problem. Advised more plant based nutrition/meditarrean diet   Advised patient to call office or seek immediate medical attention if there is any new onset of  any chest pain, sob, palpitations, lightheadedness, dizziness, orthopnea, PND or pedal edema. All medication side effects were discussed in details.     Thank youfor allowing me to participate in the care of this patient. Please do not hesitate to contact me for any further questions. Return in about 1 year (around 11/20/2021) for Regular follow up, Review testing.        Electronically signed by Shantanu Carmona MD [facility]  11/20/2020 at 11:48 AM EST

## 2020-11-23 ENCOUNTER — TELEPHONE (OUTPATIENT)
Dept: UROLOGY | Age: 70
End: 2020-11-23

## 2020-11-24 NOTE — TELEPHONE ENCOUNTER
Patient advised to have the [address] [place] [address] Avenue completed before next appointment. She voiced understanding. Order sent via mail.

## 2021-01-04 ENCOUNTER — HOSPITAL ENCOUNTER (OUTPATIENT)
Dept: MAMMOGRAPHY | Age: 71
Discharge: HOME | End: 2021-01-04
Payer: MEDICARE

## 2021-01-04 DIAGNOSIS — Z12.31 VISIT FOR SCREENING MAMMOGRAM: ICD-10-CM

## 2021-01-04 PROCEDURE — 77063 BREAST TOMOSYNTHESIS BI: CPT

## 2021-01-14 DIAGNOSIS — N20.0 KIDNEY STONE: Primary | ICD-10-CM

## 2021-05-11 ENCOUNTER — HOSPITAL ENCOUNTER (OUTPATIENT)
Dept: GENERAL RADIOLOGY | Age: 71
Discharge: HOME | End: 2021-05-11

## 2021-05-11 DIAGNOSIS — Z00.6 EXAMINATION FOR NORMAL COMPARISON FOR CLINICAL RESEARCH: ICD-10-CM

## 2021-05-11 NOTE — PROGRESS NOTES
kub    [address] [address]  Dept: [phone]  Loc: [phone]  Visit Date: 5/12/2021      HPI:     Vega Brand is a 65 y.o. with past medical history as listed below who presents today in follow-up for kidney stones. Here to review KUB results. Has left sided renal stones we are following. Underwent cystoscopy, right stent removal, and flexible ureteroscopy with lithotripsy of lower pole calculus by Dr. Joseph Mak on 9-. Litholink reviewed last visit. Urine volume is low at 1.21 liters. She has been working on this. She has low citrate levels, as well as low urine PH. She recently started supplements from chiropractor for stone prevention. Urine PH today 8.5. UA Negative. Denies any flank pain today. Brit comes in today with her . Hx is obtained from the patient and medical record. Current Outpatient Medications   Medication Sig Dispense Refill    OMEGA-3 FATTY ACIDS PO Take 2 capsules by mouth      UNABLE TO FIND Take 2 tablets by mouth daily Indications: Heart Throbbing or Pounding cataplex b- core      UNABLE TO FIND Take 4.8 g by mouth daily Potassium-HP with magnesium one 4.8 gram scoop mixed with 6 oz of water daily.  Bearberry, Uva-Ursi, (UVA URSI PO) Take 2 capsules by mouth 2 times daily Take 1 to 2 caps oral bid      UNABLE TO FIND Take 2 capsules by mouth Curcumin with coQ10 and Astaxanthin, 2 gel caps po, daily      citalopram (CELEXA) 10 MG tablet Take 10 mg by mouth nightly      melatonin 5 MG TABS tablet Take 5 mg by mouth nightly      Probiotic Product (PROBIOTIC DAILY PO) Take by mouth daily PROBIO 5      Multiple Vitamins-Minerals (THERAPEUTIC MULTIVITAMIN-MINERALS) tablet Take 1 tablet by mouth daily. No current facility-administered medications for this visit.         Past Medical History  Brit  has a past medical history of Anxiety, Breast cancer ([facility] [address] Fibromyalgia, Heart palpitations, Hyperlipidemia, Hypertension, IBS (irritable bowel syndrome), Kidney stones, Nausea & vomiting, Prolonged emergence from general anesthesia, and Torn meniscus. Past Surgical History  The patient  has a past surgical history that includes  section (); other surgical history; Dilation and curettage of uterus; Breast lumpectomy (); Appendectomy (); Hysterectomy (); Colonoscopy (); Ureteroscopy; Lithotripsy; cystourethroscopy (Left, 2013); Cystoscopy (16); Cystoscopy (Right, 2020); Upper gastrointestinal endoscopy (Left, 2020); Cystoscopy (Right, 2020); and knee surgery (Right). Family History  This patient's family history includes Arthritis in her mother; Cancer in her maternal aunt, maternal grandfather, and mother; Heart Disease in her father and paternal grandmother; Stroke in her maternal grandmother and mother. Social History  [facility]  reports that she quit smoking about 47 years ago. She has a 3.00 pack-year smoking history. She has never used smokeless tobacco. She reports current alcohol use. She reports that she does not use drugs. Subjective:     Review of Systems  No problems with ears, nose or throat. No problems with eyes. No chest pain, shortness of breath, abdominal pain, extremity pain or weakness, and no neurological deficits. No rashes.  symptoms per HPI. The remainder of the review of symptoms is negative. Objective:     PE:   Vitals:    21 1253   Weight: 124 lb (56.2 kg)   Height: 5' 2\" (1.575 m)       Constitutional: Alert and oriented times 3, no acute distress and cooperative to examination with appropriate mood and affect. HENT:   Head:        Normocephalic and atraumatic. Mouth/Throat:         Mucous membranes are normal.   Eyes:         EOM are normal. No scleral icterus. PERRLA.    Neck:        Supple, symmetrical, trachea midline    Pulmonary/Chest: Normal respiratory rate and rhthym. No use of accessory muscles. Abdominal:         Soft. No tenderness. Bowel sounds present. Musculoskeletal:         Normal range of motion. No edema or tenderness of lower extremities. Extremities: No cyanosis, clubbing, or edema present. Neurological:        Alert and oriented. Psychiatric:        Normal mood and affect. Labs   Urine dip demonstrates   Results for POC orders placed in visit on 05/12/21   POCT Urinalysis No Micro (Auto)   Result Value Ref Range    Glucose, Ur Negative NEGATIVE mg/dl    Bilirubin Urine Negative     Ketones, Urine Negative NEGATIVE    Specific Gravity, Urine 1.020 1.002 - 1.030    Blood, UA POC Negative NEGATIVE    pH, Urine 8.50 5.0 - 9.0    Protein, Urine Negative NEGATIVE mg/dl    Urobilinogen, Urine 0.20 0.0 - 1.0 eu/dl    Nitrite, Urine Negative NEGATIVE    Leukocyte Clumps, Urine Negative NEGATIVE    Color, Urine Yellow YELLOW-STRAW    Character, Urine Clear CLR-SL.CLOUD        Recent BUN/Creatinine:  Lab Results   Component Value Date    BUN 10 08/31/2020    CREATININE 0.5 08/31/2020    CREATININE 1.0 11/20/2013       KUB:            Assessment & Plan:     Left renal stones  Hypocitraturia      Left sided stones are stable. Approx 5 mm stone in left renal pelvis. No flank pain. Discussed possible ESWL. She wants to wait and see if supplements from chiropractor help dissolve stone. She will call with any flank pain, n/v.       KUB in 6 months with a fu.      MANUEL Ayala  Urology

## 2021-05-12 ENCOUNTER — OFFICE VISIT (OUTPATIENT)
Dept: UROLOGY | Age: 71
End: 2021-05-12
Payer: MEDICARE

## 2021-05-12 VITALS — BODY MASS INDEX: 22.82 KG/M2 | WEIGHT: 124 LBS | HEIGHT: 62 IN

## 2021-05-12 DIAGNOSIS — N20.0 KIDNEY STONE: Primary | ICD-10-CM

## 2021-05-12 LAB
BILIRUBIN URINE: NEGATIVE
BLOOD URINE, POC: NEGATIVE
CHARACTER, URINE: CLEAR
COLOR, URINE: YELLOW
GLUCOSE URINE: NEGATIVE MG/DL
KETONES, URINE: NEGATIVE
LEUKOCYTE CLUMPS, URINE: NEGATIVE
NITRITE, URINE: NEGATIVE
PH, URINE: 8.5 (ref 5–9)
PROTEIN, URINE: NEGATIVE MG/DL
SPECIFIC GRAVITY, URINE: 1.02 (ref 1–1.03)
UROBILINOGEN, URINE: 0.2 EU/DL (ref 0–1)

## 2021-05-12 PROCEDURE — 81003 URINALYSIS AUTO W/O SCOPE: CPT | Performed by: NURSE PRACTITIONER

## 2021-05-12 PROCEDURE — 99213 OFFICE O/P EST LOW 20 MIN: CPT | Performed by: NURSE PRACTITIONER

## 2021-10-28 ENCOUNTER — OFFICE VISIT (OUTPATIENT)
Dept: CARDIOLOGY CLINIC | Age: 71
End: 2021-10-28
Payer: MEDICARE

## 2021-10-28 VITALS — DIASTOLIC BLOOD PRESSURE: 80 MMHG | BODY MASS INDEX: 22.68 KG/M2 | SYSTOLIC BLOOD PRESSURE: 158 MMHG | WEIGHT: 124 LBS

## 2021-10-28 DIAGNOSIS — R06.02 SOB (SHORTNESS OF BREATH) ON EXERTION: Primary | ICD-10-CM

## 2021-10-28 DIAGNOSIS — I10 ESSENTIAL HYPERTENSION: ICD-10-CM

## 2021-10-28 PROBLEM — F32.A DEPRESSION: Status: ACTIVE | Noted: 2021-10-28

## 2021-10-28 PROCEDURE — 99214 OFFICE O/P EST MOD 30 MIN: CPT | Performed by: INTERNAL MEDICINE

## 2021-10-28 PROCEDURE — 93000 ELECTROCARDIOGRAM COMPLETE: CPT | Performed by: INTERNAL MEDICINE

## 2021-10-28 RX ORDER — LISINOPRIL 20 MG/1
20 TABLET ORAL DAILY
COMMUNITY

## 2021-10-28 RX ORDER — AMLODIPINE BESYLATE 5 MG/1
5 TABLET ORAL DAILY
Qty: 90 TABLET | Refills: 1 | Status: SHIPPED | OUTPATIENT
Start: 2021-10-28 | End: 2021-11-09 | Stop reason: SDUPTHER

## 2021-10-28 NOTE — PROGRESS NOTES
Pt here for 1 yr check up     EKG done today    Pt concerned about b/p running high noticing last couple week     Pt restarted lisinopril due to high b/p taking for the last 2 weeks     Pt continues with heart palpitations ,

## 2021-10-28 NOTE — PROGRESS NOTES
[facility],[place] [address] [address] [place] [address]  Dept: [phone]  Dept Fax: [phone]  Loc: [phone]    Visit Date: 10/28/2021    Ms. Bev Acosta is a 66 y.o. female  who presented for:  Chief Complaint   Patient presents with    Check-Up    Dizziness       HPI:   66 yo F c hx of hx sepsis 2/2 right renal abscesses, is here to followup after hospital discharge. Patient reports that in the hospital she was given a lot of fluids and some pulmonary edema. She underwent Echo in 9/2020 which showed 50%. No chest pains and shortness of breath. She is here to discuss about her BP. States she has stopped her medication for a while. Current Outpatient Medications:     lisinopril (PRINIVIL;ZESTRIL) 20 MG tablet, Take 20 mg by mouth 2 times daily, Disp: , Rfl:     NONFORMULARY, Vit E daily, Disp: , Rfl:     NONFORMULARY, daily Thyroid natural capsule, Disp: , Rfl:     Ascorbic Acid (VITAMIN C PO), Take by mouth, Disp: , Rfl:     NONFORMULARY, Vit D3 - Vit K, Disp: , Rfl:     amLODIPine (NORVASC) 5 MG tablet, Take 1 tablet by mouth daily, Disp: 90 tablet, Rfl: 1    OMEGA-3 FATTY ACIDS PO, Take 2 capsules by mouth, Disp: , Rfl:     UNABLE TO FIND, Take 2 tablets by mouth daily Indications: Heart Throbbing or Pounding cataplex b- core, Disp: , Rfl:     UNABLE TO FIND, Take 4.8 g by mouth daily Potassium-HP with magnesium one 4.8 gram scoop mixed with 6 oz of water daily. , Disp: , Rfl:     UNABLE TO FIND, Take 1 capsule by mouth Curcumin with coQ10 and Astaxanthin, 2 gel caps po, daily , Disp: , Rfl:     melatonin 5 MG TABS tablet, Take 5 mg by mouth nightly, Disp: , Rfl:     Probiotic Product (PROBIOTIC DAILY PO), Take by mouth every 7 days PROBIO 5, Disp: , Rfl:     Past [address]  has a past medical history of Anxiety, Breast cancer ([facility] [address] Fibromyalgia, Heart palpitations, Hyperlipidemia, Hypertension, IBS (irritable bowel syndrome), Kidney stones, Nausea & vomiting, Prolonged emergence from general anesthesia, and Torn meniscus. Social History  Brit  reports that she quit smoking about 48 years ago. She has a 3.00 pack-year smoking history. She has never used smokeless tobacco. She reports current alcohol use. She reports that she does not use drugs. Family History  [facility] family history includes Arthritis in her mother; Cancer in her maternal aunt, maternal grandfather, and mother; Heart Disease in her father and paternal grandmother; Stroke in her maternal grandmother and mother. Past Surgical History   Past Surgical History:   Procedure Laterality Date    APPENDECTOMY  1958    BREAST LUMPECTOMY  [address]    COLONOSCOPY  2007    CYSTOSCOPY  4/27/16    Right Ureteroscopy Laser Lithotripsy Basket Retrieval of Stone Fragments Right Ureteral Stent Placement, Left Ureteroscopy Basket Retrieval of Stone Left Ureteral Stent Placement - Dr. Kylie Spangler Right 8/21/2020    CYSTOSCOPY, RIGHT  URETERAL STENT INSERTION performed by Keri Luis MD at [place] [address] Right 9/30/2020    CYSTOSCOPY, RIGHT URETEROSCOPY, LASER LITHOTROISPY, BASKET RETRIEVAL OF STONE FRAGMENTS performed by Keri Luis MD at [address] [place] [address] Left 12/02/2013    HOLMIUM LASER LITHOTRIPSY, BASKET EXTRACTION  STENT INSERTION     DILATION AND CURETTAGE OF UTERUS      x3    HYSTERECTOMY  1997    partial     KNEE SURGERY Right     LITHOTRIPSY      OTHER SURGICAL HISTORY      samuel?  UPPER GASTROINTESTINAL ENDOSCOPY Left 8/29/2020    EGD BIOPSY performed by Richard Rosado MD at [facility]    URETEROSCOPY         Subjective:     REVIEW OF SYSTEMS  Constitutional: denies sweats, chills and fever  HENT: denies  congestion, sinus pressure, sneezing and sore throat. Eyes: denies  pain, discharge, redness and itching.    Respiratory: denies apnea, cough  Gastrointestinal: denies blood in stool, constipation, diarrhea   Endocrine: denies cold intolerance, heat intolerance, polydipsia. Genitourinary: denies dysuria, enuresis, flank pain and hematuria. Musculoskeletal: denies arthralgias, joint swelling and neck pain. Neurological: denies numbness and headaches. Psychiatric/Behavioral: denies agitation, confusion, decreased concentration and dysphoric mood    All others reviewed and are negative. Objective:     BP (!) 152/92   Wt 124 lb (56.2 kg)   BMI 22.68 kg/m²     Wt Readings from Last 3 Encounters:   10/28/21 124 lb (56.2 kg)   05/12/21 124 lb (56.2 kg)   11/20/20 118 lb 12.8 oz (53.9 kg)     BP Readings from Last 3 Encounters:   10/28/21 (!) 152/92   11/20/20 (!) 141/86   09/30/20 (!) 121/57       PHYSICAL EXAM  Constitutional: Oriented to person, place, and time. Appears well-developed and well-nourished. HENT:   Head: Normocephalic and atraumatic. Eyes: EOM are normal. Pupils are equal, round, and reactive to light. Neck: Normal range of motion. Neck supple. No JVD present. Cardiovascular: Normal rate , normal heart sounds and intact distal pulses. Pulmonary/Chest: Effort normal and breath sounds normal. No respiratory distress. No wheezes. No rales. Abdominal: Soft. Bowel sounds are normal. No distension. There is no tenderness. Musculoskeletal: Normal range of motion. No edema. Neurological: Alert and oriented to person, place, and time. No cranial nerve deficit. Coordination normal.   Skin: Skin is warm and dry. Psychiatric: Normal mood and affect.        No results found for: CKTOTAL, CKMB, CKMBINDEX    Lab Results   Component Value Date    WBC 11.6 09/01/2020    RBC 3.22 09/01/2020    RBC 3.90 04/15/2016    HGB 9.7 09/01/2020    HCT 29.3 09/01/2020    MCV 91.0 09/01/2020    MCH 30.1 09/01/2020    MCHC 33.1 09/01/2020    RDW 13.9 04/16/2016     09/01/2020    MPV 11.1 09/01/2020       Lab Results   Component Value Date     08/31/2020    K 3.9 09/30/2020    K 3.7 08/30/2020     08/31/2020    CO2 20 08/31/2020    BUN 10 08/31/2020    LABALBU 2.2 08/29/2020    CREATININE 0.5 08/31/2020    CREATININE 1.0 11/20/2013    CALCIUM 8.5 08/31/2020    LABGLOM >90 08/31/2020    GLUCOSE 80 08/31/2020    GLUCOSE 89 04/15/2016       Lab Results   Component Value Date    ALKPHOS 55 08/29/2020    ALT 13 08/29/2020    AST 13 08/29/2020    PROT 4.6 08/29/2020    BILITOT 0.3 08/29/2020    BILIDIR <0.2 08/28/2020    LABALBU 2.2 08/29/2020       Lab Results   Component Value Date    MG 1.6 08/29/2020       Lab Results   Component Value Date    INR 1.10 08/21/2020         No results found for: LABA1C    No results found for: TRIG, HDL, LDLCALC, LDLDIRECT, LABVLDL    No results found for: TSH      Testing Reviewed:      I haveindividually reviewed the below cardiac tests    EKG:    ECHO: No results found for this or any previous visit. STRESS:    CATH:    Assessment/Plan       Diagnosis Orders   1. SOB (shortness of breath) on exertion  EKG 12 Lead   2. Essential hypertension  EKG 12 Lead       HTN--uncontrolled  Dizziness/vertigo    Overall doing well  States her BP as been in the 180s  Now taking Lisinopril 20mg BID  Will add norvasc 5mg daily  EKG sinus bradycardia at 54 bpm  The patient is asked to make an attempt to improve diet and exercise patterns to aid in medical management of this problem. Advised more plant based nutrition/meditarrean diet   Advised patient to call office or seek immediate medical attention if there is any new onset of  any chest pain, sob, palpitations, lightheadedness, dizziness, orthopnea, PND or pedal edema. All medication side effects were discussed in details. Thank youfor allowing me to participate in the care of this patient. Please do not hesitate to contact me for any further questions.      Return in about 6 months (around 4/28/2022), or if symptoms worsen or fail to improve, for Review testing, Regular follow up.        Electronically signed by Itzel Wills MD [facility]  10/28/2021 at 11:48 AM EST

## 2021-11-08 NOTE — TELEPHONE ENCOUNTER
Pt called in BP readings     159/82 62  139/86 58  160/90    146/78 70  162/81 56  166/86 63  131/81 60  162/95 69  146/85 68

## 2021-11-09 RX ORDER — AMLODIPINE BESYLATE 10 MG/1
10 TABLET ORAL DAILY
Qty: 90 TABLET | Refills: 3 | Status: SHIPPED | OUTPATIENT
Start: 2021-11-09 | End: 2022-03-04

## 2021-11-15 DIAGNOSIS — N20.0 KIDNEY STONE: ICD-10-CM

## 2021-11-19 ENCOUNTER — OFFICE VISIT (OUTPATIENT)
Dept: UROLOGY | Age: 71
End: 2021-11-19
Payer: MEDICARE

## 2021-11-19 ENCOUNTER — TELEPHONE (OUTPATIENT)
Dept: UROLOGY | Age: 71
End: 2021-11-19

## 2021-11-19 VITALS — WEIGHT: 122 LBS | HEIGHT: 62 IN | BODY MASS INDEX: 22.45 KG/M2 | RESPIRATION RATE: 16 BRPM

## 2021-11-19 DIAGNOSIS — Z01.818 PRE-OP TESTING: ICD-10-CM

## 2021-11-19 DIAGNOSIS — N20.0 KIDNEY STONE: Primary | ICD-10-CM

## 2021-11-19 PROCEDURE — 99214 OFFICE O/P EST MOD 30 MIN: CPT | Performed by: UROLOGY

## 2021-11-19 RX ORDER — TAMSULOSIN HYDROCHLORIDE 0.4 MG/1
0.4 CAPSULE ORAL DAILY
Qty: 30 CAPSULE | Refills: 0 | Status: ON HOLD | OUTPATIENT
Start: 2021-11-19 | End: 2022-01-17

## 2021-11-19 NOTE — PROGRESS NOTES
MD [facility] / New Patient Visit    Patient:  Zahraa Cheng  YOB: 1950  Date: 11/19/2021  Consult requested from [address] [place] [address]:   The patient is a 66 y.o. female who presents today for follow-up for the following problem(s): kidney stone  Overall the problem(s) : are worsening. Associated Symptoms: No dysuria, gross hematuria. Pain Severity:      Today visit:   11/19/21   Has history of kidney stones. Left kidney stones are found on KUB. She is given options and elects for ESWL. Risks benefits and alternative procedures are explained, informed consent is obtained, and the patient elects to proceed. Summary of old records:   (Patient's old records, notes and chart reviewed and summarized above.)    Urinalysis today:  No results found for this visit on 11/19/21.     Last BUN and creatinine:  Lab Results   Component Value Date    BUN 10 08/31/2020     Lab Results   Component Value Date    CREATININE 0.5 08/31/2020       Imaging Reviewed during this Office Visit:   (results were independently reviewed by physician and radiology report verified)    PAST MEDICAL, FAMILY AND SOCIAL HISTORY:  Past Medical History:   Diagnosis Date    Anxiety     Breast cancer ([facility] [address]     Fibromyalgia     Heart palpitations     Hyperlipidemia     Hypertension     IBS (irritable bowel syndrome)     Kidney stones     Nausea & vomiting     Prolonged emergence from general anesthesia     Torn meniscus     right      Past Surgical History:   Procedure Laterality Date    APPENDECTOMY  1958    BREAST [address]  2007    CYSTOSCOPY  4/27/16    Right Ureteroscopy Laser Lithotripsy Basket Retrieval of Stone Fragments Right Ureteral Stent Placement, Left Ureteroscopy Basket Retrieval of Stone Left Ureteral Stent Placement - Dr. Dana Cornejo Right 8/21/2020 CYSTOSCOPY, RIGHT  URETERAL STENT INSERTION performed by Keri Luis MD at [address] Right 2020    CYSTOSCOPY, RIGHT URETEROSCOPY, LASER LITHOTROISPY, BASKET RETRIEVAL OF STONE FRAGMENTS performed by Keri Luis MD at [address] Left 2013    HOLMIUM LASER LITHOTRIPSY, BASKET EXTRACTION  STENT INSERTION     DILATION AND CURETTAGE OF UTERUS      x3    HYSTERECTOMY      partial     KNEE SURGERY Right     LITHOTRIPSY      OTHER SURGICAL HISTORY      samuel?  UPPER GASTROINTESTINAL ENDOSCOPY Left 2020    EGD BIOPSY [facility]ado [facility]    URETEROSCOPY       Family History   Problem Relation Age of Onset    Arthritis Mother     Cancer Mother     Stroke Mother     Heart Disease Father     Cancer Maternal Aunt     Stroke Maternal Grandmother     Cancer Maternal Grandfather     Heart Disease Paternal Grandmother      No outpatient medications have been marked as taking for the 21 encounter (Office Visit) with Fanny Lopez MD.       Todd Sparks [aspirin]  Social History     Tobacco Use   Smoking Status Former Smoker    Packs/day: 0.50    Years: 6.00    Pack years: 3.00    Quit date: 1973    Years since quittin.4   Smokeless Tobacco Never Used       Social History     Substance and Sexual Activity   Alcohol Use Yes    Comment: OCC. REVIEW OF SYSTEMS:  Constitutional: negative  Eyes: negative  Respiratory: negative  Cardiovascular: negative  Gastrointestinal: negative  Genitourinary: negative  Musculoskeletal: negative  Skin: negative   Neurological: negative  Hematological/Lymphatic: negative  Psychological: negative    Physical Exam:    This a 66 y.o. female      Vitals:    21 1142   Resp: 16     Constitutional: Patient in no acute distress   Neuro: alert and oriented to person place and time.     Psych: Mood and affect normal.  Head: atraumatic normocephalic  Eyes: EOMi  HEENT: neck supple, trachea midline  Lungs: Respiratory effort normal  Cardiovascular:  Normal peripheral pulses  Abdomen: Soft, non-tender, non-distended, No CVA  Bladder: non-tender and not distended. FROMx4, no cyanosis clubbing edema  Skin: warm and dry      Assessment and Plan      1. Kidney stone           Plan:      No follow-ups on file.   Left ESWL  Flomax

## 2021-11-29 ENCOUNTER — TELEPHONE (OUTPATIENT)
Dept: CARDIOLOGY CLINIC | Age: 71
End: 2021-11-29

## 2021-11-29 NOTE — TELEPHONE ENCOUNTER
Patient LM stating she is following with a new PCP Dr. Terrie Holstein at [facility]. Patient states Dr. Derald Shone will be taking over her medications, but he needs a note from Dr. Fartun Lowe stating it's ok for him to dose medications? ???

## 2021-12-03 ENCOUNTER — TELEPHONE (OUTPATIENT)
Dept: UROLOGY | Age: 71
End: 2021-12-03

## 2021-12-03 NOTE — TELEPHONE ENCOUNTER
Patient scheduled for surgery with Dr Adeola Corcoran. Surgery consent on arrival. Patient to do pre op urine culture and chest xray on 1/3/22. Dr Carolina Ramsey to clear. Surgery instructions mailed to the patient.       Next Med Confirmation # [id number]

## 2021-12-03 NOTE — TELEPHONE ENCOUNTER
Patient scheduled for a Left extracorporeal shock wave lithotripsy with Dr Maged Ojeda on 1/17/22.  We are asking for clearance

## 2021-12-06 ENCOUNTER — TELEPHONE (OUTPATIENT)
Dept: UROLOGY | Age: 71
End: 2021-12-06

## 2021-12-20 ENCOUNTER — PREP FOR PROCEDURE (OUTPATIENT)
Dept: UROLOGY | Age: 71
End: 2021-12-20

## 2021-12-20 RX ORDER — SODIUM CHLORIDE 9 MG/ML
INJECTION, SOLUTION INTRAVENOUS CONTINUOUS
Status: CANCELLED | OUTPATIENT
Start: 2022-01-17

## 2022-01-07 ENCOUNTER — HOSPITAL ENCOUNTER (OUTPATIENT)
Dept: MAMMOGRAPHY | Age: 72
Discharge: HOME | End: 2022-01-07
Payer: MEDICARE

## 2022-01-07 DIAGNOSIS — Z12.39 BREAST SCREENING: ICD-10-CM

## 2022-01-07 PROCEDURE — 77063 BREAST TOMOSYNTHESIS BI: CPT

## 2022-01-10 ENCOUNTER — TELEPHONE (OUTPATIENT)
Dept: UROLOGY | Age: 72
End: 2022-01-10

## 2022-01-10 RX ORDER — CEPHALEXIN 500 MG/1
500 CAPSULE ORAL 2 TIMES DAILY
Qty: 14 CAPSULE | Refills: 0 | Status: ON HOLD | OUTPATIENT
Start: 2022-01-10 | End: 2022-01-17

## 2022-01-11 DIAGNOSIS — Z01.818 PRE-OP TESTING: ICD-10-CM

## 2022-01-11 DIAGNOSIS — N20.0 KIDNEY STONE: ICD-10-CM

## 2022-01-11 NOTE — PROGRESS NOTES
Following instructions given to patient, who states understanding:    NPO after midnight  [employer] and 's license  Wear comfortable clean clothing  Do not bring jewelry   Shower night before and morning of surgery with a liquid antibacterial soap  Bring medications in original bottles  Follow all instructions given by your physician   needed at discharge  Call [phone] for any questions  Report to SDS on 2nd floor  If you would become ill prior to surgery, please call the surgeon  May have a visitor with you, we request that you limit to 2 visitors in pre-op area  Please bring and wear mask    Covid questionnaire Complete; Patient was covid postive 12/23/21, has mild runny nose and loss of taste and smell. See documentation.

## 2022-01-11 NOTE — PROGRESS NOTES
In preparation for their surgical procedure above patient was screened for Obstructive Sleep Apnea (GRACIE) using the STOP-Bang Questionnaire by the Pre-Admission Testing department. This is a pre-surgical screening tool for patient safety and serves as a recommendation, this WILL NOT cause cancellation of surgery. STOP-Bang Questionnaire  * Do you currently see a pulmonologist?  No     If yes STOP, do not complete. Patient follows with Dr.     1.  Do you snore loudly (able to be heard in the next room)? No    2. Do you often feel tired or sleepy during the daytime? No       3. Has anyone ever told you that you stop breathing during your sleep? No    4. Do you have or are you being treated for high blood pressure? Yes      5. BMI more than 35? BMI (Calculated): 21.3        No    6. Age over 48 years? 66 y.o. Yes    7. Neck Circumference greater than 17 inches for male or 16 inches for female? Measured           (visits only)            Not Applicable    8. Gender Male? No      TOTAL SCORE: 2    GRACIE - Low Risk : Yes to 0 - 2 questions  GRACIE - Intermediate Risk : Yes to 3 - 4 questions  GRACIE - High Risk : Yes to 5 - 8 questions    Adapted from:   STOP Questionnaire: A Tool to Screen Patients for Obstructive Sleep Apnea   LYNDA Paredes.P.C., Carolyn Shah, M.B.B.S., Arian Palafox M.D., Ike Yeung. Lance Villarreal, Ph.D., LISBETH Cole.B.B.S., Atilio Hodgson M.Sc., Gary Lawrence M.D., [facility]. FAUSTINO YbarraPArvindC.    Anesthesiology 2008; [id number] Copyright 2008, the [address] of Anesthesiologists, [facility] 37.   ----------------------------------------------------------------------------------------------------------------

## 2022-01-17 ENCOUNTER — HOSPITAL ENCOUNTER (OUTPATIENT)
Age: 72
Setting detail: OUTPATIENT SURGERY
Discharge: HOME | End: 2022-01-17
Attending: UROLOGY | Admitting: UROLOGY
Payer: MEDICARE

## 2022-01-17 ENCOUNTER — ANESTHESIA EVENT (OUTPATIENT)
Dept: OPERATING ROOM | Age: 72
End: 2022-01-17
Payer: MEDICARE

## 2022-01-17 ENCOUNTER — ANESTHESIA (OUTPATIENT)
Dept: OPERATING ROOM | Age: 72
End: 2022-01-17
Payer: MEDICARE

## 2022-01-17 VITALS
RESPIRATION RATE: 16 BRPM | DIASTOLIC BLOOD PRESSURE: 78 MMHG | HEIGHT: 62 IN | SYSTOLIC BLOOD PRESSURE: 131 MMHG | BODY MASS INDEX: 22.26 KG/M2 | WEIGHT: 121 LBS | TEMPERATURE: 98.3 F | HEART RATE: 63 BPM | OXYGEN SATURATION: 98 %

## 2022-01-17 VITALS — SYSTOLIC BLOOD PRESSURE: 114 MMHG | TEMPERATURE: 97.7 F | OXYGEN SATURATION: 100 % | DIASTOLIC BLOOD PRESSURE: 59 MMHG

## 2022-01-17 DIAGNOSIS — N20.0 NEPHROLITHIASIS: Primary | ICD-10-CM

## 2022-01-17 PROCEDURE — 7100000000 HC PACU RECOVERY - FIRST 15 MIN: Performed by: UROLOGY

## 2022-01-17 PROCEDURE — 3700000000 HC ANESTHESIA ATTENDED CARE: Performed by: UROLOGY

## 2022-01-17 PROCEDURE — 3600000012 HC SURGERY LEVEL 2 ADDTL 15MIN: Performed by: UROLOGY

## 2022-01-17 PROCEDURE — 3700000001 HC ADD 15 MINUTES (ANESTHESIA): Performed by: UROLOGY

## 2022-01-17 PROCEDURE — 7100000010 HC PHASE II RECOVERY - FIRST 15 MIN: Performed by: UROLOGY

## 2022-01-17 PROCEDURE — 2709999900 HC NON-CHARGEABLE SUPPLY: Performed by: UROLOGY

## 2022-01-17 PROCEDURE — 7100000001 HC PACU RECOVERY - ADDTL 15 MIN: Performed by: UROLOGY

## 2022-01-17 PROCEDURE — 7100000011 HC PHASE II RECOVERY - ADDTL 15 MIN: Performed by: UROLOGY

## 2022-01-17 PROCEDURE — 6360000002 HC RX W HCPCS: Performed by: UROLOGY

## 2022-01-17 PROCEDURE — 6360000002 HC RX W HCPCS: Performed by: NURSE ANESTHETIST, CERTIFIED REGISTERED

## 2022-01-17 PROCEDURE — 3600000002 HC SURGERY LEVEL 2 BASE: Performed by: UROLOGY

## 2022-01-17 PROCEDURE — 2580000003 HC RX 258: Performed by: UROLOGY

## 2022-01-17 PROCEDURE — 2500000003 HC RX 250 WO HCPCS

## 2022-01-17 RX ORDER — CEFAZOLIN SODIUM 2 G/100ML
2000 INJECTION, SOLUTION INTRAVENOUS
Status: COMPLETED | OUTPATIENT
Start: 2022-01-17 | End: 2022-01-17

## 2022-01-17 RX ORDER — DEXAMETHASONE SODIUM PHOSPHATE 10 MG/ML
INJECTION, EMULSION INTRAMUSCULAR; INTRAVENOUS PRN
Status: DISCONTINUED | OUTPATIENT
Start: 2022-01-17 | End: 2022-01-17 | Stop reason: SDUPTHER

## 2022-01-17 RX ORDER — PROPOFOL 10 MG/ML
INJECTION, EMULSION INTRAVENOUS PRN
Status: DISCONTINUED | OUTPATIENT
Start: 2022-01-17 | End: 2022-01-17 | Stop reason: SDUPTHER

## 2022-01-17 RX ORDER — LABETALOL 20 MG/4 ML (5 MG/ML) INTRAVENOUS SYRINGE
10 EVERY 10 MIN PRN
Status: DISCONTINUED | OUTPATIENT
Start: 2022-01-17 | End: 2022-01-17 | Stop reason: HOSPADM

## 2022-01-17 RX ORDER — LABETALOL 20 MG/4 ML (5 MG/ML) INTRAVENOUS SYRINGE
Status: COMPLETED
Start: 2022-01-17 | End: 2022-01-17

## 2022-01-17 RX ORDER — SODIUM CHLORIDE 9 MG/ML
INJECTION, SOLUTION INTRAVENOUS CONTINUOUS
Status: DISCONTINUED | OUTPATIENT
Start: 2022-01-17 | End: 2022-01-17 | Stop reason: HOSPADM

## 2022-01-17 RX ORDER — ONDANSETRON 2 MG/ML
INJECTION INTRAMUSCULAR; INTRAVENOUS PRN
Status: DISCONTINUED | OUTPATIENT
Start: 2022-01-17 | End: 2022-01-17 | Stop reason: SDUPTHER

## 2022-01-17 RX ORDER — OXYCODONE HYDROCHLORIDE AND ACETAMINOPHEN 5; 325 MG/1; MG/1
1 TABLET ORAL EVERY 6 HOURS PRN
Qty: 12 TABLET | Refills: 0 | Status: SHIPPED | OUTPATIENT
Start: 2022-01-17 | End: 2022-01-24

## 2022-01-17 RX ADMIN — PROPOFOL 50 MG: 10 INJECTION, EMULSION INTRAVENOUS at 10:11

## 2022-01-17 RX ADMIN — Medication 50 MG: at 10:07

## 2022-01-17 RX ADMIN — ONDANSETRON 4 MG: 2 INJECTION INTRAMUSCULAR; INTRAVENOUS at 10:20

## 2022-01-17 RX ADMIN — CEFAZOLIN SODIUM 2000 MG: 2 INJECTION, SOLUTION INTRAVENOUS at 10:16

## 2022-01-17 RX ADMIN — DEXAMETHASONE SODIUM PHOSPHATE 8 MG: 10 INJECTION, EMULSION INTRAMUSCULAR; INTRAVENOUS at 10:07

## 2022-01-17 RX ADMIN — SODIUM CHLORIDE: 9 INJECTION, SOLUTION INTRAVENOUS at 08:31

## 2022-01-17 RX ADMIN — PROPOFOL 100 MG: 10 INJECTION, EMULSION INTRAVENOUS at 10:07

## 2022-01-17 RX ADMIN — LABETALOL 20 MG/4 ML (5 MG/ML) INTRAVENOUS SYRINGE 10 MG: at 11:21

## 2022-01-17 ASSESSMENT — PULMONARY FUNCTION TESTS
PIF_VALUE: 0
PIF_VALUE: 2
PIF_VALUE: 0
PIF_VALUE: 2
PIF_VALUE: 1
PIF_VALUE: 2
PIF_VALUE: 2
PIF_VALUE: 24
PIF_VALUE: 2
PIF_VALUE: 3
PIF_VALUE: 2
PIF_VALUE: 3
PIF_VALUE: 2
PIF_VALUE: 3
PIF_VALUE: 2
PIF_VALUE: 1
PIF_VALUE: 1
PIF_VALUE: 2
PIF_VALUE: 0
PIF_VALUE: 2
PIF_VALUE: 3
PIF_VALUE: 0
PIF_VALUE: 2
PIF_VALUE: 0
PIF_VALUE: 2
PIF_VALUE: 2
PIF_VALUE: 3
PIF_VALUE: 2
PIF_VALUE: 0
PIF_VALUE: 1
PIF_VALUE: 2
PIF_VALUE: 1
PIF_VALUE: 2

## 2022-01-17 ASSESSMENT — PAIN SCALES - GENERAL
PAINLEVEL_OUTOF10: 0
PAINLEVEL_OUTOF10: 0
PAINLEVEL_OUTOF10: 2
PAINLEVEL_OUTOF10: 0

## 2022-01-17 ASSESSMENT — PAIN - FUNCTIONAL ASSESSMENT: PAIN_FUNCTIONAL_ASSESSMENT: 0-10

## 2022-01-17 NOTE — ANESTHESIA PRE PROCEDURE
Department of Anesthesiology  Preprocedure Note       Name:  Xochitl Tirado   Age:  66 y.o.  :  1950                                          MRN:  [id number]         Date:  2022      Surgeon: Iman Lawler):  Mara Ramos MD    Procedure: Procedure(s):  LEFT EXTRACORPOREAL SHOCK WAVE LITHOTRIPSY    Medications prior to admission:   Prior to Admission medications    Medication Sig Start Date End Date Taking? Authorizing Provider   amLODIPine (NORVASC) 10 MG tablet Take 1 tablet by mouth daily 21  Yes MANUEL Kuhn CNP   lisinopril (PRINIVIL;ZESTRIL) 20 MG tablet Take 20 mg by mouth daily    Yes Historical Provider, MD   NONFORMULARY daily Thyroid natural capsule   Yes Historical Provider, MD   Ascorbic Acid (VITAMIN C PO) Take by mouth   Yes Historical Provider, MD   NONFORMULARY Vit D3 - Vit K   Yes Historical Provider, MD   OMEGA-3 FATTY ACIDS PO Take 2 capsules by mouth   Yes Historical Provider, MD   UNABLE TO FIND Take 2 tablets by mouth daily Indications: Heart Throbbing or Pounding cataplex b- core   Yes Historical Provider, MD   UNABLE TO FIND Take 4.8 g by mouth daily Potassium-HP with magnesium one 4.8 gram scoop mixed with 6 oz of water daily.    Yes Historical Provider, MD   UNABLE TO FIND Take 1 capsule by mouth Curcumin with coQ10 and Astaxanthin, 2 gel caps po, daily    Yes Historical Provider, MD   melatonin 5 MG TABS tablet Take 5 mg by mouth nightly   Yes Historical Provider, MD   Probiotic Product (PROBIOTIC DAILY PO) Take by mouth every 7 days PROBIO 5   Yes Historical Provider, MD       Current medications:    Current Facility-Administered Medications   Medication Dose Route Frequency Provider Last Rate Last Admin    0.9 % sodium chloride infusion   IntraVENous Continuous Mara Ramos  mL/hr at 22 0831 New Bag at 22 0831    ceFAZolin (ANCEF) 2000 mg in dextrose 4 % 100 mL IVPB (premix)  2,000 mg IntraVENous 30 Min Pre-Op Apple Mora Michelle Gtz MD           Allergies: Allergies   Allergen Reactions    Asa [Aspirin] Other (See Comments)     jasmyne       Problem List:    Patient Active Problem List   Diagnosis Code    History of breast cancer Z85.3    Simple adnexal cyst greater than 1 cm in diameter in postmenopausal patient N94.89, N95.8    Essential hypertension I10    Fibromyalgia M79.7    Chronic anxiety F41.9    Loose stools R19.5    Examination for normal comparison for clinical research Z00.6    Acute respiratory failure with hypoxia ([address] J96.01    Melena K92.1    Depression F32. A       Past Medical History:        Diagnosis Date    Anxiety     Breast cancer [facility]) 1998    right lumpectomy, radiation and chemo    COVID-19 12/23/2021    Fibromyalgia     Heart palpitations     History of blood transfusion 2020    Hyperlipidemia     Hypertension     IBS (irritable bowel syndrome)     Kidney stones     Nausea & vomiting     Prolonged emergence from general anesthesia     Torn meniscus     right        Past Surgical History:        Procedure Laterality Date    APPENDECTOMY  1958   [address] [place] [address] COLONOSCOPY  2007    CYSTOSCOPY  4/27/16    Right Ureteroscopy Laser Lithotripsy Basket Retrieval of Stone Fragments Right Ureteral Stent Placement, Left Ureteroscopy Basket Retrieval of Stone Left Ureteral Stent Placement - Dr. Shaheen Villa Right 8/21/2020    CYSTOSCOPY, RIGHT  URETERAL STENT INSERTION performed by Elisha Griffin MD at [address] Right 9/30/2020    CYSTOSCOPY, RIGHT URETEROSCOPY, LASER LITHOTROISPY, BASKET RETRIEVAL OF STONE FRAGMENTS performed by Elisha Griffin MD at [facility] Left 12/02/2013    HOLMIUM LASER LITHOTRIPSY, BASKET EXTRACTION  STENT INSERTION     DILATION AND CURETTAGE OF UTERUS      x3    HYSTERECTOMY  1997    partial     KNEE SURGERY Right     LITHOTRIPSY      OTHER SURGICAL HISTORY      samuel?  UPPER GASTROINTESTINAL ENDOSCOPY Left 2020    EGD BIOPSY performed by Jenny Bonilla MD at [facility]    URETEROSCOPY         Social History:    Social History     Tobacco Use    Smoking status: Former Smoker     Packs/day: 0.50     Years: 6.00     Pack years: 3.00     Quit date: 1973     Years since quittin.5    Smokeless tobacco: Never Used   Substance Use Topics    Alcohol use: Yes     Comment: OCC. Counseling given: Not Answered      Vital Signs (Current):   Vitals:    22 0927 22 0754 22 0759   BP:  (!) 155/74    Pulse:  61    Resp:  16    Temp:  97.4 °F (36.3 °C)    SpO2:  98%    Weight: 116 lb (52.6 kg)  121 lb (54.9 kg)   Height: 5' 2\" (1.575 m)  5' 2\" (1.575 m)                                              BP Readings from Last 3 Encounters:   22 (!) 155/74   10/28/21 (!) 158/80   20 (!) 141/86       NPO Status: Time of last liquid consumption: 0615 (sip with meds)                        Time of last solid consumption: 1700                        Date of last liquid consumption: 22                        Date of last solid food consumption: 22    BMI:   Wt Readings from Last 3 Encounters:   22 121 lb (54.9 kg)   21 122 lb (55.3 kg)   10/28/21 124 lb (56.2 kg)     Body mass index is 22.13 kg/m².     CBC:   Lab Results   Component Value Date    WBC 11.6 2020    RBC 3.22 2020    RBC 3.90 04/15/2016    HGB 9.7 2020    HCT 29.3 2020    MCV 91.0 2020    RDW 13.9 2016     2020       CMP:   Lab Results   Component Value Date     2020    K 3.9 2020    K 3.7 2020     2020    CO2 20 2020    BUN 10 2020    CREATININE 0.5 2020    CREATININE 1.0 2013    LABGLOM >90 2020    GLUCOSE 80 2020    GLUCOSE 89 04/15/2016    PROT 4.6 2020    CALCIUM 8.5 2020    BILITOT 0.3 2020 ALKPHOS 55 08/29/2020    AST 13 08/29/2020    ALT 13 08/29/2020       POC Tests: No results for input(s): POCGLU, POCNA, POCK, POCCL, POCBUN, POCHEMO, POCHCT in the last 72 hours. Coags:   Lab Results   Component Value Date    INR 1.10 08/21/2020    APTT 22.8 08/21/2020       HCG (If Applicable): No results found for: PREGTESTUR, PREGSERUM, HCG, HCGQUANT     ABGs: No results found for: PHART, PO2ART, GQM6JJV, JYL0KYF, BEART, G7LTVRBH     Type & Screen (If Applicable):  Lab Results   Component Value Date    LABRH POS 08/29/2020       Drug/Infectious Status (If Applicable):  No results found for: HIV, HEPCAB    COVID-19 Screening (If Applicable):   Lab Results   Component Value Date    COVID19 Not Detected 09/23/2020           Anesthesia Evaluation  Patient summary reviewed and Nursing notes reviewed  Airway: Mallampati: II        Dental:          Pulmonary: breath sounds clear to auscultation                             Cardiovascular:  Exercise tolerance: no interval change,   (+) hypertension:,       ECG reviewed  Rhythm: regular  Rate: normal                    Neuro/Psych:   (+) neuromuscular disease:, psychiatric history:            GI/Hepatic/Renal:   (+) renal disease: kidney stones,           Endo/Other:                     Abdominal:             Vascular: Other Findings:             Anesthesia Plan      general     ASA 2       Induction: intravenous. MIPS: Prophylactic antiemetics administered. Anesthetic plan and risks discussed with patient and spouse. Plan discussed with CRNA.                   Jude Lares DO   1/17/2022

## 2022-01-17 NOTE — H&P
Carolina Tinsley  Urology H&P Note     Patient:  Romina Becerra  MRN: [id number]  YOB: 1950    ATTENDING: Bebe Moreno MD     CHIEF COMPLAINT:  Left kidney stone    HISTORY OF PRESENT ILLNESS:   The patient is a 66 y.o. female who presents with left kidney stone. She is here for ESWL. Risks benefits and alternative procedures are explained, informed consent is obtained, and the patient elects to proceed. Patient's old records, notes and chart reviewed and summarized above.      Past Medical History:    Past Medical History:   Diagnosis Date    Anxiety     Breast cancer ([address] 1998    right lumpectomy, radiation and chemo    COVID-19 12/23/2021    Fibromyalgia     Heart palpitations     History of blood transfusion 2020    Hyperlipidemia     Hypertension     IBS (irritable bowel syndrome)     Kidney stones     Nausea & vomiting     Prolonged emergence from general anesthesia     Torn meniscus     right        Past Surgical History:    Past Surgical History:   Procedure Laterality Date    APPENDECTOMY  1958    BREAST [facility]    COLONOSCOPY  2007    CYSTOSCOPY  4/27/16    Right Ureteroscopy Laser Lithotripsy Basket Retrieval of Stone Fragments Right Ureteral Stent Placement, Left Ureteroscopy Basket Retrieval of Stone Left Ureteral Stent Placement - Dr. Terrance Nowak Right 8/21/2020    CYSTOSCOPY, RIGHT  URETERAL STENT INSERTION performed by Isaac Méndez MD at [place] [address] Right 9/30/2020    CYSTOSCOPY, RIGHT URETEROSCOPY, LASER LITHOTROISPY, BASKET RETRIEVAL OF STONE FRAGMENTS performed by Isaac Méndez MD at [address] [place] [address] Left 12/02/2013    HOLMIUM LASER LITHOTRIPSY, BASKET EXTRACTION  STENT INSERTION     DILATION AND CURETTAGE OF UTERUS      x3    HYSTERECTOMY  1997    partial     KNEE SURGERY Right     LITHOTRIPSY      OTHER SURGICAL HISTORY samuel?    UPPER GASTROINTESTINAL ENDOSCOPY Left 2020    EGD BIOPSY performed by Ghazala Andres MD at [facility]    URETEROSCOPY         Medications Prior to Admission:   Prior to Admission medications    Medication Sig Start Date End Date Taking? Authorizing Provider   amLODIPine (NORVASC) 10 MG tablet Take 1 tablet by mouth daily 21  Yes Luz Bernard, APRN - CNP   lisinopril (PRINIVIL;ZESTRIL) 20 MG tablet Take 20 mg by mouth daily    Yes Historical Provider, MD   NONFORMULARY daily Thyroid natural capsule   Yes Historical Provider, MD   Ascorbic Acid (VITAMIN C PO) Take by mouth   Yes Historical Provider, MD   NONFORMULARY Vit D3 - Vit K   Yes Historical Provider, MD   OMEGA-3 FATTY ACIDS PO Take 2 capsules by mouth   Yes Historical Provider, MD   UNABLE TO FIND Take 2 tablets by mouth daily Indications: Heart Throbbing or Pounding cataplex b- core   Yes Historical Provider, MD   UNABLE TO FIND Take 4.8 g by mouth daily Potassium-HP with magnesium one 4.8 gram scoop mixed with 6 oz of water daily.    Yes Historical Provider, MD   UNABLE TO FIND Take 1 capsule by mouth Curcumin with coQ10 and Astaxanthin, 2 gel caps po, daily    Yes Historical Provider, MD   melatonin 5 MG TABS tablet Take 5 mg by mouth nightly   Yes Historical Provider, MD   Probiotic Product (PROBIOTIC DAILY PO) Take by mouth every 7 days PROBIO 5   Yes Historical Provider, MD       Allergies:  Asa [aspirin]    Social History:    Social History     Socioeconomic History    Marital status:      Spouse name: Not on file    Number of children: Not on file    Years of education: Not on file    Highest education level: Not on file   Occupational History    Not on file   Tobacco Use    Smoking status: Former Smoker     Packs/day: 0.50     Years: 6.00     Pack years: 3.00     Quit date: 1973     Years since quittin.5    Smokeless tobacco: Never Used   Vaping Use    Vaping Use: Never used   Substance and Sexual Activity    Alcohol use: Yes     Comment: OCC.  Drug use: No    Sexual activity: Not on file   Other Topics Concern    Not on file   Social History Narrative    Not on file     Social Determinants of Health     Financial Resource Strain:     Difficulty of Paying Living Expenses: Not on file   Food Insecurity:     Worried About Running Out of Food in the Last Year: Not on file    Fidencio of Food in the Last Year: Not on file   Transportation Needs:     Lack of Transportation (Medical): Not on file    Lack of Transportation (Non-Medical): Not on file   Physical Activity:     Days of Exercise per Week: Not on file    Minutes of Exercise per Session: Not on file   Stress:     Feeling of Stress : Not on file   Social Connections:     Frequency of Communication with Friends and Family: Not on file    Frequency of Social Gatherings with Friends and Family: Not on file    Attends Evangelical Services: Not on file    Active Member of [address] [place] [address] or Organizations: Not on file    Attends Club or Organization Meetings: Not on file    Marital Status: Not on file   Intimate Partner Violence:     Fear of Current or Ex-Partner: Not on file    Emotionally Abused: Not on file    Physically Abused: Not on file    Sexually Abused: Not on file   Housing Stability:     Unable to Pay for Housing in the Last Year: Not on file    Number of [place] in the Last Year: Not on file    Unstable Housing in the Last Year: Not on file       Family History:    Family History   Problem Relation Age of Onset    Arthritis Mother     Stroke Mother     Breast Cancer Mother 48    Heart Disease Father     Breast Cancer Maternal Aunt         young, age unknown    Stroke Maternal Grandmother     Cancer Maternal Grandfather     Heart Disease Paternal Grandmother        REVIEW OF SYSTEMS:  All systems reviewed and negative except for that already noted in the HPI.     Physical Exam:      Patient Vitals for the past 24 hrs:   BP Temp Pulse Resp SpO2 Height Weight   01/17/22 0759 -- -- -- -- -- 5' 2\" (1.575 m) 121 lb (54.9 kg)   01/17/22 0754 (!) 155/74 97.4 °F (36.3 °C) 61 16 98 % -- --     Constitutional: Patient in no acute distress; Neuro: alert and oriented to person place and time. Psych: Mood and affect normal.  Skin: Normal  Lungs: Respiratory effort normal  Cardiovascular:  Normal peripheral pulses  Abdomen: Soft, non-tender, non-distended with no CVA, flank pain, hepatosplenomegaly or hernia. Kidneys normal.  Bladder non-tender and not distended.   Lymphatics: no palpable lymphadenopathy        Assessment and Plan   Impression:    Patient Active Problem List   Diagnosis    History of breast cancer    Simple adnexal cyst greater than 1 cm in diameter in postmenopausal patient    Essential hypertension    Fibromyalgia    Chronic anxiety    Loose stools    Examination for normal comparison for clinical research    Acute respiratory failure with hypoxia ([address]    Melena    Depression       Plan:   Left ESWL

## 2022-01-17 NOTE — PROGRESS NOTES

## 2022-01-17 NOTE — OP NOTE
FACILITY:  [address] [place] [address] [address] [facility] [address]: 01/17/22  Abdifatah Borden  1950  [id number]     SURGEON:  Dr. Tanvi Anderson MD , MD   ASSISTANT:  Jonah Hernandez MD MD  PREOPERATIVE DIAGNOSIS:  Renal calculus  POSTOPERATIVE DIAGNOSIS: Same  PROCEDURES PERFORMED:  1. Left sided extracorporeal shockwave lithotripsy. ANESTHESIA:  Gen et    COMPLICATIONS: None. DRAINS:  BL ureteral catheters (may be removed by the primary service at the end of the case)  SPECIMEN: none  ESTIMATED BLOOD LOSS:  Less than 5 mL.     INDICATIONS:  Abdifatah Borden is a 66 y.o. female presents with kidney stones. All treatment options were dicussed including risks, benefits, alternatives, goals and possible complications. Patient elected above mentioned procedure. Consent was obtained and patient elected to proceed.     DESCRIPTION OF PROCEDURE:  The patient was placed in the supine position and underwent general  anesthesia induction. After a timeout was taken per protocol with everyone in agreement we began the procedure. Using fluoroscopy the stone was visualized in the superior and lower pole as noted previously. We positioned the stone over F2. The stone was then treated with 200 shocks at a low power level. There was a 2 minute pause after 200 shocks. We then continued the lithotripsy. We started at a power level of 1, and increased to a power level of 7. The total number of shocks given were 3000 shocks. The frequency was 60-90. Throughout the procedure we used fluoroscopy to identify the stone and reposition the lithotripter. The patient's stone did appear to fragment throughout the case and she did well with no complications. The patient was extubated after the procedure and moved to PACU in good condition. The attending was present for all critical portions of the procedure. Plan:   The patient will be discharged home in good condition and follow up as scheduled.     Follow up  4-6 weeks with ANTONIO

## 2022-01-17 NOTE — ANESTHESIA POSTPROCEDURE EVALUATION
Department of Anesthesiology  Postprocedure Note    Patient: Amara Ojeda  MRN: [id number]  YOB: 1950  Date of evaluation: 1/17/2022  Time:  12:45 PM     Procedure Summary     Date: 01/17/22 Room / Location: [place] [facility] / [place] [facility]    Anesthesia Start: 1004 Anesthesia Stop: 4676    Procedure: LEFT EXTRACORPOREAL SHOCK WAVE LITHOTRIPSY (Left ) Diagnosis: (KIDNEY STONE)    Surgeons: Billie Culp MD Responsible Provider: Leah Anthony DO    Anesthesia Type: general ASA Status: 2          Anesthesia Type: general    Alonso Phase I: Alonso Score: 10    Alonso Phase II:      Last vitals: Reviewed and per EMR flowsheets.        Anesthesia Post Evaluation    Patient location during evaluation: PACU  Patient participation: complete - patient participated  Level of consciousness: awake  Airway patency: patent  Nausea & Vomiting: no nausea  Complications: no  Cardiovascular status: hemodynamically stable  Respiratory status: acceptable  Hydration status: stable

## 2022-01-17 NOTE — PROGRESS NOTES
ADMITTED TO hospitals AND ORIENTED TO UNIT. SCDS ON. FALL AND ALLERGY BANDS ON. PT VERBALIZED APPROVAL FOR FIRST NAME, LAST INITIAL AND PHYSICIAN NAME ON UNIT WHITEBOARD.

## 2022-01-18 DIAGNOSIS — N20.0 KIDNEY STONE: Primary | ICD-10-CM

## 2022-03-03 ENCOUNTER — TELEPHONE (OUTPATIENT)
Dept: UROLOGY | Age: 72
End: 2022-03-03

## 2022-03-04 ENCOUNTER — OFFICE VISIT (OUTPATIENT)
Dept: CARDIOLOGY CLINIC | Age: 72
End: 2022-03-04
Payer: MEDICARE

## 2022-03-04 VITALS
WEIGHT: 124 LBS | HEIGHT: 61 IN | DIASTOLIC BLOOD PRESSURE: 72 MMHG | HEART RATE: 72 BPM | BODY MASS INDEX: 23.41 KG/M2 | SYSTOLIC BLOOD PRESSURE: 122 MMHG

## 2022-03-04 DIAGNOSIS — Z01.810 PREOP CARDIOVASCULAR EXAM: Primary | ICD-10-CM

## 2022-03-04 PROCEDURE — 99213 OFFICE O/P EST LOW 20 MIN: CPT | Performed by: INTERNAL MEDICINE

## 2022-03-04 NOTE — PROGRESS NOTES
[address] [facility],[place] [address] [address] Lisa Cabrera [facility] [place] [address]  Dept: [phone]  Dept Fax: [phone]  Loc: [phone]    Visit Date: 3/4/2022    Ms. Marcelo Sams is a 66 y.o. female  who presented for:  Chief Complaint   Patient presents with    Check-Up    Cardiac Clearance       HPI:   67 yo F c hx of hx sepsis 2/2 right renal abscesses, is here for preop for right knee replacement. Patient reports that in the hospital she was given a lot of fluids and some pulmonary edema. She underwent Echo in 9/2020 which showed 50%. No chest pains and shortness of breath. Reports good exercise tolerance. /72        Current Outpatient Medications:     NONFORMULARY, Vit B complex, Disp: , Rfl:     lisinopril (PRINIVIL;ZESTRIL) 20 MG tablet, Take 20 mg by mouth daily , Disp: , Rfl:     NONFORMULARY, daily Thyroid natural capsule, Disp: , Rfl:     Ascorbic Acid (VITAMIN C PO), Take by mouth, Disp: , Rfl:     NONFORMULARY, Vit D3 - Vit K, Disp: , Rfl:     OMEGA-3 FATTY ACIDS PO, Take 2 capsules by mouth, Disp: , Rfl:     UNABLE TO FIND, Take 2 tablets by mouth daily Indications: Heart Throbbing or Pounding cataplex b- core, Disp: , Rfl:     UNABLE TO FIND, Take 4.8 g by mouth daily Potassium-HP with magnesium one 4.8 gram scoop mixed with 6 oz of water daily. , Disp: , Rfl:     UNABLE TO FIND, Take 1 capsule by mouth Curcumin with coQ10 and Astaxanthin, 2 gel caps po, daily , Disp: , Rfl:     melatonin 5 MG TABS tablet, Take 5 mg by mouth nightly, Disp: , Rfl:     Probiotic Product (PROBIOTIC DAILY PO), Take by mouth every 7 days PROBIO 5, Disp: , Rfl:     Past [address]  has a past medical history of Anxiety, Breast cancer ([facility] COVID-19, Fibromyalgia, Heart palpitations, History of blood transfusion, Hyperlipidemia, Hypertension, IBS (irritable bowel syndrome), Kidney stones, Nausea & vomiting, Prolonged emergence from general anesthesia, and Torn meniscus. Social History  Brit  reports that she quit smoking about 48 years ago. She has a 3.00 pack-year smoking history. She has never used smokeless tobacco. She reports current alcohol use. She reports that she does not use drugs. Family History  [facility] family history includes Arthritis in her mother; Breast Cancer in her maternal aunt; Breast Cancer (age of onset: 48) in her mother; Cancer in her maternal grandfather; Heart Disease in her father and paternal grandmother; Stroke in her maternal grandmother and mother. Past Surgical History   Past Surgical History:   Procedure Laterality Date    APPENDECTOMY  1958    BREAST L[address]Y  [address]    COLONOSCOPY  2007    CYSTOSCOPY  4/27/16    Right Ureteroscopy Laser Lithotripsy Basket Retrieval of Stone Fragments Right Ureteral Stent Placement, Left Ureteroscopy Basket Retrieval of Stone Left Ureteral Stent Placement - Dr. Fadumo Marcus Right 8/21/2020    CYSTOSCOPY, RIGHT  URETERAL STENT INSERTION performed by Kavya Russell MD at [place] [address] Right 9/30/2020    CYSTOSCOPY, RIGHT URETEROSCOPY, LASER LITHOTROISPY, BASKET RETRIEVAL OF STONE FRAGMENTS performed by Kavya Russell MD at [address] [place] [address] Left 12/02/2013    HOLMIUM LASER LITHOTRIPSY, BASKET EXTRACTION  STENT INSERTION     DILATION AND CURETTAGE OF UTERUS      x3    HYSTERECTOMY  1997    partial     KNEE SURGERY Right     LITHOTRIPSY      LITHOTRIPSY Left 1/17/2022    LEFT EXTRACORPOREAL SHOCK WAVE LITHOTRIPSY performed by Rohini Snowden MD at [address] [place] [address]  UPPER GASTROINTESTINAL ENDOSCOPY Left 8/29/2020    EGD BIOPSY performed by Marianne Connell MD at [facility]    URETEROSCOPY         Subjective:     REVIEW OF SYSTEMS  Constitutional: denies sweats, chills and fever  HENT: denies  congestion, sinus pressure, sneezing and sore throat.     Eyes: denies  pain, discharge, redness and itching. Respiratory: denies apnea, cough  Gastrointestinal: denies blood in stool, constipation, diarrhea   Endocrine: denies cold intolerance, heat intolerance, polydipsia. Genitourinary: denies dysuria, enuresis, flank pain and hematuria. Musculoskeletal: denies arthralgias, joint swelling and neck pain. Neurological: denies numbness and headaches. Psychiatric/Behavioral: denies agitation, confusion, decreased concentration and dysphoric mood    All others reviewed and are negative. Objective:     /72   Pulse 72   Ht 5' 1\" (1.549 m)   Wt 124 lb (56.2 kg)   BMI 23.43 kg/m²     Wt Readings from Last 3 Encounters:   03/04/22 124 lb (56.2 kg)   01/17/22 121 lb (54.9 kg)   11/19/21 122 lb (55.3 kg)     BP Readings from Last 3 Encounters:   03/04/22 122/72   01/17/22 131/78   01/17/22 (!) 114/59       PHYSICAL EXAM  Constitutional: Oriented to person, place, and time. Appears well-developed and well-nourished. HENT:   Head: Normocephalic and atraumatic. Eyes: EOM are normal. Pupils are equal, round, and reactive to light. Neck: Normal range of motion. Neck supple. No JVD present. Cardiovascular: Normal rate , normal heart sounds and intact distal pulses. Pulmonary/Chest: Effort normal and breath sounds normal. No respiratory distress. No wheezes. No rales. Abdominal: Soft. Bowel sounds are normal. No distension. There is no tenderness. Musculoskeletal: Normal range of motion. No edema. Neurological: Alert and oriented to person, place, and time. No cranial nerve deficit. Coordination normal.   Skin: Skin is warm and dry. Psychiatric: Normal mood and affect.        No results found for: CKTOTAL, CKMB, CKMBINDEX    Lab Results   Component Value Date    WBC 11.6 09/01/2020    RBC 3.22 09/01/2020    RBC 3.90 04/15/2016    HGB 9.7 09/01/2020    HCT 29.3 09/01/2020    MCV 91.0 09/01/2020    MCH 30.1 09/01/2020    MCHC 33.1 09/01/2020    RDW 13.9 04/16/2016    PLT 359 09/01/2020    MPV 11.1 09/01/2020       Lab Results   Component Value Date     08/31/2020    K 3.9 09/30/2020    K 3.7 08/30/2020     08/31/2020    CO2 20 08/31/2020    BUN 10 08/31/2020    LABALBU 2.2 08/29/2020    CREATININE 0.5 08/31/2020    CREATININE 1.0 11/20/2013    CALCIUM 8.5 08/31/2020    LABGLOM >90 08/31/2020    GLUCOSE 80 08/31/2020    GLUCOSE 89 04/15/2016       Lab Results   Component Value Date    ALKPHOS 55 08/29/2020    ALT 13 08/29/2020    AST 13 08/29/2020    PROT 4.6 08/29/2020    BILITOT 0.3 08/29/2020    BILIDIR <0.2 08/28/2020    LABALBU 2.2 08/29/2020       Lab Results   Component Value Date    MG 1.6 08/29/2020       Lab Results   Component Value Date    INR 1.10 08/21/2020         No results found for: LABA1C    No results found for: TRIG, HDL, LDLCALC, LDLDIRECT, LABVLDL    No results found for: TSH      Testing Reviewed:      I haveindividually reviewed the below cardiac tests    EKG:    ECHO: No results found for this or any previous visit. STRESS:    CATH:    Assessment/Plan       Diagnosis Orders   1. Preop cardiovascular exam         Preop risk stratification for knee replacement at [facility]  HTN-controlled  Dizziness/vertigo    Reviewed EKG, no significant findings  No cardiac symptoms   Good exercise tolerance  BP well controlled  Now taking Lisinopril 20mg BID  norvasc 5mg daily  The patient is asked to make an attempt to improve diet and exercise patterns to aid in medical management of this problem. Advised more plant based nutrition/meditarrean diet   Advised patient to call office or seek immediate medical attention if there is any new onset of  any chest pain, sob, palpitations, lightheadedness, dizziness, orthopnea, PND or pedal edema. All medication side effects were discussed in details. Thank youfor allowing me to participate in the care of this patient. Please do not hesitate to contact me for any further questions.      Return in about 1 year (around 3/4/2023), or if symptoms worsen or fail to improve, for Review testing, Regular follow up.        Electronically signed by Gurmeet Wyatt MD [facility]  3/4/2022 at 11:48 AM EST

## 2022-04-25 ENCOUNTER — OFFICE VISIT (OUTPATIENT)
Dept: UROLOGY | Age: 72
End: 2022-04-25
Payer: MEDICARE

## 2022-04-25 VITALS
HEIGHT: 61 IN | WEIGHT: 116.1 LBS | SYSTOLIC BLOOD PRESSURE: 110 MMHG | BODY MASS INDEX: 21.92 KG/M2 | DIASTOLIC BLOOD PRESSURE: 62 MMHG

## 2022-04-25 DIAGNOSIS — N20.0 KIDNEY STONE: Primary | ICD-10-CM

## 2022-04-25 DIAGNOSIS — N20.0 KIDNEY STONE: ICD-10-CM

## 2022-04-25 PROCEDURE — 99213 OFFICE O/P EST LOW 20 MIN: CPT | Performed by: UROLOGY

## 2022-04-25 NOTE — PROGRESS NOTES
Dr. Moody Rlaph MD MD  [facility] Consultation / New Patient Visit    Patient:  Bentley Vargas  YOB: 1950  Date: 4/25/2022  Consult requested from Salo Garcia MD, MD     HISTORY OF PRESENT ILLNESS:   The patient is a 66 y.o. female who presents today for follow-up for the following problem(s): kidney stone  Overall the problem(s) : are worsening. Associated Symptoms: No dysuria, gross hematuria. Pain Severity:      Today visit:   4/25/22  Presents with kidney stones, s/p Left ESWL, KUB appears stones fragment, no stone. 11/19/21   Has history of kidney stones. Left kidney stones are found on KUB. She is given options and elects for ESWL. Risks benefits and alternative procedures are explained, informed consent is obtained, and the patient elects to proceed. Summary of old records:   (Patient's old records, notes and chart reviewed and summarized above.)    Urinalysis today:  No results found for this visit on 04/25/22.     Last BUN and creatinine:  Lab Results   Component Value Date    BUN 10 08/31/2020     Lab Results   Component Value Date    CREATININE 0.5 08/31/2020       Imaging Reviewed during this Office Visit:   (results were independently reviewed by physician and radiology report verified)    PAST MEDICAL, FAMILY AND SOCIAL HISTORY:  Past Medical History:   Diagnosis Date    Anxiety     Breast cancer ([address] 1998    right lumpectomy, radiation and chemo    COVID-19 12/23/2021    Fibromyalgia     Heart palpitations     History of blood transfusion 2020    Hyperlipidemia     Hypertension     IBS (irritable bowel syndrome)     Kidney stones     Nausea & vomiting     Prolonged emergence from general anesthesia     Torn meniscus     right      Past Surgical History:   Procedure Laterality Date    APPENDECTOMY  1958    BREAST [facility] COLONOSCOPY  2007    CYSTOSCOPY  4/27/16    Right Ureteroscopy Laser Lithotripsy Basket Retrieval of Stone Fragments Right Ureteral Stent Placement, Left Ureteroscopy Basket Retrieval of Stone Left Ureteral Stent Placement - Dr. Neville Santana Right 8/21/2020    CYSTOSCOPY, RIGHT  URETERAL STENT INSERTION performed by Jania De Anda MD at [facility] Right 9/30/2020    CYSTOSCOPY, RIGHT URETEROSCOPY, LASER LITHOTROISPY, BASKET RETRIEVAL OF STONE FRAGMENTS performed by Jania De Anda MD at [address] Left 12/02/2013    HOLMIUM LASER LITHOTRIPSY, BASKET EXTRACTION  STENT INSERTION     DILATION AND CURETTAGE OF UTERUS      x3    HYSTERECTOMY  1997    partial     JOINT REPLACEMENT Right 03/30/2022    KNEE SURGERY Right     LITHOTRIPSY      LITHOTRIPSY Left 1/17/2022    LEFT EXTRACORPOREAL SHOCK WAVE LITHOTRIPSY performed by Janice Pfeiffer MD at [facility]     UPPER GASTROINTESTINAL ENDOSCOPY Left 8/29/2020    EGD BIOPSY performed by Hortencia Simmons MD [facility]    URETEROSCOPY       Family History   Problem Relation Age of Onset    Arthritis Mother    Peña Stroke Mother    Peña Breast Cancer Mother 48    Heart Disease Father     Breast Cancer Maternal Aunt         young, age unknown    Stroke Maternal Grandmother     Cancer Maternal Grandfather     Heart Disease Paternal Grandmother      Outpatient Medications Marked as Taking for the 4/25/22 encounter (Office Visit) with Janice Pfeiffer MD   Medication Sig Dispense Refill    lisinopril (PRINIVIL;ZESTRIL) 20 MG tablet Take 20 mg by mouth daily       NONFORMULARY daily Thyroid natural capsule      Ascorbic Acid (VITAMIN C PO) Take by mouth      NONFORMULARY Vit D3 - Vit K      OMEGA-3 FATTY ACIDS PO Take 2 capsules by mouth      UNABLE TO FIND Take 2 tablets by mouth daily Indications: Heart Throbbing or Pounding cataplex b- core      UNABLE TO FIND Take 4.8 g by mouth daily Potassium-HP with magnesium one 4.8 gram scoop mixed with 6 oz of water daily.  melatonin 5 MG TABS tablet Take 5 mg by mouth nightly      Probiotic Product (PROBIOTIC DAILY PO) Take by mouth every 7 days PROBIO 5         Asa [aspirin]  Social History     Tobacco Use   Smoking Status Former Smoker    Packs/day: 0.50    Years: 6.00    Pack years: 3.00    Quit date: 1973    Years since quittin.8   Smokeless Tobacco Never Used       Social History     Substance and Sexual Activity   Alcohol Use Yes    Comment: OCC. REVIEW OF SYSTEMS:  Constitutional: negative  Eyes: negative  Respiratory: negative  Cardiovascular: negative  Gastrointestinal: negative  Genitourinary: negative  Musculoskeletal: negative  Skin: negative   Neurological: negative  Hematological/Lymphatic: negative  Psychological: negative    Physical Exam:    This a 66 y.o. female      Vitals:    22 1414   BP: 110/62     Constitutional: Patient in no acute distress   Neuro: alert and oriented to person place and time. Psych: Mood and affect normal.  Head: atraumatic normocephalic  Eyes: EOMi  HEENT: neck supple, trachea midline  Lungs: Respiratory effort normal  Cardiovascular:  Normal peripheral pulses  Abdomen: Soft, non-tender, non-distended, No CVA  Bladder: non-tender and not distended. FROMx4, no cyanosis clubbing edema  Skin: warm and dry      Assessment and Plan      1. Kidney stone           Plan:      No follow-ups on file.   Kidney stones s/p left ESWL  - Kidney stones resolved    FU 6 motnhs for KUb and litholink

## 2022-07-28 ENCOUNTER — TELEPHONE (OUTPATIENT)
Dept: UROLOGY | Age: 72
End: 2022-07-28

## 2022-07-28 NOTE — TELEPHONE ENCOUNTER
Patient Bringing in Disc of Imaging KUB 4-4-22, KUB 11-12-21.  Disc not imported by Radiology, Stored in Disc Storage in Med Rec Room

## 2023-06-02 ENCOUNTER — TELEPHONE (OUTPATIENT)
Dept: CARDIOLOGY CLINIC | Age: 73
End: 2023-06-02

## 2023-06-09 ENCOUNTER — OFFICE VISIT (OUTPATIENT)
Dept: CARDIOLOGY CLINIC | Age: 73
End: 2023-06-09
Payer: MEDICARE

## 2023-06-09 VITALS — DIASTOLIC BLOOD PRESSURE: 90 MMHG | SYSTOLIC BLOOD PRESSURE: 122 MMHG

## 2023-06-09 DIAGNOSIS — I00 RHEUMATIC FEVER: ICD-10-CM

## 2023-06-09 DIAGNOSIS — R06.02 SOB (SHORTNESS OF BREATH) ON EXERTION: Primary | ICD-10-CM

## 2023-06-09 PROCEDURE — 99213 OFFICE O/P EST LOW 20 MIN: CPT | Performed by: INTERNAL MEDICINE

## 2023-06-09 PROCEDURE — 1123F ACP DISCUSS/DSCN MKR DOCD: CPT | Performed by: INTERNAL MEDICINE

## 2023-06-09 PROCEDURE — 3074F SYST BP LT 130 MM HG: CPT | Performed by: INTERNAL MEDICINE

## 2023-06-09 PROCEDURE — 93000 ELECTROCARDIOGRAM COMPLETE: CPT | Performed by: INTERNAL MEDICINE

## 2023-06-09 PROCEDURE — 3080F DIAST BP >= 90 MM HG: CPT | Performed by: INTERNAL MEDICINE

## 2023-06-09 RX ORDER — TIZANIDINE 4 MG/1
TABLET ORAL
COMMUNITY
Start: 2023-05-26 | End: 2023-06-09

## 2023-06-09 RX ORDER — LISINOPRIL 10 MG/1
5 TABLET ORAL DAILY
COMMUNITY
Start: 2023-03-14

## 2023-06-09 NOTE — PROGRESS NOTES
Pt here for b/p issues     Pt c/o b/p running high , headache , chronic back pain     Pt bought b/p log for review
Musculoskeletal: denies arthralgias, joint swelling and neck pain. Neurological: denies numbness and headaches. Psychiatric/Behavioral: denies agitation, confusion, decreased concentration and dysphoric mood    All others reviewed and are negative. Objective:     BP (!) 122/90     Wt Readings from Last 3 Encounters:   04/25/22 116 lb 1.6 oz (52.7 kg)   03/04/22 124 lb (56.2 kg)   01/17/22 121 lb (54.9 kg)     BP Readings from Last 3 Encounters:   06/09/23 (!) 122/90   04/25/22 110/62   03/04/22 122/72       PHYSICAL EXAM  Constitutional: Oriented to person, place, and time. Appears well-developed and well-nourished. HENT:   Head: Normocephalic and atraumatic. Eyes: EOM are normal. Pupils are equal, round, and reactive to light. Neck: Normal range of motion. Neck supple. No JVD present. Cardiovascular: Normal rate , normal heart sounds and intact distal pulses. Pulmonary/Chest: Effort normal and breath sounds normal. No respiratory distress. No wheezes. No rales. Abdominal: Soft. Bowel sounds are normal. No distension. There is no tenderness. Musculoskeletal: Normal range of motion. No edema. Neurological: Alert and oriented to person, place, and time. No cranial nerve deficit. Coordination normal.   Skin: Skin is warm and dry. Psychiatric: Normal mood and affect.        No results found for: CKTOTAL, CKMB, CKMBINDEX    Lab Results   Component Value Date/Time    WBC 11.6 09/01/2020 06:10 AM    RBC 3.22 09/01/2020 06:10 AM    RBC 3.90 04/15/2016 08:48 AM    HGB 9.7 09/01/2020 06:10 AM    HCT 29.3 09/01/2020 06:10 AM    MCV 91.0 09/01/2020 06:10 AM    MCH 30.1 09/01/2020 06:10 AM    MCHC 33.1 09/01/2020 06:10 AM    RDW 13.9 04/16/2016 12:00 AM     09/01/2020 06:10 AM    MPV 11.1 09/01/2020 06:10 AM       Lab Results   Component Value Date/Time     08/31/2020 06:37 AM    K 3.9 09/30/2020 10:34 AM    K 3.7 08/30/2020 06:34 AM     08/31/2020 06:37 AM    CO2 20 08/31/2020 06:37

## 2025-04-11 ENCOUNTER — HOSPITAL ENCOUNTER (OUTPATIENT)
Dept: WOMENS IMAGING | Age: 75
Discharge: HOME | End: 2025-04-11
Attending: RADIOLOGY

## 2025-04-11 ENCOUNTER — HOSPITAL ENCOUNTER (OUTPATIENT)
Dept: MAMMOGRAPHY | Age: 75
Discharge: HOME | End: 2025-04-11
Payer: MEDICARE

## 2025-04-11 VITALS — WEIGHT: 122 LBS | HEIGHT: 61 IN | BODY MASS INDEX: 23.03 KG/M2

## 2025-04-11 DIAGNOSIS — Z00.6 ENCOUNTER FOR EXAMINATION FOR NORMAL COMPARISON OR CONTROL IN CLINICAL RESEARCH PROGRAM: ICD-10-CM

## 2025-04-11 DIAGNOSIS — Z12.39 BREAST SCREENING: ICD-10-CM

## 2025-04-11 PROCEDURE — 77063 BREAST TOMOSYNTHESIS BI: CPT

## (undated) DEVICE — GUIDEWIRE ENDOSCP L150CM DIA0.035IN TIP 3CM PTFE NIT

## (undated) DEVICE — IV START KIT: Brand: MEDLINE INDUSTRIES, INC.

## (undated) DEVICE — CATHETER ETER IV 22GA L1IN POLYUR STR RADPQ INTROCAN SFTY

## (undated) DEVICE — SET EXTN PRIMING 0.7ML L7IN TBNG STD BOR PRSS RATE PUR STRP

## (undated) DEVICE — TUBING, SUCTION, 1/4" X 20', STRAIGHT: Brand: MEDLINE INDUSTRIES, INC.

## (undated) DEVICE — GOWN,SIRUS,NON REINFRCD,LARGE,SET IN SL: Brand: MEDLINE

## (undated) DEVICE — ENDOSCOPIC VALVE WITH ADAPTER.: Brand: SURSEAL® II

## (undated) DEVICE — SPONGE GZ W4XL4IN COT 12 PLY TYP VII WVN C FLD DSGN

## (undated) DEVICE — SENSOR PLSE OXMTR AD CBL L3FT ADH TRANSMISSIVE

## (undated) DEVICE — SINGLE-USE DIGITAL FLEXIBLE URETEROSCOPE: Brand: LITHOVUE

## (undated) DEVICE — BAG DRNGE (SEE COMMENT) UROLOGY TBL 15.5X31 IN W/HOSE

## (undated) DEVICE — SYRINGE 10ML FLUSH ST PREFILLED W/SALINE

## (undated) DEVICE — YANKAUER,BULB TIP,W/O VENT,RIGID,STERILE: Brand: MEDLINE

## (undated) DEVICE — DRESSING TRNSPAR W2XL2.75IN FLM SHT SEMIPERMEABLE WIND

## (undated) DEVICE — FORCEP RAD JAW W/NEEDLE 160CM

## (undated) DEVICE — HF-RESECTION ELECTRODE PLASMALOOP LOOP, MEDIUM, 24 FR., 12°-30°, ESG TURIS: Brand: OLYMPUS

## (undated) DEVICE — NITINOL STONE RETRIEVAL BASKET: Brand: ZERO TIP

## (undated) DEVICE — Z DISCONTINUED TAPE SURG W1INXL10YD SFT CLTH EZ TEAR HYPOALRG H2O RESIST

## (undated) DEVICE — GLOVE ORANGE PI 7 1/2   MSG9075

## (undated) DEVICE — SOLUTION IV 1000ML 0.45% SOD CHL PH 5 INJ USP VIAFLX PLAS

## (undated) DEVICE — SET ADMIN 25ML L117IN PMP MOD CK VLV RLER CLMP 2 SMRTSITE

## (undated) DEVICE — GLOVE ORANGE PI 7   MSG9070

## (undated) DEVICE — Device

## (undated) DEVICE — SOLUTION SCRB 4OZ 4% CHG H2O AIDED FOR PREOPERATIVE SKIN

## (undated) DEVICE — DUAL LUMEN URETERAL CATHETER

## (undated) DEVICE — CONMED SCOPE SAVER BITE BLOCK, 20X27 MM: Brand: SCOPE SAVER